# Patient Record
Sex: FEMALE | Race: BLACK OR AFRICAN AMERICAN | NOT HISPANIC OR LATINO | Employment: UNEMPLOYED | URBAN - METROPOLITAN AREA
[De-identification: names, ages, dates, MRNs, and addresses within clinical notes are randomized per-mention and may not be internally consistent; named-entity substitution may affect disease eponyms.]

---

## 2017-05-10 ENCOUNTER — HISTORICAL (OUTPATIENT)
Dept: LAB | Facility: HOSPITAL | Age: 57
End: 2017-05-10

## 2017-05-10 LAB
HAV IGM SERPL QL IA: NEGATIVE
HBV CORE IGM SERPL QL IA: NEGATIVE
HBV SURFACE AG SERPL QL IA: NEGATIVE
HCV AB SERPL QL IA: NEGATIVE
HEPATITIS PANEL INTERP: NORMAL

## 2017-05-23 ENCOUNTER — HISTORICAL (OUTPATIENT)
Dept: RADIOLOGY | Facility: HOSPITAL | Age: 57
End: 2017-05-23

## 2017-08-10 ENCOUNTER — HISTORICAL (OUTPATIENT)
Dept: RADIOLOGY | Facility: HOSPITAL | Age: 57
End: 2017-08-10

## 2017-09-14 ENCOUNTER — HISTORICAL (OUTPATIENT)
Dept: ADMINISTRATIVE | Facility: HOSPITAL | Age: 57
End: 2017-09-14

## 2017-09-14 LAB — HEMOCCULT SP1 STL QL: POSITIVE

## 2017-11-16 ENCOUNTER — HISTORICAL (OUTPATIENT)
Dept: LAB | Facility: HOSPITAL | Age: 57
End: 2017-11-16

## 2017-11-16 LAB
ABS NEUT (OLG): 5.5 X10(3)/MCL (ref 2.1–9.2)
ALBUMIN SERPL-MCNC: 3.3 GM/DL (ref 3.4–5)
ALBUMIN/GLOB SERPL: 0.8 {RATIO}
ALP SERPL-CCNC: 80 UNIT/L (ref 38–126)
ALT SERPL-CCNC: 37 UNIT/L (ref 12–78)
AST SERPL-CCNC: 24 UNIT/L (ref 15–37)
BASOPHILS # BLD AUTO: 0 X10(3)/MCL (ref 0–0.2)
BASOPHILS NFR BLD AUTO: 1 %
BILIRUB SERPL-MCNC: 0.3 MG/DL (ref 0.2–1)
BILIRUBIN DIRECT+TOT PNL SERPL-MCNC: 0.1 MG/DL (ref 0–0.2)
BILIRUBIN DIRECT+TOT PNL SERPL-MCNC: 0.2 MG/DL (ref 0–0.8)
BUN SERPL-MCNC: 14 MG/DL (ref 7–18)
CALCIUM SERPL-MCNC: 9.1 MG/DL (ref 8.5–10.1)
CHLORIDE SERPL-SCNC: 107 MMOL/L (ref 98–107)
CHOLEST SERPL-MCNC: 208 MG/DL (ref 0–200)
CHOLEST/HDLC SERPL: 3.9 {RATIO} (ref 0–4)
CO2 SERPL-SCNC: 27 MMOL/L (ref 21–32)
CREAT SERPL-MCNC: 0.88 MG/DL (ref 0.55–1.02)
DEPRECATED CALCIDIOL+CALCIFEROL SERPL-MC: 27.98 NG/ML (ref 30–80)
EOSINOPHIL # BLD AUTO: 0.4 X10(3)/MCL (ref 0–0.9)
EOSINOPHIL NFR BLD AUTO: 4 %
ERYTHROCYTE [DISTWIDTH] IN BLOOD BY AUTOMATED COUNT: 13 % (ref 11.5–17)
GLOBULIN SER-MCNC: 4 GM/DL (ref 2.4–3.5)
GLUCOSE SERPL-MCNC: 88 MG/DL (ref 74–106)
HCT VFR BLD AUTO: 40.9 % (ref 37–47)
HDLC SERPL-MCNC: 53 MG/DL (ref 35–60)
HGB BLD-MCNC: 12.7 GM/DL (ref 12–16)
LDLC SERPL CALC-MCNC: 136 MG/DL (ref 0–129)
LYMPHOCYTES # BLD AUTO: 1.8 X10(3)/MCL (ref 0.6–4.6)
LYMPHOCYTES NFR BLD AUTO: 21 %
MCH RBC QN AUTO: 29.3 PG (ref 27–31)
MCHC RBC AUTO-ENTMCNC: 31.1 GM/DL (ref 33–36)
MCV RBC AUTO: 94.5 FL (ref 80–94)
MONOCYTES # BLD AUTO: 0.7 X10(3)/MCL (ref 0.1–1.3)
MONOCYTES NFR BLD AUTO: 8 %
NEUTROPHILS # BLD AUTO: 5.5 X10(3)/MCL (ref 1.4–7.9)
NEUTROPHILS NFR BLD AUTO: 65 %
PLATELET # BLD AUTO: 283 X10(3)/MCL (ref 130–400)
PMV BLD AUTO: 10.5 FL (ref 9.4–12.4)
POTASSIUM SERPL-SCNC: 4.1 MMOL/L (ref 3.5–5.1)
PROT SERPL-MCNC: 7.3 GM/DL (ref 6.4–8.2)
RBC # BLD AUTO: 4.33 X10(6)/MCL (ref 4.2–5.4)
SODIUM SERPL-SCNC: 140 MMOL/L (ref 136–145)
T4 FREE SERPL-MCNC: 0.94 NG/DL (ref 0.76–1.46)
TRIGL SERPL-MCNC: 96 MG/DL (ref 30–150)
TSH SERPL-ACNC: 2.57 MIU/ML (ref 0.36–3.74)
VLDLC SERPL CALC-MCNC: 19 MG/DL
WBC # SPEC AUTO: 8.4 X10(3)/MCL (ref 4.5–11.5)

## 2018-11-13 ENCOUNTER — HISTORICAL (OUTPATIENT)
Dept: LAB | Facility: HOSPITAL | Age: 58
End: 2018-11-13

## 2019-02-01 LAB — CRC RECOMMENDATION EXT: NORMAL

## 2019-05-16 ENCOUNTER — HISTORICAL (OUTPATIENT)
Dept: LAB | Facility: HOSPITAL | Age: 59
End: 2019-05-16

## 2019-05-16 LAB
EST. AVERAGE GLUCOSE BLD GHB EST-MCNC: 103 MG/DL
HBA1C MFR BLD: 5.2 % (ref 4.2–6.3)

## 2019-07-29 ENCOUNTER — HISTORICAL (OUTPATIENT)
Dept: CARDIOLOGY | Facility: HOSPITAL | Age: 59
End: 2019-07-29

## 2020-09-06 ENCOUNTER — NURSE TRIAGE (OUTPATIENT)
Dept: ADMINISTRATIVE | Facility: CLINIC | Age: 60
End: 2020-09-06

## 2020-09-06 NOTE — TELEPHONE ENCOUNTER
Pt states she got a text message stating she has an appointment on Wednesday. Calling to confirm appt. No appointment visible. Advised to call MD when office is open to confirm appt.     Reason for Disposition   General information question, no triage required and triager able to answer question    Additional Information   Negative: RN needs further essential information from caller in order to complete triage   Negative: Requesting regular office appointment   Negative: [1] Caller requesting NON-URGENT health information AND [2] PCP's office is the best resource   Negative: Health Information question, no triage required and triager able to answer question    Protocols used: INFORMATION ONLY CALL - NO TRIAGE-A-

## 2020-12-14 ENCOUNTER — HISTORICAL (OUTPATIENT)
Dept: RADIOLOGY | Facility: HOSPITAL | Age: 60
End: 2020-12-14

## 2021-07-01 ENCOUNTER — HISTORICAL (OUTPATIENT)
Dept: PREADMISSION TESTING | Facility: HOSPITAL | Age: 61
End: 2021-07-01

## 2022-03-21 ENCOUNTER — HISTORICAL (OUTPATIENT)
Dept: LAB | Facility: HOSPITAL | Age: 62
End: 2022-03-21

## 2022-03-24 ENCOUNTER — HISTORICAL (OUTPATIENT)
Dept: LAB | Facility: HOSPITAL | Age: 62
End: 2022-03-24

## 2022-03-24 LAB
ABS NEUT (OLG): 4.36 (ref 2.1–9.2)
ALBUMIN SERPL-MCNC: 3.6 G/DL (ref 3.4–4.8)
ALBUMIN/GLOB SERPL: 0.9 {RATIO} (ref 1.1–2)
ALP SERPL-CCNC: 69 U/L (ref 40–150)
ALT SERPL-CCNC: 40 U/L (ref 0–55)
AST SERPL-CCNC: 31 U/L (ref 5–34)
BASOPHILS # BLD AUTO: 0.03 10*3/UL (ref 0–0.2)
BASOPHILS NFR BLD AUTO: 0.4 % (ref 0–1)
BILIRUB SERPL-MCNC: 0.6 MG/DL (ref 0.2–1.2)
BILIRUBIN DIRECT+TOT PNL SERPL-MCNC: 0.3 (ref 0–0.5)
BILIRUBIN DIRECT+TOT PNL SERPL-MCNC: 0.3 (ref 0–0.8)
BUN SERPL-MCNC: 9.5 MG/DL (ref 9.8–20.1)
CALCIUM SERPL-MCNC: 9.1 MG/DL (ref 8.4–10.2)
CHLORIDE SERPL-SCNC: 109 MMOL/L (ref 98–107)
CO2 SERPL-SCNC: 25 MMOL/L (ref 23–31)
CREAT SERPL-MCNC: 0.97 MG/DL (ref 0.57–1.11)
EOSINOPHIL # BLD AUTO: 0.27 10*3/UL (ref 0–0.9)
EOSINOPHIL NFR BLD AUTO: 4 % (ref 0–6.4)
ERYTHROCYTE [DISTWIDTH] IN BLOOD BY AUTOMATED COUNT: 13 % (ref 11.5–17)
EST. AVERAGE GLUCOSE BLD GHB EST-MCNC: 111.2 MG/DL
GLOBULIN SER-MCNC: 4.2 G/DL (ref 2.4–3.5)
GLUCOSE SERPL-MCNC: 95 MG/DL (ref 82–115)
HBA1C MFR BLD: 5.5 %
HCT VFR BLD AUTO: 46 % (ref 37–47)
HEMOLYSIS INTERF INDEX SERPL-ACNC: 15
HGB BLD-MCNC: 14.2 G/DL (ref 12–16)
ICTERIC INTERF INDEX SERPL-ACNC: 1
IMM GRANULOCYTES # BLD AUTO: 0.01 10*3/UL (ref 0–0.02)
IMM GRANULOCYTES NFR BLD AUTO: 0.1 % (ref 0–0.43)
LIPEMIC INTERF INDEX SERPL-ACNC: 3
LYMPHOCYTES # BLD AUTO: 1.53 10*3/UL (ref 0.6–4.6)
LYMPHOCYTES NFR BLD AUTO: 22.6 % (ref 16–44)
MANUAL DIFF? (OHS): NO
MCH RBC QN AUTO: 28.9 PG (ref 27–31)
MCHC RBC AUTO-ENTMCNC: 30.9 G/DL (ref 33–36)
MCV RBC AUTO: 93.7 FL (ref 80–94)
MONOCYTES # BLD AUTO: 0.56 10*3/UL (ref 0.1–1.3)
MONOCYTES NFR BLD AUTO: 8.3 % (ref 4–12.1)
NEUTROPHILS # BLD AUTO: 4.36 10*3/UL (ref 2.1–9.2)
NEUTROPHILS NFR BLD AUTO: 64.6 % (ref 43–73)
NRBC BLD AUTO-RTO: 0 % (ref 0–0.2)
PLATELET # BLD AUTO: 142 10*3/UL (ref 130–400)
PMV BLD AUTO: 11 FL (ref 7.4–10.4)
POTASSIUM SERPL-SCNC: 4 MMOL/L (ref 3.5–5.1)
PROT SERPL-MCNC: 7.8 G/DL (ref 5.8–7.6)
RBC # BLD AUTO: 4.91 10*6/UL (ref 4.2–5.4)
SODIUM SERPL-SCNC: 142 MMOL/L (ref 136–145)
T3FREE SERPL-MCNC: 2.43 PG/ML (ref 1.58–3.91)
T4 FREE SERPL-MCNC: 0.8 NG/DL (ref 0.7–1.48)
TSH SERPL-ACNC: 2.72 M[IU]/L (ref 0.35–4.94)
WBC # SPEC AUTO: 6.8 10*3/UL (ref 4.5–11.5)

## 2022-04-11 ENCOUNTER — HISTORICAL (OUTPATIENT)
Dept: ADMINISTRATIVE | Facility: HOSPITAL | Age: 62
End: 2022-04-11
Payer: MEDICARE

## 2022-04-27 VITALS
BODY MASS INDEX: 43.96 KG/M2 | WEIGHT: 273.56 LBS | SYSTOLIC BLOOD PRESSURE: 161 MMHG | HEIGHT: 66 IN | OXYGEN SATURATION: 96 % | DIASTOLIC BLOOD PRESSURE: 87 MMHG

## 2022-05-03 ENCOUNTER — TELEPHONE (OUTPATIENT)
Dept: NEUROLOGY | Facility: CLINIC | Age: 62
End: 2022-05-03
Payer: MEDICARE

## 2022-05-03 RX ORDER — LEVETIRACETAM 500 MG/1
500 TABLET ORAL 2 TIMES DAILY
Qty: 60 TABLET | Refills: 11 | Status: SHIPPED | OUTPATIENT
Start: 2022-05-03 | End: 2022-07-05

## 2022-05-03 NOTE — TELEPHONE ENCOUNTER
Patient's  (Dave) called reporting since medication change on Vimpat to 150 mg in qAm and 200 mg qPM on 04/05/22 per last office note. States patient has been nausea, has no appetite, diarrhea, and has stomach cramping. Also, states patient had a seizure this morning, reports he witness the seizure and it lasted for 15 minutes. Denies any aspirating or biting of the tongue. Denies any falls or injuries. Denies any loss of bladder or bowel movement. Denies any loss of consciousness. States patient hasn't been sleeping well and might get 4-5 hours of sleep at night. Patient does wake out of sleep throughout night. Denies any UTI symptoms. Requesting a call back to further discuss medication and what do next due to medication change is making patient sick. Please advise.

## 2022-05-03 NOTE — TELEPHONE ENCOUNTER
Pt states would be okay with adjusting medication and weaning off vimpat would like [facility] [place] [facility]

## 2022-05-04 ENCOUNTER — TELEPHONE (OUTPATIENT)
Dept: NEUROLOGY | Facility: CLINIC | Age: 62
End: 2022-05-04
Payer: MEDICARE

## 2022-05-25 RX ORDER — LACOSAMIDE 150 MG/1
150 TABLET ORAL 2 TIMES DAILY
COMMUNITY
Start: 2021-11-01 | End: 2022-05-25 | Stop reason: SDUPTHER

## 2022-05-25 RX ORDER — LACOSAMIDE 150 MG/1
150 TABLET ORAL 2 TIMES DAILY
Qty: 60 TABLET | Refills: 3 | Status: SHIPPED | OUTPATIENT
Start: 2022-05-25 | End: 2022-06-08 | Stop reason: SDUPTHER

## 2022-06-08 DIAGNOSIS — G40.209 PARTIAL SYMPTOMATIC EPILEPSY WITH COMPLEX PARTIAL SEIZURES, NOT INTRACTABLE, WITHOUT STATUS EPILEPTICUS: Primary | ICD-10-CM

## 2022-06-08 RX ORDER — CLONAZEPAM 1 MG/1
0.5 TABLET ORAL NIGHTLY
COMMUNITY
Start: 2021-08-13 | End: 2022-06-08 | Stop reason: SDUPTHER

## 2022-06-09 RX ORDER — LACOSAMIDE 150 MG/1
150 TABLET ORAL 2 TIMES DAILY
Qty: 60 TABLET | Refills: 3 | Status: SHIPPED | OUTPATIENT
Start: 2022-06-09 | End: 2022-09-22 | Stop reason: SDUPTHER

## 2022-06-09 RX ORDER — CLONAZEPAM 1 MG/1
0.5 TABLET ORAL NIGHTLY
Qty: 15 TABLET | Refills: 3 | Status: SHIPPED | OUTPATIENT
Start: 2022-06-09 | End: 2022-10-07 | Stop reason: SDUPTHER

## 2022-06-29 ENCOUNTER — TELEPHONE (OUTPATIENT)
Dept: FAMILY MEDICINE | Facility: CLINIC | Age: 62
End: 2022-06-29
Payer: MEDICARE

## 2022-06-29 DIAGNOSIS — J44.9 CHRONIC OBSTRUCTIVE PULMONARY DISEASE, UNSPECIFIED COPD TYPE: Primary | ICD-10-CM

## 2022-06-29 NOTE — TELEPHONE ENCOUNTER
----- Message from Mani Clinton sent at 6/29/2022  2:50 PM CDT -----  .Type:  Needs Medical Advice    Who Called: Won [facility]  Symptoms (please be specific):    How long has patient had these symptoms:    Pharmacy name and phone #:    Would the patient rather a call back or a response via [facility]?   Best Call Back Number:  [phone]  Additional Information: She needs a referral to a pulmonologist,  she would like to see Dr. Won Gallego, # [phone]

## 2022-06-29 NOTE — TELEPHONE ENCOUNTER
I have signed for the following orders.  Please call the patient to inform about referrals that were sent.      Orders Placed This Encounter   Procedures    Ambulatory referral/consult to Pulmonology     Standing Status:   Future     Standing Expiration Date:   7/29/2023     Referral Priority:   Routine     Referral Type:   Consultation     Referral Reason:   Specialty Services Required     Referred to Provider:   Won Gallego MD     Requested Specialty:   Pulmonary Disease     Number of Visits Requested:   1

## 2022-07-05 ENCOUNTER — OFFICE VISIT (OUTPATIENT)
Dept: FAMILY MEDICINE | Facility: CLINIC | Age: 62
End: 2022-07-05
Payer: MEDICARE

## 2022-07-05 ENCOUNTER — LAB VISIT (OUTPATIENT)
Dept: LAB | Facility: HOSPITAL | Age: 62
End: 2022-07-05
Attending: FAMILY MEDICINE
Payer: MEDICARE

## 2022-07-05 VITALS
SYSTOLIC BLOOD PRESSURE: 138 MMHG | DIASTOLIC BLOOD PRESSURE: 82 MMHG | TEMPERATURE: 98 F | RESPIRATION RATE: 18 BRPM | BODY MASS INDEX: 45.54 KG/M2 | HEART RATE: 87 BPM | OXYGEN SATURATION: 99 % | WEIGHT: 280 LBS

## 2022-07-05 DIAGNOSIS — F41.9 ANXIETY: ICD-10-CM

## 2022-07-05 DIAGNOSIS — F32.A DEPRESSIVE DISORDER: ICD-10-CM

## 2022-07-05 DIAGNOSIS — G47.33 OBSTRUCTIVE SLEEP APNEA SYNDROME: ICD-10-CM

## 2022-07-05 DIAGNOSIS — E66.01 CLASS 3 SEVERE OBESITY WITH SERIOUS COMORBIDITY AND BODY MASS INDEX (BMI) OF 45.0 TO 49.9 IN ADULT, UNSPECIFIED OBESITY TYPE: ICD-10-CM

## 2022-07-05 DIAGNOSIS — G40.909 NONINTRACTABLE EPILEPSY WITHOUT STATUS EPILEPTICUS, UNSPECIFIED EPILEPSY TYPE: Primary | ICD-10-CM

## 2022-07-05 DIAGNOSIS — G62.9 NEUROPATHY: ICD-10-CM

## 2022-07-05 DIAGNOSIS — J44.9 CHRONIC OBSTRUCTIVE PULMONARY DISEASE, UNSPECIFIED COPD TYPE: ICD-10-CM

## 2022-07-05 DIAGNOSIS — G40.909 NONINTRACTABLE EPILEPSY WITHOUT STATUS EPILEPTICUS, UNSPECIFIED EPILEPSY TYPE: ICD-10-CM

## 2022-07-05 PROBLEM — K21.9 GASTROESOPHAGEAL REFLUX DISEASE: Status: ACTIVE | Noted: 2022-07-05

## 2022-07-05 PROBLEM — J43.9 PULMONARY EMPHYSEMA: Status: ACTIVE | Noted: 2022-07-05

## 2022-07-05 PROBLEM — I10 HYPERTENSION: Status: ACTIVE | Noted: 2022-07-05

## 2022-07-05 PROBLEM — Z12.31 BREAST CANCER SCREENING BY MAMMOGRAM: Status: ACTIVE | Noted: 2022-07-05

## 2022-07-05 PROBLEM — N32.81 OVERACTIVE BLADDER: Status: ACTIVE | Noted: 2022-07-05

## 2022-07-05 PROBLEM — C18.9 COLON CANCER: Status: ACTIVE | Noted: 2018-01-18

## 2022-07-05 PROBLEM — E66.813 CLASS 3 SEVERE OBESITY WITH SERIOUS COMORBIDITY AND BODY MASS INDEX (BMI) OF 45.0 TO 49.9 IN ADULT: Status: ACTIVE | Noted: 2022-07-05

## 2022-07-05 PROBLEM — H40.9 GLAUCOMA: Status: ACTIVE | Noted: 2022-07-05

## 2022-07-05 PROBLEM — C18.5: Status: ACTIVE | Noted: 2017-12-20

## 2022-07-05 PROBLEM — Z00.00 MEDICARE ANNUAL WELLNESS VISIT, SUBSEQUENT: Status: ACTIVE | Noted: 2022-07-05

## 2022-07-05 PROBLEM — Z83.3 FAMILY HISTORY OF DIABETES MELLITUS: Status: ACTIVE | Noted: 2022-07-05

## 2022-07-05 PROBLEM — J43.9 PULMONARY EMPHYSEMA: Status: RESOLVED | Noted: 2022-07-05 | Resolved: 2022-07-05

## 2022-07-05 PROBLEM — D24.9 FIBROADENOMA OF BREAST: Status: ACTIVE | Noted: 2022-07-05

## 2022-07-05 LAB
APPEARANCE UR: ABNORMAL
BILIRUB UR QL STRIP.AUTO: NEGATIVE MG/DL
COLOR UR AUTO: YELLOW
GLUCOSE UR QL STRIP.AUTO: NEGATIVE MG/DL
KETONES UR QL STRIP.AUTO: NEGATIVE MG/DL
LEUKOCYTE ESTERASE UR QL STRIP.AUTO: NEGATIVE UNIT/L
NITRITE UR QL STRIP.AUTO: NEGATIVE
PH UR STRIP.AUTO: 7.5 [PH]
PROT UR QL STRIP.AUTO: NEGATIVE MG/DL
RBC UR QL AUTO: NEGATIVE UNIT/L
SP GR UR STRIP.AUTO: 1.02
UROBILINOGEN UR STRIP-ACNC: 1 MG/DL

## 2022-07-05 PROCEDURE — 99214 PR OFFICE/OUTPT VISIT, EST, LEVL IV, 30-39 MIN: ICD-10-PCS | Mod: ,,, | Performed by: FAMILY MEDICINE

## 2022-07-05 PROCEDURE — 99214 OFFICE O/P EST MOD 30 MIN: CPT | Mod: ,,, | Performed by: FAMILY MEDICINE

## 2022-07-05 PROCEDURE — 3008F PR BODY MASS INDEX (BMI) DOCUMENTED: ICD-10-PCS | Mod: CPTII,,, | Performed by: FAMILY MEDICINE

## 2022-07-05 PROCEDURE — 1159F PR MEDICATION LIST DOCUMENTED IN MEDICAL RECORD: ICD-10-PCS | Mod: CPTII,,, | Performed by: FAMILY MEDICINE

## 2022-07-05 PROCEDURE — 1159F MED LIST DOCD IN RCRD: CPT | Mod: CPTII,,, | Performed by: FAMILY MEDICINE

## 2022-07-05 PROCEDURE — 3079F DIAST BP 80-89 MM HG: CPT | Mod: CPTII,,, | Performed by: FAMILY MEDICINE

## 2022-07-05 PROCEDURE — 3075F SYST BP GE 130 - 139MM HG: CPT | Mod: CPTII,,, | Performed by: FAMILY MEDICINE

## 2022-07-05 PROCEDURE — 3008F BODY MASS INDEX DOCD: CPT | Mod: CPTII,,, | Performed by: FAMILY MEDICINE

## 2022-07-05 PROCEDURE — 4010F ACE/ARB THERAPY RXD/TAKEN: CPT | Mod: CPTII,,, | Performed by: FAMILY MEDICINE

## 2022-07-05 PROCEDURE — 3079F PR MOST RECENT DIASTOLIC BLOOD PRESSURE 80-89 MM HG: ICD-10-PCS | Mod: CPTII,,, | Performed by: FAMILY MEDICINE

## 2022-07-05 PROCEDURE — 3075F PR MOST RECENT SYSTOLIC BLOOD PRESS GE 130-139MM HG: ICD-10-PCS | Mod: CPTII,,, | Performed by: FAMILY MEDICINE

## 2022-07-05 PROCEDURE — 81003 URINALYSIS AUTO W/O SCOPE: CPT

## 2022-07-05 PROCEDURE — 4010F PR ACE/ARB THEARPY RXD/TAKEN: ICD-10-PCS | Mod: CPTII,,, | Performed by: FAMILY MEDICINE

## 2022-07-05 RX ORDER — GARLIC 1000 MG
CAPSULE ORAL
COMMUNITY
End: 2023-10-25

## 2022-07-05 RX ORDER — HYDROXYZINE PAMOATE 25 MG/1
CAPSULE ORAL
COMMUNITY
Start: 2021-08-24 | End: 2022-08-24

## 2022-07-05 RX ORDER — GABAPENTIN 300 MG/1
CAPSULE ORAL
COMMUNITY
Start: 2022-02-07 | End: 2022-07-05 | Stop reason: SDUPTHER

## 2022-07-05 RX ORDER — GABAPENTIN 100 MG/1
200 CAPSULE ORAL NIGHTLY
Qty: 180 CAPSULE | Refills: 3 | Status: SHIPPED | OUTPATIENT
Start: 2022-07-05 | End: 2022-07-06 | Stop reason: SDUPTHER

## 2022-07-05 RX ORDER — LISINOPRIL 40 MG/1
TABLET ORAL
COMMUNITY
Start: 2021-11-15 | End: 2023-06-05 | Stop reason: SDUPTHER

## 2022-07-05 RX ORDER — CLORAZEPATE DIPOTASSIUM 7.5 MG/1
7.5 TABLET ORAL 2 TIMES DAILY
COMMUNITY
End: 2023-06-05

## 2022-07-05 RX ORDER — LATANOPROST 50 UG/ML
1 SOLUTION/ DROPS OPHTHALMIC NIGHTLY
COMMUNITY
Start: 2022-05-05

## 2022-07-05 NOTE — PROGRESS NOTES
Subjective:        Patient ID: Christie Marcum is a 61 y.o. female.    Chief Complaint: Follow-up (Patient reports leg pain bilaterally, more so in the right leg. Patient states pain has started about a month ago, denies any kind of injury. Has taken OTC ibuprofen with no relief)      Very pleasant patient presents to clinic with her friend, Dave, with complaints. she is due for her wellness visit in September    She reports bilateral thigh pain R>L x 1.5 months.  No trauma or injury.  Reports she fell while having a seizure in 2012.  She describes the pain as dull and throbbing. Not constant. Not daily. Feels weak. Does not go into calves or feet. No swelling. No increased warmth. No color change.  Hurts sitting or standing. Has done advil and biofreeze/massage and helped some.     She has a history of epilepsy. She is followed by neurology, Dr. Worley.  on buspar 5mg bid, on vimpat, hydroxyzine, topiramate, klonopin qhs, and gabapentin 300mg qam. She reports she has had 19 seizures since 62022. states increased effexor dose has helped seizure activity. feels like loud noises set off seizures. has follow up 7/14/2022. No change in meds.  Wants to try to wean off gabapentin    She has a history of KHANH on CPAP, COPD and emphysema and is prescribed oxygen therapy 24/7. she has been off of her O2 x >9 months. Her pulmonologist is Dr. Terry. She was discharged from his office 12/2021. She brought her discharge letter to clinic today.  she does not have her O2 today because insurance/pulmonology office is giving her trouble. she is not using her cpap either due to same issues. still having issues with getting her portable O2. she is using [employer] in [place]. she is not wearing o2 today. Would like to be referred to another pulmonologist        has anxiety/ depression and is on effexor      tara khan at [facility] 7/26/2021 with dr. lorena cortes. gave norco and pantoprazole and made her seizures worse.  once stopped meds, seizures stopped. she has followed up post op with him. has appt with dr. yepez (GI) for follow up.    on gabapentin 300mg qpm for peripheral neuropathy. Would like to try decreasing dose.     She has a history of cancer of the splenic flexure and surgery and reanastomosis. She is followed by Dr. Gillespie. saw them last 4/2021. took mediport out 6/2021. Previously saw dr. doyle. he no longer takes her insurance. is now seeing dr. dheeraj yepez in [place]. only seeing prn. has appt in 2 years for colonoscopy    She has a has a history of overactive bladder and urinary incontinence. Her urologist is Dr. Flor. has seen him in 2020. not currently on meds for her bladder. not currently seeing him. The patient had hallucinations with oxybutynin. She tolerated Toviaz however she is having trouble with her insurance paying for this. They also discussed possible Botox injections.    Has a history of hypertension and is compliant with her medication. she is on lisinopril qhs. refuses asa    has glaucoma. on drops. dr. perry at [facility] office. sees them q6 months. next appt 8/2022. However she states she will now be seeing [facility].    Last pap smear 5/2019 and was normal and HPV negative. She reports a history of partial hysterectomy. She still has her ovaries. She has had abnormal mammogram on 5/7/19 with subsequent breast biopsy done at [facility] of bilateral breasts which showed fibroadenoma and stromal fibrosis. mmg 7/17/2020 at [facility] was normal. at her visit on 8/24/21 she reported felt lumps in right breast. states did mmg at [facility] 9/2021 which was normal. copy in chart. recommended annual screening.    She is ALLERGIC to Keflex . She is also ALLERGIC to Aleve, codeine, naproxen, tetracycline and Ultram . she reports a positive family history of diabetes in older sister.  got covid calin and calin 3/8/21. got booster. got 2021 flu shot.    Review of Systems    Constitutional: Negative.    HENT: Negative.    Respiratory: Positive for shortness of breath.    Cardiovascular: Negative.    Gastrointestinal: Negative.    Genitourinary: Negative.    Musculoskeletal: Positive for myalgias.   Neurological: Positive for seizures.         Review of patient's allergies indicates:   Allergen Reactions    Cephalexin      Other reaction(s): Seizure  hallucinations      Ciprofloxacin      Other reaction(s): Seiurus    Codeine      Other reaction(s): Seizure  seizure      Metronidazole      Other reaction(s): Seizure    Naproxen      Other reaction(s): Seizure  Nausea/seizure      Tetracycline      Other reaction(s): Seizure  seizure      Tramadol Other (See Comments)     Other reaction(s): Seizure  SEIZURES HALLUCINATION        Vitals:    07/05/22 1327   BP: 138/82   BP Location: Left arm   Pulse: 87   Resp: 18   Temp: 98.3 °F (36.8 °C)   SpO2: 99%   Weight: 127 kg (280 lb)      Social History     Socioeconomic History    Marital status:    Tobacco Use    Smoking status: Never Smoker    Smokeless tobacco: Never Used   Substance and Sexual Activity    Alcohol use: Not Currently      History reviewed. No pertinent family history.       Objective:     Physical Exam  Vitals and nursing note reviewed.   Constitutional:       Appearance: Normal appearance. She is obese.   HENT:      Head: Normocephalic and atraumatic.      Nose: Nose normal.      Mouth/Throat:      Mouth: Mucous membranes are moist.      Pharynx: Oropharynx is clear.   Eyes:      Extraocular Movements: Extraocular movements intact.   Cardiovascular:      Rate and Rhythm: Normal rate and regular rhythm.      Pulses: Normal pulses.      Heart sounds: Normal heart sounds.   Pulmonary:      Effort: Pulmonary effort is normal.      Breath sounds: Normal breath sounds.   Musculoskeletal:         General: Normal range of motion.      Cervical back: Normal range of motion.      Right upper leg: Normal.      Left  upper leg: Normal.      Right lower leg: Normal.      Left lower leg: Normal.      Comments: Ambulates unassisted. 5/5 strength bilateral LE.    Skin:     General: Skin is warm and dry.   Neurological:      General: No focal deficit present.      Mental Status: She is alert and oriented to person, place, and time. Mental status is at baseline.   Psychiatric:         Mood and Affect: Mood normal.       Current Outpatient Medications on File Prior to Visit   Medication Sig Dispense Refill    hydrOXYzine pamoate (VISTARIL) 25 MG Cap   See Instructions, TAKE TWO CAPSULES BY MOUTH TWICE A DAY, # 120 cap(s), 5 Refill(s), Pharmacy: [id number], 167, cm, Height/Length Dosing, 08/24/21 9:07:00 CDT, 122.6, kg, Weight Dosing, 08/24/21 9:07:00 CDT      lisinopriL (PRINIVIL,ZESTRIL) 40 MG tablet   See Instructions, TAKE ONE TABLET BY MOUTH EVERY DAY, # 90 tab(s), 3 Refill(s), Pharmacy: [id number], 167, cm, Height/Length Dosing, 03/21/22 10:39:00 CDT, 123.45, kg, Weight Dosing, 03/21/22 10:39:00 CDT      [DISCONTINUED] gabapentin (NEURONTIN) 300 MG capsule   See Instructions, TAKE 1 CAPSULE BY MOUTH TWICE A DAY MAY CAUSE DROWSINESS, # 60 cap(s), 5 Refill(s), Pharmacy: [id number], 167, cm, Height/Length Dosing, 11/22/21 13:10:00 CST, 124.1, kg, Weight Dosing, 11/22/21 13:10:00 CST      clonazePAM (KLONOPIN) 1 MG tablet Take 0.5 tablets (0.5 mg total) by mouth nightly. 15 tablet 3    clorazepate (TRANXENE) 7.5 MG Tab Take 7.5 mg by mouth.      garlic 1,000 mg Cap Take by mouth.      lacosamide (VIMPAT) 150 mg Tab tablet Take 1 tablet (150 mg total) by mouth 2 (two) times a day. 1 tab(s) (150 mg), BID 60 tablet 3    latanoprost 0.005 % ophthalmic solution Place 1 drop into both eyes nightly.      topiramate (TOPAMAX) 100 MG tablet TAKE 1 TABLET BY MOUTH TWICE A  tablet 1    venlafaxine (EFFEXOR-XR) 75 MG 24 hr capsule TAKE 1 CAPSULE BY MOUTH EVERY DAY 90 capsule 2    [DISCONTINUED] levETIRAcetam  (KEPPRA) 500 MG Tab Take 1 tablet (500 mg total) by mouth 2 (two) times daily. 60 tablet 11     No current facility-administered medications on file prior to visit.     Health Maintenance   Topic Date Due    Mammogram  09/20/2022    Lipid Panel  11/16/2022    TETANUS VACCINE  09/09/2030    Hepatitis C Screening  Completed      Results for orders placed or performed in visit on 03/24/22   CBC Auto Differential   Result Value Ref Range    WBC 6.8 4.5 - 11.5    RBC 4.91 4.20 - 5.40    Hgb 14.2 12.0 - 16.0    Hct 46.0 37.0 - 47.0    MCV 93.7 80.0 - 94.0    MCH 28.9 27.0 - 31.0    MCHC 30.9 33.0 - 36.0    RDW 13.0 11.5 - 17.0    Platelet 142 130 - 400    MPV 11.0 7.4 - 10.4    Abs Neut 4.36 2.10 - 9.20    Manual Diff? No    Differential   Result Value Ref Range    Neut % 64.6 43.0 - 73.0    Lymph % 22.6 16.0 - 44.0    Mono % 8.3 4.0 - 12.1    Eos % 4.0 0.0 - 6.4    Basophil % 0.4 0.0 - 1.0    Lymph # 1.53 0.60 - 4.60    Neut # 4.36 2.10 - 9.20    Mono # 0.56 0.10 - 1.30    Eos # 0.27 0.00 - 0.90    Baso # 0.03 0.00 - 0.20    IG# 0.0100 0.0000 - 0.0155    IG% 0.100 0.000 - 0.430    NRBC% 0.0 0.0 - 0.2   Hemoglobin A1C   Result Value Ref Range    Hemoglobin A1c 5.5 <=7.0    Estimated Average Glucose 111.2    Comprehensive Metabolic Panel   Result Value Ref Range    Sodium Level 142 136 - 145    Potassium Level 4.0 3.5 - 5.1    Chloride 109 98 - 107    Carbon Dioxide 25 23 - 31    Glucose Level 95 82 - 115    Blood Urea Nitrogen 9.5 9.8 - 20.1    Creatinine 0.97 0.57 - 1.11    Calcium Level Total 9.1 8.4 - 10.2    Albumin Level 3.6 3.4 - 4.8    Protein Total 7.8 5.8 - 7.6    Globulin 4.2 2.4 - 3.5    Albumin/Globulin Ratio 0.9 1.1 - 2.0    Alkaline Phosphatase 69 40 - 150    Bilirubin Total 0.6 0.2 - 1.2    Bilirubin Direct 0.3 0.0 - 0.5    Bilirubin Indirect 0.30 0.00 - 0.80    Aspartate Aminotransferase 31 5 - 34    Alanine Aminotransferase 40 0 - 55    Hemolysis 15     Icterus 1     Lipemia 3    GFR, Estimated    Result Value Ref Range    Estimated GFR- >60     Estimated GFR-Non African American >60    TSH   Result Value Ref Range    Thyroid Stimulating Hormone 2.7235 0.3500 - 4.9400   T3, Free   Result Value Ref Range    T3 Free 2.43 1.58 - 3.91   T4, Free   Result Value Ref Range    Thyroxine Free 0.80 0.70 - 1.48          Assessment & Plan:     Active Problem List with Overview Notes    Diagnosis Date Noted    Overactive bladder 07/05/2022    Family history of diabetes mellitus 07/05/2022    Breast cancer screening by mammogram 07/05/2022    Anxiety 07/05/2022    Medicare annual wellness visit, subsequent 07/05/2022    Depressive disorder 07/05/2022    Epilepsy 07/05/2022    Fibroadenoma of breast 07/05/2022    Gastroesophageal reflux disease 07/05/2022    Glaucoma 07/05/2022    Hypertension 07/05/2022    Obstructive sleep apnea syndrome 07/05/2022    Class 3 severe obesity with serious comorbidity and body mass index (BMI) of 45.0 to 49.9 in adult 07/05/2022    COPD (chronic obstructive pulmonary disease) 07/05/2022    Neuropathy 07/05/2022    Colon cancer 01/18/2018    Cancer of splenic flexure 12/20/2017       1. Nonintractable epilepsy without status epilepticus, unspecified epilepsy type  Assessment & Plan:  Keep appointments with neuro.     Orders:  -     CBC Auto Differential  -     Comprehensive Metabolic Panel  -     Magnesium  -     Urinalysis, Reflex to Urine Culture Urine, Clean Catch    2. Class 3 severe obesity with serious comorbidity and body mass index (BMI) of 45.0 to 49.9 in adult, unspecified obesity type  Assessment & Plan:  Encourage lifestyle change    Orders:  -     CBC Auto Differential  -     Comprehensive Metabolic Panel  -     Magnesium  -     Urinalysis, Reflex to Urine Culture Urine, Clean Catch    3. Anxiety  Assessment & Plan:  Stable on current meds    Orders:  -     CBC Auto Differential  -     Comprehensive Metabolic Panel  -     Magnesium  -      Urinalysis, Reflex to Urine Culture Urine, Clean Catch    4. Depressive disorder  Assessment & Plan:  Stable on current meds    Orders:  -     CBC Auto Differential  -     Comprehensive Metabolic Panel  -     Magnesium  -     Urinalysis, Reflex to Urine Culture Urine, Clean Catch    5. Chronic obstructive pulmonary disease, unspecified COPD type  Assessment & Plan:  Pulmonology referral placed.     Orders:  -     Ambulatory referral/consult to Pulmonology  -     CBC Auto Differential  -     Comprehensive Metabolic Panel  -     Magnesium  -     Urinalysis, Reflex to Urine Culture Urine, Clean Catch    6. Obstructive sleep apnea syndrome  Assessment & Plan:  Pulmonology referral placed.     Orders:  -     Ambulatory referral/consult to Pulmonology  -     CBC Auto Differential  -     Comprehensive Metabolic Panel  -     Magnesium  -     Urinalysis, Reflex to Urine Culture Urine, Clean Catch    7. Neuropathy  Assessment & Plan:  Currently on gabapentin 300mg daily.  Will decrease to 200mg daily.  New rx sent in.  Continue to wean off as tolerated.       Other orders  -     gabapentin (NEURONTIN) 100 MG capsule       Follow up in about 2 months (around 9/5/2022) for wellness with labs.     Will get labs for leg pain. Will call with results when available. Continue with nsaids and massage and muscle rubs. Encourage adequate hydration. Will avoid muscle relaxer due to concern for lowering seizure threshold.

## 2022-07-05 NOTE — ASSESSMENT & PLAN NOTE
Currently on gabapentin 300mg daily.  Will decrease to 200mg daily.  New rx sent in.  Continue to wean off as tolerated.

## 2022-07-06 ENCOUNTER — TELEPHONE (OUTPATIENT)
Dept: ADMINISTRATIVE | Facility: HOSPITAL | Age: 62
End: 2022-07-06
Payer: MEDICARE

## 2022-07-06 RX ORDER — GABAPENTIN 100 MG/1
200 CAPSULE ORAL 2 TIMES DAILY
Qty: 360 CAPSULE | Refills: 3 | Status: SHIPPED | OUTPATIENT
Start: 2022-07-06 | End: 2022-07-11 | Stop reason: SDUPTHER

## 2022-07-07 ENCOUNTER — TELEPHONE (OUTPATIENT)
Dept: FAMILY MEDICINE | Facility: CLINIC | Age: 62
End: 2022-07-07
Payer: MEDICARE

## 2022-07-07 NOTE — TELEPHONE ENCOUNTER
----- Message from Sunshine Lucero sent at 7/7/2022 11:39 AM CDT -----  Regarding: call back  Type:  Patient Returning Call    Who Called: pt  Who Left Message for Patient: Gaby   Does the patient know what this is regarding?: ys  Would the patient rather a call back or a response via [facility]?    Best Call Back Number: [phone]  Additional Information: requesting call back from Gaby

## 2022-07-07 NOTE — TELEPHONE ENCOUNTER
Patient notified of what Dr. Leon advised in result note. She is actually only taking gabapentin 300mg daily (in am). Please Advise. Corrina

## 2022-07-07 NOTE — TELEPHONE ENCOUNTER
Please please confirm correct dose with patient.  This is the second time we are having to adjust meds and I want to be sure before I adjust again

## 2022-07-11 ENCOUNTER — TELEPHONE (OUTPATIENT)
Dept: FAMILY MEDICINE | Facility: CLINIC | Age: 62
End: 2022-07-11
Payer: MEDICARE

## 2022-07-11 RX ORDER — GABAPENTIN 100 MG/1
200 CAPSULE ORAL DAILY
Qty: 180 CAPSULE | Refills: 3
Start: 2022-07-11 | End: 2022-11-02

## 2022-07-11 NOTE — TELEPHONE ENCOUNTER
----- Message from Shanelle Berry sent at 7/11/2022 11:50 AM CDT -----  Regarding: rx  Type:  Needs Medical Advice    Who Called: patient  Symptoms (please be specific):   How long has patient had these symptoms:   Pharmacy name and phone #:  [facility] on [address]  Would the patient rather a call back or a response via [facility]?   Best Call Back Number: [phone]  Additional Information: Gabpentin med should be 300 mg once a day

## 2022-07-11 NOTE — TELEPHONE ENCOUNTER
Noted.  Since she was taking 300mg once daily. There should be a prescription at pharmacy for 100mg.  She will take two 100mg (200mg) tabs once daily for at least 1 month.  Then she can decrease to 100mg once daily for at least 1 month before she attempts to stop. Contact clinic for concerns.   I have signed for the following orders AND/OR meds.  Please call the patient and ask the patient to schedule the testing AND/OR inform about any medications that were sent.        Medications Ordered This Encounter   Medications    gabapentin (NEURONTIN) 100 MG capsule     Sig: Take 2 capsules (200 mg total) by mouth once daily.     Dispense:  180 capsule     Refill:  3

## 2022-07-12 NOTE — TELEPHONE ENCOUNTER
Pt has been advised to take two 100mg tabs for one month and take 100mg tab for one month w/ verbalized understanding. Pt stated that facility wanted more information and was able to speak w/ someone at the office regarding this information, so she should be able to schedule soon.

## 2022-07-13 ENCOUNTER — TELEPHONE (OUTPATIENT)
Dept: FAMILY MEDICINE | Facility: CLINIC | Age: 62
End: 2022-07-13
Payer: MEDICARE

## 2022-07-14 ENCOUNTER — OFFICE VISIT (OUTPATIENT)
Dept: NEUROLOGY | Facility: CLINIC | Age: 62
End: 2022-07-14
Payer: MEDICARE

## 2022-07-14 ENCOUNTER — DOCUMENTATION ONLY (OUTPATIENT)
Dept: FAMILY MEDICINE | Facility: CLINIC | Age: 62
End: 2022-07-14
Payer: MEDICARE

## 2022-07-14 VITALS
BODY MASS INDEX: 45.06 KG/M2 | DIASTOLIC BLOOD PRESSURE: 90 MMHG | HEART RATE: 80 BPM | WEIGHT: 280.38 LBS | HEIGHT: 66 IN | SYSTOLIC BLOOD PRESSURE: 140 MMHG

## 2022-07-14 DIAGNOSIS — G40.909 NONINTRACTABLE EPILEPSY WITHOUT STATUS EPILEPTICUS, UNSPECIFIED EPILEPSY TYPE: ICD-10-CM

## 2022-07-14 DIAGNOSIS — M54.50 LOW BACK PAIN WITHOUT SCIATICA, UNSPECIFIED BACK PAIN LATERALITY, UNSPECIFIED CHRONICITY: Primary | ICD-10-CM

## 2022-07-14 PROCEDURE — 1160F PR REVIEW ALL MEDS BY PRESCRIBER/CLIN PHARMACIST DOCUMENTED: ICD-10-PCS | Mod: CPTII,S$GLB,, | Performed by: PSYCHIATRY & NEUROLOGY

## 2022-07-14 PROCEDURE — 1160F RVW MEDS BY RX/DR IN RCRD: CPT | Mod: CPTII,S$GLB,, | Performed by: PSYCHIATRY & NEUROLOGY

## 2022-07-14 PROCEDURE — 4010F PR ACE/ARB THEARPY RXD/TAKEN: ICD-10-PCS | Mod: CPTII,S$GLB,, | Performed by: PSYCHIATRY & NEUROLOGY

## 2022-07-14 PROCEDURE — 4010F ACE/ARB THERAPY RXD/TAKEN: CPT | Mod: CPTII,S$GLB,, | Performed by: PSYCHIATRY & NEUROLOGY

## 2022-07-14 PROCEDURE — 99999 PR PBB SHADOW E&M-EST. PATIENT-LVL IV: CPT | Mod: PBBFAC,,, | Performed by: PSYCHIATRY & NEUROLOGY

## 2022-07-14 PROCEDURE — 99999 PR PBB SHADOW E&M-EST. PATIENT-LVL IV: ICD-10-PCS | Mod: PBBFAC,,, | Performed by: PSYCHIATRY & NEUROLOGY

## 2022-07-14 PROCEDURE — 3077F PR MOST RECENT SYSTOLIC BLOOD PRESSURE >= 140 MM HG: ICD-10-PCS | Mod: CPTII,S$GLB,, | Performed by: PSYCHIATRY & NEUROLOGY

## 2022-07-14 PROCEDURE — 3080F PR MOST RECENT DIASTOLIC BLOOD PRESSURE >= 90 MM HG: ICD-10-PCS | Mod: CPTII,S$GLB,, | Performed by: PSYCHIATRY & NEUROLOGY

## 2022-07-14 PROCEDURE — 99213 OFFICE O/P EST LOW 20 MIN: CPT | Mod: S$GLB,,, | Performed by: PSYCHIATRY & NEUROLOGY

## 2022-07-14 PROCEDURE — 1159F PR MEDICATION LIST DOCUMENTED IN MEDICAL RECORD: ICD-10-PCS | Mod: CPTII,S$GLB,, | Performed by: PSYCHIATRY & NEUROLOGY

## 2022-07-14 PROCEDURE — 99213 PR OFFICE/OUTPT VISIT, EST, LEVL III, 20-29 MIN: ICD-10-PCS | Mod: S$GLB,,, | Performed by: PSYCHIATRY & NEUROLOGY

## 2022-07-14 PROCEDURE — 3080F DIAST BP >= 90 MM HG: CPT | Mod: CPTII,S$GLB,, | Performed by: PSYCHIATRY & NEUROLOGY

## 2022-07-14 PROCEDURE — 1159F MED LIST DOCD IN RCRD: CPT | Mod: CPTII,S$GLB,, | Performed by: PSYCHIATRY & NEUROLOGY

## 2022-07-14 PROCEDURE — 3008F BODY MASS INDEX DOCD: CPT | Mod: CPTII,S$GLB,, | Performed by: PSYCHIATRY & NEUROLOGY

## 2022-07-14 PROCEDURE — 3077F SYST BP >= 140 MM HG: CPT | Mod: CPTII,S$GLB,, | Performed by: PSYCHIATRY & NEUROLOGY

## 2022-07-14 PROCEDURE — 3008F PR BODY MASS INDEX (BMI) DOCUMENTED: ICD-10-PCS | Mod: CPTII,S$GLB,, | Performed by: PSYCHIATRY & NEUROLOGY

## 2022-07-14 RX ORDER — FAMOTIDINE 40 MG/1
40 TABLET, FILM COATED ORAL DAILY
COMMUNITY
Start: 2022-07-11 | End: 2023-10-25

## 2022-07-14 NOTE — ASSESSMENT & PLAN NOTE
Continue medications at their current doses: Vimpat 150 mg twice daily, topiramate 100 mg twice daily, clonazepam 0.5 mg at bedtime, and gabapentin 300 mg at bedtime

## 2022-07-14 NOTE — PROGRESS NOTES
Chief Complaint   Patient presents with    Seizures     Pt states having 15 seizures since May states states was informed by PCP and lab work was not drinking enough water has now increased water intake has been doing well since increase having some stomach issues has a follow up appointment with gastro in august     Anxiety     Anxiety doing well denies difficulties in this area        This is a 61 y.o. female here for follow up for Patient tells me she has had about 6 seizures since her last visit which was in April 2021. Her last seizure was on the way here to her appointment. She tells me it consisted of staring off and jerking of her right leg followed by about 30 minutes of decreased responsiveness. She did not sleep well last night so has been thinks this may have contributed to her seizure today. She tells me she thinks some of her seizures have been related to anxiety. Dr. Leon tried to increase her venlafaxine to 37.5 mg twice daily, but patient states that her insurance would not fill it. They did not try ordering a 75 mg tablet to be taken daily. Recall, her seizure frequency has fluctuated historically. She does not drive.    recall has glaucoma so cannot increase top. vision is stable    Seizure meds:  vimpat 150 BID  top 100 BID  clonazepam 0.5 q pm  gbp 200 QPM      Medication List with Changes/Refills   Current Medications    CLONAZEPAM (KLONOPIN) 1 MG TABLET    Take 0.5 tablets (0.5 mg total) by mouth nightly.    CLORAZEPATE (TRANXENE) 7.5 MG TAB    Take 7.5 mg by mouth.    FAMOTIDINE (PEPCID) 40 MG TABLET    Take 40 mg by mouth once daily.    GABAPENTIN (NEURONTIN) 100 MG CAPSULE    Take 2 capsules (200 mg total) by mouth once daily.    GARLIC 1,000 MG CAP    Take by mouth.    HYDROXYZINE PAMOATE (VISTARIL) 25 MG CAP      See Instructions, TAKE TWO CAPSULES BY MOUTH TWICE A DAY, # 120 cap(s), 5 Refill(s), Pharmacy: [facility]/pharmacy #[id number], 167, cm, Height/Length Dosing, 08/24/21 9:07:00 CDT, 122.6,  kg, Weight Dosing, 08/24/21 9:07:00 CDT    LACOSAMIDE (VIMPAT) 150 MG TAB TABLET    Take 1 tablet (150 mg total) by mouth 2 (two) times a day. 1 tab(s) (150 mg), BID    LATANOPROST 0.005 % OPHTHALMIC SOLUTION    Place 1 drop into both eyes nightly.    LISINOPRIL (PRINIVIL,ZESTRIL) 40 MG TABLET      See Instructions, TAKE ONE TABLET BY MOUTH EVERY DAY, # 90 tab(s), 3 Refill(s), Pharmacy: [facility]/pharmacy #[id number], 167, cm, Height/Length Dosing, 03/21/22 10:39:00 CDT, 123.45, kg, Weight Dosing, 03/21/22 10:39:00 CDT    TOPIRAMATE (TOPAMAX) 100 MG TABLET    TAKE 1 TABLET BY MOUTH TWICE A DAY    VENLAFAXINE (EFFEXOR-XR) 75 MG 24 HR CAPSULE    TAKE 1 CAPSULE BY MOUTH EVERY DAY        Vitals:    07/14/22 1323   BP: (!) 140/90   Pulse: 80        General: alert and oriented, no acute distress, no audible wheezes, pulse intact, no edema    Cognition and Comprehension  Speech and language intact  Follows commands  Speech fluent  Attention intact  Memory for recent events intact from history taking  Affect pleasant  Fund of knowledge adequate    Cranial nerves  II. Optic: Visual fields full to confrontation both eyes  III, IV, VI. Oculomotor: Intact, Pupils equal, round and reactive to light, no nystagmus  V. Trigeminal: sensation to light touch normal  VII. No facial asymmetry or facial weakness  VIII. Hearing intact to spoken voice  IX/X. Glossopharyngeal/Vagus: Voice normal, palate rises symmetrically  XI. Axillary: Shoulder shrug normal  XII. Hypoglossal: Intact    Muscle Strength and Tone  Normal upper extremity tone  Normal lower extremity tone  Normal upper extremity strength  Normal lower extremity strength      Coordination and Gait  Finger to nose normal  Gait normal     1. Low back pain without sciatica, unspecified back pain laterality, unspecified chronicity  -     Ambulatory referral/consult to Physical/Occupational Therapy    2. Nonintractable epilepsy without status epilepticus, unspecified epilepsy  "type  Overview:  Has had seizures since the age of 13. Initial seizure was in the setting of acute illness with hydronephrosis and continued to have seizures frequently. Used to stare and throw dolls at her sister and did not know they were seizures.  SHe used to have 30-40 seizures a month. Currently seizure frequency is about 1 every 6 months. She is on vimpat 200 BID, topamax 200 BID, gabapentin 300 BID. She also takes clorazepate, clonazepam 1 BID. She has 2 types of seizures. First type is characterized by starting with "lord sola help me" and she makes sign of cross, then stares, hits her leg, falls to ground if standing lasting for 1-2 minutes. has postictal confusion for 5 minutes to 15 minutes. Has never had a generalized tonic clonic seizure witnessed. Second type is nocturnal. She has had a seizure in her sleep and woke up with tongue bitten. Overall she is very happy with her seizure frequency currently as it is much improved. She does complain of short-term memory problems, which she attributes to the medications.      Assessment & Plan:  Continue medications at their current doses: Vimpat 150 mg twice daily, topiramate 100 mg twice daily, clonazepam 0.5 mg at bedtime, and gabapentin 300 mg at bedtime           "

## 2022-07-20 ENCOUNTER — TELEPHONE (OUTPATIENT)
Dept: FAMILY MEDICINE | Facility: CLINIC | Age: 62
End: 2022-07-20
Payer: MEDICARE

## 2022-07-20 NOTE — TELEPHONE ENCOUNTER
----- Message from Grazyna Chung sent at 7/20/2022  9:22 AM CDT -----  Regarding: Referrals  .Type:  Needs Medical Advice    Who Called: [facility]  Symptoms (please be specific):    How long has patient had these symptoms:    Pharmacy name and phone #:    Would the patient rather a call back or a response via [facility]? Call back  Best Call Back Number: [phone]  Additional Information: Needs referral sent to Physical Therapy sent on pt's behalf..

## 2022-07-29 ENCOUNTER — TELEPHONE (OUTPATIENT)
Dept: NEUROLOGY | Facility: CLINIC | Age: 62
End: 2022-07-29
Payer: MEDICARE

## 2022-07-29 NOTE — TELEPHONE ENCOUNTER
----- Message from Alta Mcdonough sent at 7/20/2022  4:42 PM CDT -----  Regarding: FW: referral  Please see below. Patient would like PT referral sent to [facility] fax # [phone]  ----- Message -----  From: Erma Ruggiero LPN  Sent: 7/18/2022   2:19 PM CDT  To: Alta Mcdonough  Subject: FW: referral                                       ----- Message -----  From: KEILA Merino  Sent: 7/18/2022   2:14 PM CDT  To: Erma Ruggiero LPN  Subject: RE: referral                                     This referral was already placed during clinic visit with Dr. Leon 7/14/2022    Please see referral and start process    Thanks      ----- Message -----  From: Erma Ruggiero LPN  Sent: 7/18/2022   2:07 PM CDT  To: Mary Leon MD  Subject: FW: referral                                     Bassam with [facility] called to request a referral for the patient to see PT. Please advise on referral   ----- Message -----  From: Eryn Reid  Sent: 7/18/2022  11:47 AM CDT  To: Carolyn Liu  Subject: referral                                         Type:  Patient Requesting Referral    Who Called: bassam @ [facility]  Does the patient already have the specialty appointment scheduled?:no  If yes, what is the date of that appointment?:  Referral to What Specialty:physical therapy  Reason for Referral: back pain  Does the patient want the referral with a specific physician?:Jacquie Blue tel # [phone]  Is the specialist an [facility]?:  Patient Requesting a Response?:c/b  Would the patient rather a call back or a response via [facility]? C/b  Best Call Back Number:[phone]  Additional Information:

## 2022-08-17 ENCOUNTER — TELEPHONE (OUTPATIENT)
Dept: FAMILY MEDICINE | Facility: CLINIC | Age: 62
End: 2022-08-17
Payer: MEDICARE

## 2022-08-17 NOTE — TELEPHONE ENCOUNTER
----- Message from Grazyna Chung sent at 8/17/2022  9:07 AM CDT -----  Regarding: Covid Positive  .Type:  Needs Medical Advice    Who Called: Pt  Symptoms (please be specific):Left side lung pain when she breaths or cough  How long has patient had these symptoms:    Pharm[facility] and phone #: [id number] - [place], [place] - [address]  Would the patient rather a call back or a response via [facility]? Call back  Best Call Back Number: [phone]  Additional Information: Pt went to [facility]  [address] [place] [address] [place] [address]   Call Back [phone]   Fax:[phone]  Pt wants Dr Leon to obtain visit notes from clinic, pt is on Benzonatate 200 mg & also Albuterol, and Tylenol OTC..  Pt is Covid Positive.

## 2022-08-18 ENCOUNTER — TELEPHONE (OUTPATIENT)
Dept: FAMILY MEDICINE | Facility: CLINIC | Age: 62
End: 2022-08-18
Payer: MEDICARE

## 2022-08-18 ENCOUNTER — PATIENT MESSAGE (OUTPATIENT)
Dept: FAMILY MEDICINE | Facility: CLINIC | Age: 62
End: 2022-08-18

## 2022-08-18 ENCOUNTER — OFFICE VISIT (OUTPATIENT)
Dept: FAMILY MEDICINE | Facility: CLINIC | Age: 62
End: 2022-08-18
Payer: MEDICARE

## 2022-08-18 VITALS
HEIGHT: 65 IN | OXYGEN SATURATION: 95 % | WEIGHT: 280 LBS | SYSTOLIC BLOOD PRESSURE: 134 MMHG | DIASTOLIC BLOOD PRESSURE: 74 MMHG | BODY MASS INDEX: 46.65 KG/M2 | HEART RATE: 89 BPM

## 2022-08-18 DIAGNOSIS — U07.1 COVID-19: Primary | ICD-10-CM

## 2022-08-18 PROCEDURE — 99214 PR OFFICE/OUTPT VISIT, EST, LEVL IV, 30-39 MIN: ICD-10-PCS | Mod: 95,,, | Performed by: NURSE PRACTITIONER

## 2022-08-18 PROCEDURE — 1159F MED LIST DOCD IN RCRD: CPT | Mod: CPTII,95,, | Performed by: NURSE PRACTITIONER

## 2022-08-18 PROCEDURE — 99214 OFFICE O/P EST MOD 30 MIN: CPT | Mod: 95,,, | Performed by: NURSE PRACTITIONER

## 2022-08-18 PROCEDURE — 4010F ACE/ARB THERAPY RXD/TAKEN: CPT | Mod: CPTII,95,, | Performed by: NURSE PRACTITIONER

## 2022-08-18 PROCEDURE — 3075F PR MOST RECENT SYSTOLIC BLOOD PRESS GE 130-139MM HG: ICD-10-PCS | Mod: CPTII,95,, | Performed by: NURSE PRACTITIONER

## 2022-08-18 PROCEDURE — 3008F BODY MASS INDEX DOCD: CPT | Mod: CPTII,95,, | Performed by: NURSE PRACTITIONER

## 2022-08-18 PROCEDURE — 3078F DIAST BP <80 MM HG: CPT | Mod: CPTII,95,, | Performed by: NURSE PRACTITIONER

## 2022-08-18 PROCEDURE — 4010F PR ACE/ARB THEARPY RXD/TAKEN: ICD-10-PCS | Mod: CPTII,95,, | Performed by: NURSE PRACTITIONER

## 2022-08-18 PROCEDURE — 3008F PR BODY MASS INDEX (BMI) DOCUMENTED: ICD-10-PCS | Mod: CPTII,95,, | Performed by: NURSE PRACTITIONER

## 2022-08-18 PROCEDURE — 3078F PR MOST RECENT DIASTOLIC BLOOD PRESSURE < 80 MM HG: ICD-10-PCS | Mod: CPTII,95,, | Performed by: NURSE PRACTITIONER

## 2022-08-18 PROCEDURE — 1159F PR MEDICATION LIST DOCUMENTED IN MEDICAL RECORD: ICD-10-PCS | Mod: CPTII,95,, | Performed by: NURSE PRACTITIONER

## 2022-08-18 PROCEDURE — 3075F SYST BP GE 130 - 139MM HG: CPT | Mod: CPTII,95,, | Performed by: NURSE PRACTITIONER

## 2022-08-18 RX ORDER — ALBUTEROL SULFATE 90 UG/1
AEROSOL, METERED RESPIRATORY (INHALATION)
COMMUNITY
Start: 2022-08-16 | End: 2023-09-20 | Stop reason: SDUPTHER

## 2022-08-18 RX ORDER — PROMETHAZINE HYDROCHLORIDE AND DEXTROMETHORPHAN HYDROBROMIDE 6.25; 15 MG/5ML; MG/5ML
5 SYRUP ORAL EVERY 4 HOURS PRN
Qty: 120 ML | Refills: 0 | Status: SHIPPED | OUTPATIENT
Start: 2022-08-18 | End: 2022-08-28

## 2022-08-18 NOTE — PROGRESS NOTES
Subjective:      Patient ID: Christie Marcum is a 61 y.o. Black or  female      Chief Complaint: Follow-up (Cough, stabbing pain in chest w/wo coughing )     This is a telemedicine note. Patient was treated using telemedicine, real-time audio and video. ELVA, distant provider, conducted the visit from location identified below. The patient participated in the visit at a non- [facility] location selected by the patient identified below. I am licensed in the state where the patient stated they are located. The patient stated that they understood and accepted the privacy and security risks to their information at their location.  Patient was located at home.  I, distant provider, was located at [facility]        Past Medical History:   Diagnosis Date    Anxiety     Arthritis     Cancer     Cancer of splenic flexure     COPD (chronic obstructive pulmonary disease)     Depression     Emphysema lung     Hip pain     Hypertension     Lower back pain     OAB (overactive bladder)     KHANH (obstructive sleep apnea)     KHANH on CPAP     Oxygen dependent     Seizures         HPI  Cough  This is a new problem. Episode onset: Monday, 8/15/2022. The problem has been unchanged. The cough is non-productive. Associated symptoms include a fever and wheezing. Pertinent negatives include no chest pain, chills, eye redness, nasal congestion, sore throat or shortness of breath. Associated symptoms comments: Chest wall pain when coughing. Treatments tried: Albuterol Inhaler and Tessalon Perles. The treatment provided no relief. Her past medical history is significant for COPD.   Evaluated per C in which EKG, CXR, Flu,, Strep all negative.  States Covid-19 testing is positive.  Given Albuterol and Tessalon Perles without any improvement of cough.    Review of Systems   Constitutional: Positive for fatigue and fever. Negative for appetite change and chills.   HENT: Negative.  Negative for sore throat.     Eyes: Negative.  Negative for discharge and redness.   Respiratory: Positive for cough and wheezing. Negative for shortness of breath.    Cardiovascular: Negative.  Negative for chest pain.        Chest wall tenderness when coughing   Gastrointestinal: Negative.    Endocrine: Negative.    Genitourinary: Negative.    Integumentary:  Negative.   Allergic/Immunologic: Negative.    Neurological: Negative.    Psychiatric/Behavioral: Negative.    All other systems reviewed and are negative.         Objective:      Vitals:    08/18/22 1458   BP: 134/74   Pulse: 89      Body mass index is 46.59 kg/m².     Physical Exam  Constitutional:       Appearance: Normal appearance.   HENT:      Head: Normocephalic.   Pulmonary:      Effort: Pulmonary effort is normal. No respiratory distress.   Neurological:      Mental Status: She is alert and oriented to person, place, and time.   Psychiatric:         Mood and Affect: Mood normal.         Behavior: Behavior normal.            Current Outpatient Medications:     albuterol (PROVENTIL/VENTOLIN HFA) 90 mcg/actuation inhaler, Inhale into the lungs., Disp: , Rfl:     clonazePAM (KLONOPIN) 1 MG tablet, Take 0.5 tablets (0.5 mg total) by mouth nightly., Disp: 15 tablet, Rfl: 3    clorazepate (TRANXENE) 7.5 MG Tab, Take 7.5 mg by mouth., Disp: , Rfl:     famotidine (PEPCID) 40 MG tablet, Take 40 mg by mouth once daily., Disp: , Rfl:     gabapentin (NEURONTIN) 100 MG capsule, Take 2 capsules (200 mg total) by mouth once daily., Disp: 180 capsule, Rfl: 3    garlic 1,000 mg Cap, Take by mouth., Disp: , Rfl:     hydrOXYzine pamoate (VISTARIL) 25 MG Cap,  See Instructions, TAKE TWO CAPSULES BY MOUTH TWICE A DAY, # 120 cap(s), 5 Refill(s), Pharmacy: [facility]/pharmacy #[id number], 167, cm, Height/Length Dosing, 08/24/21 9:07:00 CDT, 122.6, kg, Weight Dosing, 08/24/21 9:07:00 CDT, Disp: , Rfl:     lacosamide (VIMPAT) 150 mg Tab tablet, Take 1 tablet (150 mg total) by mouth 2 (two) times a day. 1  tab(s) (150 mg), BID, Disp: 60 tablet, Rfl: 3    latanoprost 0.005 % ophthalmic solution, Place 1 drop into both eyes nightly., Disp: , Rfl:     lisinopriL (PRINIVIL,ZESTRIL) 40 MG tablet,  See Instructions, TAKE ONE TABLET BY MOUTH EVERY DAY, # 90 tab(s), 3 Refill(s), Pharmacy: [facility]/pharmacy #[id number], 167, cm, Height/Length Dosing, 03/21/22 10:39:00 CDT, 123.45, kg, Weight Dosing, 03/21/22 10:39:00 CDT, Disp: , Rfl:     topiramate (TOPAMAX) 100 MG tablet, TAKE 1 TABLET BY MOUTH TWICE A DAY, Disp: 180 tablet, Rfl: 1    venlafaxine (EFFEXOR-XR) 75 MG 24 hr capsule, TAKE 1 CAPSULE BY MOUTH EVERY DAY, Disp: 90 capsule, Rfl: 2    nirmatrelvir-ritonavir 300 mg (150 mg x 2)-100 mg copackaged tablets (EUA), Take 2 tablets by mouth 2 (two) times daily for 5 days. Each dose contains 1 nirmatrelvir (pink tablet) and 1 ritonavir (white tablet). Take both tablets together, Disp: 20 tablet, Rfl: 0    promethazine-dextromethorphan (PROMETHAZINE-DM) 6.25-15 mg/5 mL Syrp, Take 5 mLs by mouth every 4 (four) hours as needed (cough)., Disp: 120 mL, Rfl: 0    Assessment & Plan:     Problem List Items Addressed This Visit        ID    COVID-19 - Primary     Rx Paxlovid (decreased renal dose)  Promethazine DM prn cough  Educated on quarantine measures  Instructed to continue to monitor symptoms and to report to ED for any SOB, CP, and/or any worsening symptoms  Patient is agreeable to plan and verbalizes understanding           Relevant Medications    promethazine-dextromethorphan (PROMETHAZINE-DM) 6.25-15 mg/5 mL Syrp    nirmatrelvir-ritonavir 300 mg (150 mg x 2)-100 mg copackaged tablets (EUA)

## 2022-08-18 NOTE — TELEPHONE ENCOUNTER
----- Message from Grazyna Chung sent at 8/18/2022  9:04 AM CDT -----  Regarding: Med Advice  .Type:  Needs Medical Advice    Who Called: Pt  Symptoms (please be specific): Coughing   How long has patient had these symptoms:  1 day  [id number] - [place], [place] - [address]    Would the patient rather a call back or a response via [facility]?   Best Call Back Number: [phone]  Additional Information: pt states that the urgent care prescribed that meds for cough and it's not giving her relief.. would like for nurse to f/u

## 2022-08-18 NOTE — ASSESSMENT & PLAN NOTE
Rx Paxlovid (decreased renal dose)  Promethazine DM prn cough  Educated on quarantine measures  Instructed to continue to monitor symptoms and to report to ED for any SOB, CP, and/or any worsening symptoms  Patient is agreeable to plan and verbalizes understanding

## 2022-09-13 ENCOUNTER — LAB VISIT (OUTPATIENT)
Dept: LAB | Facility: HOSPITAL | Age: 62
End: 2022-09-13
Attending: FAMILY MEDICINE
Payer: MEDICARE

## 2022-09-13 DIAGNOSIS — R63.5 WEIGHT GAIN: ICD-10-CM

## 2022-09-13 DIAGNOSIS — F41.9 ANXIETY: ICD-10-CM

## 2022-09-13 DIAGNOSIS — H40.9 GLAUCOMA, UNSPECIFIED GLAUCOMA TYPE, UNSPECIFIED LATERALITY: ICD-10-CM

## 2022-09-13 DIAGNOSIS — G47.33 OBSTRUCTIVE SLEEP APNEA ON CPAP: ICD-10-CM

## 2022-09-13 DIAGNOSIS — Z00.00 WELLNESS EXAMINATION: Primary | ICD-10-CM

## 2022-09-13 DIAGNOSIS — I10 ESSENTIAL HYPERTENSION, BENIGN: ICD-10-CM

## 2022-09-13 DIAGNOSIS — E65 OBESE ABDOMEN: ICD-10-CM

## 2022-09-13 DIAGNOSIS — G40.909 EPILEPSY: ICD-10-CM

## 2022-09-13 DIAGNOSIS — C18.5 CANCER OF SPLENIC FLEXURE: ICD-10-CM

## 2022-09-13 DIAGNOSIS — L65.9 HAIR LOSS: ICD-10-CM

## 2022-09-13 DIAGNOSIS — J43.9 EMPHYSEMA/COPD: ICD-10-CM

## 2022-09-13 DIAGNOSIS — I10 HYPERTENSION, UNSPECIFIED TYPE: ICD-10-CM

## 2022-09-13 DIAGNOSIS — R73.09 OTHER ABNORMAL GLUCOSE: ICD-10-CM

## 2022-09-13 DIAGNOSIS — F32.A DEPRESSION, UNSPECIFIED DEPRESSION TYPE: ICD-10-CM

## 2022-09-13 LAB
ALBUMIN SERPL-MCNC: 3.8 GM/DL (ref 3.4–4.8)
ALBUMIN/GLOB SERPL: 0.9 RATIO (ref 1.1–2)
ALP SERPL-CCNC: 67 UNIT/L (ref 40–150)
ALT SERPL-CCNC: 27 UNIT/L (ref 0–55)
APPEARANCE UR: ABNORMAL
AST SERPL-CCNC: 28 UNIT/L (ref 5–34)
BASOPHILS # BLD AUTO: 0.04 X10(3)/MCL (ref 0–0.2)
BASOPHILS NFR BLD AUTO: 0.5 %
BILIRUB UR QL STRIP.AUTO: NEGATIVE MG/DL
BILIRUBIN DIRECT+TOT PNL SERPL-MCNC: 0.5 MG/DL
BUN SERPL-MCNC: 11.1 MG/DL (ref 9.8–20.1)
CALCIUM SERPL-MCNC: 9.4 MG/DL (ref 8.4–10.2)
CHLORIDE SERPL-SCNC: 111 MMOL/L (ref 98–107)
CHOLEST SERPL-MCNC: 196 MG/DL
CHOLEST/HDLC SERPL: 4 {RATIO} (ref 0–5)
CO2 SERPL-SCNC: 21 MMOL/L (ref 23–31)
COLOR UR AUTO: YELLOW
CREAT SERPL-MCNC: 1.11 MG/DL (ref 0.55–1.02)
EOSINOPHIL # BLD AUTO: 0.5 X10(3)/MCL (ref 0–0.9)
EOSINOPHIL NFR BLD AUTO: 6 %
ERYTHROCYTE [DISTWIDTH] IN BLOOD BY AUTOMATED COUNT: 13.2 % (ref 11.5–17)
EST. AVERAGE GLUCOSE BLD GHB EST-MCNC: 111.2 MG/DL
GFR SERPLBLD CREATININE-BSD FMLA CKD-EPI: 56 MLS/MIN/1.73/M2
GLOBULIN SER-MCNC: 4.2 GM/DL (ref 2.4–3.5)
GLUCOSE SERPL-MCNC: 116 MG/DL (ref 82–115)
GLUCOSE UR QL STRIP.AUTO: NEGATIVE MG/DL
HBA1C MFR BLD: 5.5 %
HCT VFR BLD AUTO: 46 % (ref 37–47)
HDLC SERPL-MCNC: 44 MG/DL (ref 35–60)
HGB BLD-MCNC: 14.6 GM/DL (ref 12–16)
IMM GRANULOCYTES # BLD AUTO: 0.09 X10(3)/MCL (ref 0–0.04)
IMM GRANULOCYTES NFR BLD AUTO: 1.1 %
KETONES UR QL STRIP.AUTO: NEGATIVE MG/DL
LDLC SERPL CALC-MCNC: 123 MG/DL (ref 50–140)
LEUKOCYTE ESTERASE UR QL STRIP.AUTO: NEGATIVE UNIT/L
LYMPHOCYTES # BLD AUTO: 1.77 X10(3)/MCL (ref 0.6–4.6)
LYMPHOCYTES NFR BLD AUTO: 21.3 %
MCH RBC QN AUTO: 28.8 PG (ref 27–31)
MCHC RBC AUTO-ENTMCNC: 31.7 MG/DL (ref 33–36)
MCV RBC AUTO: 90.7 FL (ref 80–94)
MONOCYTES # BLD AUTO: 0.79 X10(3)/MCL (ref 0.1–1.3)
MONOCYTES NFR BLD AUTO: 9.5 %
NEUTROPHILS # BLD AUTO: 5.1 X10(3)/MCL (ref 2.1–9.2)
NEUTROPHILS NFR BLD AUTO: 61.6 %
NITRITE UR QL STRIP.AUTO: NEGATIVE
NRBC BLD AUTO-RTO: 0 %
PH UR STRIP.AUTO: 6 [PH]
PLATELET # BLD AUTO: 232 X10(3)/MCL (ref 130–400)
PMV BLD AUTO: 9.9 FL (ref 7.4–10.4)
POTASSIUM SERPL-SCNC: 4.2 MMOL/L (ref 3.5–5.1)
PROT SERPL-MCNC: 8 GM/DL (ref 5.8–7.6)
PROT UR QL STRIP.AUTO: NEGATIVE MG/DL
RBC # BLD AUTO: 5.07 X10(6)/MCL (ref 4.2–5.4)
RBC UR QL AUTO: NEGATIVE UNIT/L
SODIUM SERPL-SCNC: 143 MMOL/L (ref 136–145)
SP GR UR STRIP.AUTO: 1.02
TRIGL SERPL-MCNC: 144 MG/DL (ref 37–140)
TSH SERPL-ACNC: 2.16 UIU/ML (ref 0.35–4.94)
UROBILINOGEN UR STRIP-ACNC: 0.2 MG/DL
VLDLC SERPL CALC-MCNC: 29 MG/DL
WBC # SPEC AUTO: 8.3 X10(3)/MCL (ref 4.5–11.5)

## 2022-09-13 PROCEDURE — 85025 COMPLETE CBC W/AUTO DIFF WBC: CPT

## 2022-09-13 PROCEDURE — 80053 COMPREHEN METABOLIC PANEL: CPT

## 2022-09-13 PROCEDURE — 80061 LIPID PANEL: CPT

## 2022-09-13 PROCEDURE — 36415 COLL VENOUS BLD VENIPUNCTURE: CPT

## 2022-09-13 PROCEDURE — 83036 HEMOGLOBIN GLYCOSYLATED A1C: CPT

## 2022-09-13 PROCEDURE — 84443 ASSAY THYROID STIM HORMONE: CPT

## 2022-09-13 PROCEDURE — 81003 URINALYSIS AUTO W/O SCOPE: CPT

## 2022-09-13 NOTE — PROGRESS NOTES
"Labs are wnl except  >chloride is elevated. Has she been eating more salt? Avoid. Encourage fluids  >creatinine is elevated but improved from previous. Encourage fluids.     Has she been able to establish with new lung doctor?    These results have been reviewed by your healthcare team.  You are advised to continue your current medications.    Please keep in mind that results may often be outside of the "normal" range while still being acceptable for your diagnosis and your plan of care.  You will be contacted if your results require a change in your medical treatment. If you wish to discuss your results, you will be required to schedule an appointment.   "

## 2022-09-14 ENCOUNTER — TELEPHONE (OUTPATIENT)
Dept: FAMILY MEDICINE | Facility: CLINIC | Age: 62
End: 2022-09-14
Payer: MEDICARE

## 2022-09-14 DIAGNOSIS — Z12.31 BREAST CANCER SCREENING BY MAMMOGRAM: Primary | ICD-10-CM

## 2022-09-14 NOTE — TELEPHONE ENCOUNTER
----- Message from Shanelle Berry sent at 9/14/2022  2:07 PM CDT -----  Regarding: mammo request  Type:  Mammogram    Caller is requesting to schedule their annual mammogram appointment.  Order is not listed in EPIC.  Please enter order and contact patient to schedule.  Name of Caller:Yuliya  Where would they like the mammogram performed?[facility] [address] [facility]  Would the patient rather a call back or a response via [facility]?   Best Call Back Number:[phone]  Additional Information: Patient is asking for mammo orders. Please call patient back.

## 2022-09-14 NOTE — TELEPHONE ENCOUNTER
I have signed for the following orders.  Please call the patient and ask the patient to schedule the testing     Orders Placed This Encounter   Procedures    Mammo Digital Screening Bilat w/ Tyler     Standing Status:   Future     Standing Expiration Date:   9/14/2024     Scheduling Instructions:      AVE     Order Specific Question:   May the Radiologist modify the order per protocol to meet the clinical needs of the patient?     Answer:   Yes     Order Specific Question:   Release to patient     Answer:   Immediate

## 2022-09-21 PROBLEM — M54.50 LOW BACK PAIN: Status: ACTIVE | Noted: 2022-09-21

## 2022-09-21 PROBLEM — Z99.81 ON HOME OXYGEN THERAPY: Status: ACTIVE | Noted: 2022-09-21

## 2022-09-21 PROBLEM — J45.40 MODERATE PERSISTENT ASTHMA WITHOUT COMPLICATION: Status: ACTIVE | Noted: 2022-08-25

## 2022-09-21 PROBLEM — M19.90 ARTHRITIS: Status: ACTIVE | Noted: 2022-09-21

## 2022-09-21 PROBLEM — M25.559 ARTHRALGIA OF HIP: Status: ACTIVE | Noted: 2022-09-21

## 2022-09-22 DIAGNOSIS — G40.209 PARTIAL SYMPTOMATIC EPILEPSY WITH COMPLEX PARTIAL SEIZURES, NOT INTRACTABLE, WITHOUT STATUS EPILEPTICUS: ICD-10-CM

## 2022-09-22 RX ORDER — LACOSAMIDE 150 MG/1
150 TABLET ORAL 2 TIMES DAILY
Qty: 60 TABLET | Refills: 3 | Status: SHIPPED | OUTPATIENT
Start: 2022-09-22 | End: 2023-01-18

## 2022-09-22 NOTE — TELEPHONE ENCOUNTER
Medication: Vimpat 150 mg    [facility]    Last Appointment: 07/14/2022    Next Appointment: 01/17/2023

## 2022-10-07 DIAGNOSIS — G40.209 PARTIAL SYMPTOMATIC EPILEPSY WITH COMPLEX PARTIAL SEIZURES, NOT INTRACTABLE, WITHOUT STATUS EPILEPTICUS: ICD-10-CM

## 2022-10-07 RX ORDER — CLONAZEPAM 1 MG/1
0.5 TABLET ORAL NIGHTLY
Qty: 15 TABLET | Refills: 3 | Status: SHIPPED | OUTPATIENT
Start: 2022-10-07 | End: 2023-02-06

## 2022-10-07 NOTE — TELEPHONE ENCOUNTER
Medication: Clonazepam 1 mg    Pharmacy: [facility]    Last Appointment: 07/14/2022    Next Appointment: 01/17/2023

## 2022-10-10 PROBLEM — Z00.00 MEDICARE ANNUAL WELLNESS VISIT, SUBSEQUENT: Status: RESOLVED | Noted: 2022-07-05 | Resolved: 2022-10-10

## 2022-10-17 ENCOUNTER — TELEPHONE (OUTPATIENT)
Dept: FAMILY MEDICINE | Facility: CLINIC | Age: 62
End: 2022-10-17
Payer: MEDICARE

## 2022-10-17 NOTE — TELEPHONE ENCOUNTER
----- Message from Erma Ruggiero LPN sent at 10/17/2022 11:08 AM CDT -----  Lvm for return call for more information   ----- Message -----  From: Chance Crane  Sent: 10/17/2022   9:27 AM CDT  To: Carolyn MCDONALD Staff    .Type:  Needs Medical Advice    Who Called: pt  Symptoms (please be specific):  body shakes, and nervousness   How long has patient had these symptoms:  since she was taken off gabapentin   Pharmacy name and phone #:    Would the patient rather a call back or a response via [facility]? Call back  Best Call Back Number: [phone]  Additional Information:

## 2022-10-17 NOTE — TELEPHONE ENCOUNTER
Per my last note, she was on gabapentin 300mg and we decreased to 200mg.  Was she having these symptoms at 300mg?  She can go back up to 300mg and monitor symptoms. Does she need a new prescription sent to pharmacy on file?

## 2022-10-17 NOTE — TELEPHONE ENCOUNTER
"Patient returned my call see previous message. Patient states since getting off of gabapentin she has been having anxiety attacks regularly and feelings of nervousness. She states she :cant keep still" and shakes a lot. She would like to know what to do moving forward.   "

## 2022-10-18 ENCOUNTER — TELEPHONE (OUTPATIENT)
Dept: FAMILY MEDICINE | Facility: CLINIC | Age: 62
End: 2022-10-18
Payer: MEDICARE

## 2022-10-18 RX ORDER — VENLAFAXINE HYDROCHLORIDE 37.5 MG/1
37.5 CAPSULE, EXTENDED RELEASE ORAL DAILY
Qty: 30 CAPSULE | Refills: 11 | Status: SHIPPED | OUTPATIENT
Start: 2022-10-18 | End: 2023-07-17

## 2022-10-18 NOTE — TELEPHONE ENCOUNTER
I have signed for the following orders AND/OR meds.  Please call the patient and ask the patient to schedule the testing AND/OR inform about any medications that were sent.    Medications Ordered This Encounter   Medications    venlafaxine (EFFEXOR-XR) 37.5 MG 24 hr capsule     Sig: Take 1 capsule (37.5 mg total) by mouth once daily. To take with 75mg once daily     Dispense:  30 capsule     Refill:  11

## 2022-10-18 NOTE — TELEPHONE ENCOUNTER
Was she having side effects from the gabapentin?    Next smallest increase dose for effexor would be to add 37.5mg cap to take with her 75mg cap for a total of 112.5mg daily.  Would she like to try that? If yes, confirm pharmacy and I will send in

## 2022-10-18 NOTE — TELEPHONE ENCOUNTER
I spoke with the patient. She stated she was not having her symptoms while on the gabapentin, but she does not want to get back on it at this moment.she said she would like to try increasing her venlafaxine if she can. She is currently on 75 mg once daily. She said if you do not want to increase the venlafaxine she may be willing to try taking a low dose of gabapentin, but really prefers not to if she can avoid it. Please advise.

## 2022-11-02 ENCOUNTER — OFFICE VISIT (OUTPATIENT)
Dept: FAMILY MEDICINE | Facility: CLINIC | Age: 62
End: 2022-11-02
Payer: MEDICARE

## 2022-11-02 VITALS
TEMPERATURE: 98 F | WEIGHT: 267.5 LBS | HEART RATE: 83 BPM | BODY MASS INDEX: 44.57 KG/M2 | SYSTOLIC BLOOD PRESSURE: 134 MMHG | DIASTOLIC BLOOD PRESSURE: 82 MMHG | OXYGEN SATURATION: 98 % | HEIGHT: 65 IN | RESPIRATION RATE: 16 BRPM

## 2022-11-02 DIAGNOSIS — G47.33 OSA ON CPAP: ICD-10-CM

## 2022-11-02 DIAGNOSIS — Z83.3 FAMILY HISTORY OF DIABETES MELLITUS: ICD-10-CM

## 2022-11-02 DIAGNOSIS — F41.9 ANXIETY: ICD-10-CM

## 2022-11-02 DIAGNOSIS — C18.5 CANCER OF SPLENIC FLEXURE: ICD-10-CM

## 2022-11-02 DIAGNOSIS — Z71.89 ADVANCED CARE PLANNING/COUNSELING DISCUSSION: ICD-10-CM

## 2022-11-02 DIAGNOSIS — Z00.00 MEDICARE ANNUAL WELLNESS VISIT, SUBSEQUENT: Primary | ICD-10-CM

## 2022-11-02 DIAGNOSIS — I10 HYPERTENSION, UNSPECIFIED TYPE: ICD-10-CM

## 2022-11-02 DIAGNOSIS — Z99.81 ON HOME OXYGEN THERAPY: ICD-10-CM

## 2022-11-02 DIAGNOSIS — F32.A DEPRESSIVE DISORDER: ICD-10-CM

## 2022-11-02 DIAGNOSIS — K21.9 GASTROESOPHAGEAL REFLUX DISEASE, UNSPECIFIED WHETHER ESOPHAGITIS PRESENT: ICD-10-CM

## 2022-11-02 DIAGNOSIS — J44.9 CHRONIC OBSTRUCTIVE PULMONARY DISEASE, UNSPECIFIED COPD TYPE: ICD-10-CM

## 2022-11-02 DIAGNOSIS — H40.9 GLAUCOMA, UNSPECIFIED GLAUCOMA TYPE, UNSPECIFIED LATERALITY: ICD-10-CM

## 2022-11-02 DIAGNOSIS — E66.01 CLASS 3 SEVERE OBESITY WITH SERIOUS COMORBIDITY AND BODY MASS INDEX (BMI) OF 45.0 TO 49.9 IN ADULT, UNSPECIFIED OBESITY TYPE: ICD-10-CM

## 2022-11-02 DIAGNOSIS — J30.2 SEASONAL ALLERGIES: ICD-10-CM

## 2022-11-02 DIAGNOSIS — G40.909 NONINTRACTABLE EPILEPSY WITHOUT STATUS EPILEPTICUS, UNSPECIFIED EPILEPSY TYPE: ICD-10-CM

## 2022-11-02 DIAGNOSIS — N32.81 OVERACTIVE BLADDER: ICD-10-CM

## 2022-11-02 DIAGNOSIS — Z12.31 BREAST CANCER SCREENING BY MAMMOGRAM: ICD-10-CM

## 2022-11-02 DIAGNOSIS — Z78.0 POSTMENOPAUSAL: ICD-10-CM

## 2022-11-02 PROCEDURE — G0439 PR MEDICARE ANNUAL WELLNESS SUBSEQUENT VISIT: ICD-10-PCS | Mod: ,,, | Performed by: FAMILY MEDICINE

## 2022-11-02 PROCEDURE — G0439 PPPS, SUBSEQ VISIT: HCPCS | Mod: ,,, | Performed by: FAMILY MEDICINE

## 2022-11-02 PROCEDURE — 3008F BODY MASS INDEX DOCD: CPT | Mod: CPTII,,, | Performed by: FAMILY MEDICINE

## 2022-11-02 PROCEDURE — 4010F ACE/ARB THERAPY RXD/TAKEN: CPT | Mod: CPTII,,, | Performed by: FAMILY MEDICINE

## 2022-11-02 PROCEDURE — 1160F RVW MEDS BY RX/DR IN RCRD: CPT | Mod: CPTII,,, | Performed by: FAMILY MEDICINE

## 2022-11-02 PROCEDURE — 1160F PR REVIEW ALL MEDS BY PRESCRIBER/CLIN PHARMACIST DOCUMENTED: ICD-10-PCS | Mod: CPTII,,, | Performed by: FAMILY MEDICINE

## 2022-11-02 PROCEDURE — 3008F PR BODY MASS INDEX (BMI) DOCUMENTED: ICD-10-PCS | Mod: CPTII,,, | Performed by: FAMILY MEDICINE

## 2022-11-02 PROCEDURE — 4010F PR ACE/ARB THEARPY RXD/TAKEN: ICD-10-PCS | Mod: CPTII,,, | Performed by: FAMILY MEDICINE

## 2022-11-02 PROCEDURE — 3075F SYST BP GE 130 - 139MM HG: CPT | Mod: CPTII,,, | Performed by: FAMILY MEDICINE

## 2022-11-02 PROCEDURE — 3079F DIAST BP 80-89 MM HG: CPT | Mod: CPTII,,, | Performed by: FAMILY MEDICINE

## 2022-11-02 PROCEDURE — 3079F PR MOST RECENT DIASTOLIC BLOOD PRESSURE 80-89 MM HG: ICD-10-PCS | Mod: CPTII,,, | Performed by: FAMILY MEDICINE

## 2022-11-02 PROCEDURE — 3075F PR MOST RECENT SYSTOLIC BLOOD PRESS GE 130-139MM HG: ICD-10-PCS | Mod: CPTII,,, | Performed by: FAMILY MEDICINE

## 2022-11-02 PROCEDURE — 1159F PR MEDICATION LIST DOCUMENTED IN MEDICAL RECORD: ICD-10-PCS | Mod: CPTII,,, | Performed by: FAMILY MEDICINE

## 2022-11-02 PROCEDURE — 1159F MED LIST DOCD IN RCRD: CPT | Mod: CPTII,,, | Performed by: FAMILY MEDICINE

## 2022-11-02 RX ORDER — CHOLESTYRAMINE 4 G/9G
POWDER, FOR SUSPENSION ORAL
COMMUNITY
Start: 2022-10-26

## 2022-11-02 RX ORDER — HYDROXYZINE PAMOATE 25 MG/1
CAPSULE ORAL
COMMUNITY
Start: 2022-04-20 | End: 2023-01-25 | Stop reason: SDUPTHER

## 2022-11-02 RX ORDER — ONDANSETRON 4 MG/1
4 TABLET, ORALLY DISINTEGRATING ORAL EVERY 8 HOURS PRN
Qty: 15 TABLET | Refills: 0 | Status: SHIPPED | OUTPATIENT
Start: 2022-11-02 | End: 2023-06-05

## 2022-11-02 RX ORDER — INHALER,ASSIST DEVICE,ACCESORY
EACH MISCELLANEOUS
COMMUNITY
Start: 2022-09-20

## 2022-11-02 NOTE — PROGRESS NOTES
Patient ID: [id number]     Chief Complaint: Medicare AWV (Wellness/Patient reports recent memory issue that had not occurred before. She forgot her sister in laws name. /Calling [facility] for mammogram results)      HPI:     Christie Marcum is a 62 y.o. female here today for a Medicare Wellness.     Very pleasant patient presents to clinic with her friend, Dave, for her wellness visit.   She did lab work prior to her appt and it was reviewed with patient at time of appt today. Declines immunizations      Still seeing dr. Waggoner for her GI issues.    Did colonoscopy and egd at [facility]. We will request results.  Having weight loss and diarrhea.  Started on clenpiq.  Helped with diarrhea a lot-- last week had normal BMs.  This am had diarrhea.  Will call his office for follow up    She has a history of epilepsy. She is followed by neurology, Dr. Worley.  on vimpat, hydroxyzine, and klonopin qhs.  She weaned herself off gabapentin since 10/15/22.   states increased effexor dose has helped seizure activity-   we added effexor 37.5 to 75mg.   Seizures levelled off.   Has appt with neuro 1/2023.  feels like loud noises set off seizures.  Not sure if memory concern is related to seizures    Has seasonal allergies and is on allegra.     She has a history of KHANH on CPAP, COPD and emphysema and is prescribed oxygen therapy 24/7. Her pulmonologist was Dr. Terry. She was discharged from his office 12/2021.  she does not have her O2 today because insurance/pulmonology office is giving her trouble. she is not using her cpap either due to same issues.  she is using [employer] in [place]. she is not wearing o2 today. She established with dr. Mortensen.  She thinks patient may have asthma and not copd.  Doing more testing with her. Reports compliance with her inhaler.     has anxiety/ depression and is on effexor       had macho khan at [facility] 7/26/2021 with dr. lorena cortes. gave norco and pantoprazole and made her  seizures worse. once stopped meds, seizures stopped. she has followed up post op with him.      She has a history of cancer of the splenic flexure and surgery and reanastomosis. She is followed by Dr. Gillespie. saw them last 4/2021. took mediport out 6/2021. Previously saw dr. doyle. he no longer takes her insurance. is now seeing drEstrellita yepez in [place]. only seeing prn. has appt in 2 years for colonoscopy     She has a has a history of overactive bladder and urinary incontinence. Her urologist is Dr. Flor. has seen him in 2020. not currently on meds for her bladder. not currently seeing him. The patient had hallucinations with oxybutynin. She tolerated Toviaz however she is having trouble with her insurance paying for this. They also discussed possible Botox injections.     Has a history of hypertension and is compliant with her medication. she is on lisinopril qhs. refuses asa     has glaucoma. [facility] However she states she will now be seeing [facility] opht[facility].    She is obese     Last pap smear 5/2019 and was normal and HPV negative. She reports a history of partial hysterectomy at age 29. She still has her ovaries. She has had abnormal mammogram on 5/7/19 with subsequent breast biopsy done at [facility] of bilateral breasts which showed fibroadenoma and stromal fibrosis. mmg 7/17/2020 at [facility] was normal. at her visit on 8/24/21 she reported felt lumps in right breast. states did mmg at [facility] 9/2021 which was normal. copy in chart. recommended annual screening.     She is ALLERGIC to Keflex . She is also ALLERGIC to Aleve, codeine, naproxen, tetracycline and Ultram . she reports a positive family history of diabetes in older sister.  got 2022 flu shot.      ----------------------------  Anxiety  Arthritis  Cancer of splenic flexure  COPD (chronic obstructive pulmonary disease)  Hypertension  KHANH on CPAP  Oxygen dependent     Past Surgical History:   Procedure  Laterality Date    COLON SURGERY      COLONOSCOPY      COMPLEX CYSTOMETROGRAM      EMG      HYSTERECTOMY      Left breas biopsy      Right breast biopsy         Review of patient's allergies indicates:   Allergen Reactions    Cephalexin      Other reaction(s): Seizure  hallucinations      Ciprofloxacin      Other reaction(s): Seiurus    Codeine      Other reaction(s): Seizure  seizure      Metronidazole      Other reaction(s): Seizure    Naproxen      Other reaction(s): Seizure  Nausea/seizure      Tetracycline      Other reaction(s): Seizure  seizure      Tramadol Other (See Comments)     Other reaction(s): Seizure  SEIZURES HALLUCINATION         Outpatient Medications Marked as Taking for the 11/2/22 encounter (Office Visit) with Mary Leon MD   Medication Sig Dispense Refill    albuterol (PROVENTIL/VENTOLIN HFA) 90 mcg/actuation inhaler Inhale into the lungs.      cholestyramine (QUESTRAN) 4 gram packet Take by mouth.      clonazePAM (KLONOPIN) 1 MG tablet Take 0.5 tablets (0.5 mg total) by mouth nightly. 15 tablet 3    fexofenadine (ALLEGRA) 60 MG tablet Take 60 mg by mouth.      fluticasone propionate (FLONASE) 50 mcg/actuation nasal spray 1 spray by Nasal route.      hydrOXYzine pamoate (VISTARIL) 25 MG Cap   See Instructions, TAKE 2 CAPSULES BY MOUTH TWICE A DAY, # 360 cap(s), 1 Refill(s), Pharmacy: [facility] STORE [id number], 167, cm, Height/Length Dosing, 03/21/22 10:39:00 CDT, 123.45, kg, Weight Dosing, 03/21/22 10:39:00 CDT      lacosamide (VIMPAT) 150 mg Tab tablet Take 1 tablet (150 mg total) by mouth 2 (two) times a day. 1 tab(s) (150 mg), BID 60 tablet 3    latanoprost 0.005 % ophthalmic solution Place 1 drop into both eyes nightly.      lisinopriL (PRINIVIL,ZESTRIL) 40 MG tablet   See Instructions, TAKE ONE TABLET BY MOUTH EVERY DAY, # 90 tab(s), 3 Refill(s), Pharmacy: [facility]/pharmacy #[id number], 167, cm, Height/Length Dosing, 03/21/22 10:39:00 CDT, 123.45, kg, Weight Dosing, 03/21/22 10:39:00 CDT       OPTICHAMBER ADULT MASK-LARGE Eli USE AS DIRECTED WITH SYMBICORT      SYMBICORT 160-4.5 mcg/actuation HFAA Inhale into the lungs.      topiramate (TOPAMAX) 100 MG tablet TAKE 1 TABLET BY MOUTH TWICE A  tablet 1    venlafaxine (EFFEXOR-XR) 37.5 MG 24 hr capsule Take 1 capsule (37.5 mg total) by mouth once daily. To take with 75mg once daily 30 capsule 11    venlafaxine (EFFEXOR-XR) 75 MG 24 hr capsule TAKE 1 CAPSULE BY MOUTH EVERY DAY 90 capsule 2    [DISCONTINUED] ondansetron (ZOFRAN-ODT) 4 MG TbDL Take 4 mg by mouth every 8 (eight) hours as needed.         Social History     Socioeconomic History    Marital status:    Occupational History    Occupation: unemployed   Tobacco Use    Smoking status: Never    Smokeless tobacco: Never   Substance and Sexual Activity    Alcohol use: Not Currently    Drug use: Never        Family History   Problem Relation Age of Onset    Hypertension Mother     Stroke Mother     Hypertension Father     Stroke Father     Hyperlipidemia Sister     Kidney disease Sister         Patient Care Team:  Mary Leon MD as PCP - General (Family Medicine)  Yamile Mortensen MD (Pulmonary Disease)  Cornelio GILL MD Rosaline as Consulting Physician (Gastroenterology)  Indira Worley MD as Consulting Physician (Neurology)  Tahir Ahmadi MD as Consulting Physician (Oncology)  Mitch Flor MD as Consulting Physician (Urology)       Subjective:     Review of Systems   Constitutional: Negative.    HENT: Negative.     Eyes: Negative.    Respiratory:  Positive for shortness of breath.    Cardiovascular: Negative.    Gastrointestinal: Negative.    Genitourinary: Negative.    Musculoskeletal: Negative.    Skin: Negative.    Neurological:  Positive for seizures.   Endo/Heme/Allergies: Negative.    Psychiatric/Behavioral: Negative.         Patient Reported Health Risk Assessment  What is your age?:  (62)  Are you male or female?: Female  During the past four weeks, how much have you  been bothered by emotional problems such as feeling anxious, depressed, irritable, sad, or downhearted and blue?: Not at all  During the past five weeks, has your physical and/or emotional health limited your social activities with family, friends, neighbors, or groups?: Not at all  During the past four weeks, how much bodily pain have you generally had?: Severe pain  During the past four weeks, was someone available to help if you needed and wanted help?: Yes, as much as I wanted  During the past four weeks, what was the hardest physical activity you could do for at least two minutes?: Light  Can you get to places out of walking distance without help?  (For example, can you travel alone on buses or taxis, or drive your own car?): Yes  Can you go shopping for groceries or clothes without someone's help?: No  Can you prepare your own meals?: No  Can you do your own housework without help?: No  Because of any health problems, do you need the help of another person with your personal care needs such as eating, bathing, dressing, or getting around the house?: No  Can you handle your own money without help?: No  During the past four weeks, how would you rate your health in general?: Fair  How have things been going for you during the past four weeks?: Good and bad parts about equal  Are you having difficulties driving your car?: No  Do you always fasten your seat belt when you are in a car?: Yes, usually  How often in the past four weeks have you been bothered by falling or dizzy when standing up?: Never  How often in the past four weeks have you been bothered by sexual problems?: Never  How often in the past four weeks have you been bothered by trouble eating well?: Never  How often in the past four weeks have you been bothered by teeth or denture problems?: Never  How often in the past four weeks have you been bothered with problems using the telephone?: Never  How often in the past four weeks have you been bothered by  "tiredness or fatigue?: Never  Have you fallen two or more times in the past year?: No  Are you afraid of falling?: No  Are you a smoker?: No  During the past four weeks, how many drinks of wine, beer, or other alcoholic beverages did you have?: No alcohol at all  Do you exercise for about 20 minutes three or more days a week?: No, I usually do not exercise this much  Have you been given any information to help you with hazards in your house that might hurt you?: No  Have you been given any information to help you with keeping track of your medications?: No  How often do you have trouble taking medicines the way you've been told to take them?: I always take them as prescribed  How confident are you that you can control and manage most of your health problems?: Somewhat confident  What is your race? (Check all that apply.):     Objective:     /82 (BP Location: Left arm)   Pulse 83   Temp 98.3 °F (36.8 °C)   Resp 16   Ht 5' 5" (1.651 m)   Wt 121.3 kg (267 lb 8 oz)   SpO2 98%   BMI 44.51 kg/m²     Physical Exam  Vitals and nursing note reviewed.   Constitutional:       Appearance: Normal appearance. She is obese.   HENT:      Head: Normocephalic and atraumatic.      Nose: Nose normal.      Mouth/Throat:      Mouth: Mucous membranes are moist.      Pharynx: Oropharynx is clear.   Eyes:      Extraocular Movements: Extraocular movements intact.   Cardiovascular:      Rate and Rhythm: Normal rate and regular rhythm.      Pulses: Normal pulses.      Heart sounds: Normal heart sounds.   Pulmonary:      Effort: Pulmonary effort is normal.      Breath sounds: Normal breath sounds.   Musculoskeletal:         General: Normal range of motion.      Cervical back: Normal range of motion.   Skin:     General: Skin is warm and dry.   Neurological:      General: No focal deficit present.      Mental Status: She is alert and oriented to person, place, and time. Mental status is at baseline.   Psychiatric:    "      Mood and Affect: Mood normal.         No flowsheet data found.  Fall Risk Assessment - Outpatient 11/2/2022 8/18/2022 7/14/2022 7/5/2022   Mobility Status Ambulatory Ambulatory Ambulatory Ambulatory   Number of falls 0 0 0 0   Identified as fall risk 0 0 0 0           Depression Screening  Over the past two weeks, has the patient felt down, depressed, or hopeless?: No  Over the past two weeks, has the patient felt little interest or pleasure in doing things?: No  Functional Ability/Safety Screening  Was the patient's timed Up & Go test unsteady or longer than 30 seconds?: No  Does the patient need help with phone, transportation, shopping, preparing meals, housework, laundry, meds, or managing money?: No  Does the patient's home have rugs in the hallway, lack grab bars in the bathroom, lack handrails on the stairs or have poor lighting?: No  Have you noticed any hearing difficulties?: No  Cognitive Function (Assessed through direct observation with due consideration of information obtained by way of patient reports and/or concerns raised by family, friends, caretakers, or others)    Does the patient repeat questions/statements in the same day?: No  Does the patient have trouble remembering the date, year, and time?: No  Does the patient have difficulty managing finances?: No  Does the patient have a decreased sense of direction?: No  Assessment/Plan:       Medicare Annual Wellness and Personalized Prevention Plan:   Fall Risk + Home Safety + Hearing Impairment + Depression Screen + Cognitive Impairment Screen + Health Risk Assessment all reviewed.     Opioid Screening: Patient medication list reviewed, patient is not taking prescription opioids. Patient is not using additional opioids than prescribed. Patient is at low risk of substance abuse based on this opioid use history.         Health Maintenance Topics with due status: Not Due       Topic Last Completion Date    Colorectal Cancer Screening 02/01/2019     "TETANUS VACCINE 09/09/2020    Lipid Panel 09/13/2022    Mammogram 10/21/2022      The patient's Health Maintenance was reviewed and the following appears to be due at this time:   Health Maintenance Due   Topic Date Due    Pneumococcal Vaccines (Age 0-64) (1 - PCV) Never done    HIV Screening  Never done    COVID-19 Vaccine (4 - Booster for Kristi series) 10/01/2022         1. Medicare annual wellness visit, subsequent  Assessment & Plan:  Previously done labs reviewed with patient at time of appointment today  Mammogram at [facility] 10/2022. Need to request record  DEXA  Or[facility]  [facility]  Pap smear  5/2019  Colonoscopy with Dr. Cornelio yepez. Need to request  Declines immunization today    Advanced care planning discussed and paperwork given        2. Advanced care planning/counseling discussion  Assessment & Plan:  Advanced care planning discussed and paperwork given.    I attest that I have had a face to face discussion with patient and or surrogate decision maker.   Included surrogate decision maker: NO  Advanced directive in chart: NO  LAPOST: NO    Total time spent: 16 minutes        3. Nonintractable epilepsy without status epilepticus, unspecified epilepsy type  Overview:  Has had seizures since the age of 13. Initial seizure was in the setting of acute illness with hydronephrosis and continued to have seizures frequently. Used to stare and throw dolls at her sister and did not know they were seizures.  SHe used to have 30-40 seizures a month. Currently seizure frequency is about 1 every 6 months. She is on vimpat 200 BID, topamax 200 BID, gabapentin 300 BID. She also takes clorazepate, clonazepam 1 BID. She has 2 types of seizures. First type is characterized by starting with "lord sola help me" and she makes sign of cross, then stares, hits her leg, falls to ground if standing lasting for 1-2 minutes. has postictal confusion for 5 minutes to 15 minutes. Has never had a generalized tonic clonic " seizure witnessed. Second type is nocturnal. She has had a seizure in her sleep and woke up with tongue bitten. Overall she is very happy with her seizure frequency currently as it is much improved. She does complain of short-term memory problems, which she attributes to the medications.      Assessment & Plan:  Stable on current meds. Seizure activity improved once anxiety symptoms improved per patient. Weaned herself off gabapentin since 10/15/22.  Keep scheduled appts with neuro      4. Anxiety  Assessment & Plan:  Stable on effexor      5. Depressive disorder  Assessment & Plan:  Stable on effexor      6. Chronic obstructive pulmonary disease, unspecified COPD type  Assessment & Plan:  Keep appts with pulm.  Reports compliance with her inhalers      7. On home oxygen therapy  Assessment & Plan:  Does not have on today. Keep appts with pulm      8. Hypertension, unspecified type  Assessment & Plan:  Well controlled on current prescription meds.       9. Overactive bladder  Assessment & Plan:  On  Myrbetriq. Keep appts with urology      10. Breast cancer screening by mammogram  Assessment & Plan:  Done at [facility] 10/2022. Do not have results. Need to request      11. Cancer of splenic flexure  Assessment & Plan:  Keep appts with GI and oncology      12. Family history of diabetes mellitus  Assessment & Plan:  Lab Results   Component Value Date    HGBA1C 5.5 09/13/2022     Encourage diet/lifestyle change      13. Class 3 severe obesity with serious comorbidity and body mass index (BMI) of 45.0 to 49.9 in adult, unspecified obesity type  Assessment & Plan:  Encourage lifestyle change      14. Gastroesophageal reflux disease, unspecified whether esophagitis present  Assessment & Plan:  Stable on ppi and pepcid prescriptions. Keep appts with GI      15. KHANH on CPAP  Assessment & Plan:  Reports compliance and benefits from continued use          16. Seasonal allergies  Assessment & Plan:  On allegra        17.  Postmenopausal  Assessment & Plan:  dexa ordered    Orders:  -     DXA Bone Density Spine And Hip; Future; Expected date: 11/02/2022    18. Glaucoma, unspecified glaucoma type, unspecified laterality  Assessment & Plan:  On drops. Keep appts with ophtho      Other orders  -     ondansetron (ZOFRAN-ODT) 4 MG TbDL; Take 1 tablet (4 mg total) by mouth every 8 (eight) hours as needed (nausea).  Dispense: 15 tablet; Refill: 0       Advance Care Planning   I attest to discussing Advance Care Planning with patient and/or family member.  Education was provided including the importance of the Health Care Power of , Advance Directives, and/or [place] documentation.  The patient expressed understanding to the importance of this information and discussion.  Length of ACP conversation in minutes: 16         Medication List with Changes/Refills   Current Medications    ALBUTEROL (PROVENTIL/VENTOLIN HFA) 90 MCG/ACTUATION INHALER    Inhale into the lungs.       Start Date: 8/16/2022 End Date: --    BENZONATATE (TESSALON) 200 MG CAPSULE    Take 200 mg by mouth 3 (three) times daily as needed.       Start Date: 8/16/2022 End Date: --    CHOLESTYRAMINE (QUESTRAN) 4 GRAM PACKET    Take by mouth.       Start Date: 10/26/2022End Date: --    CLENPIQ 10 MG-3.5 GRAM -12 GRAM/160 ML SOLN    Take by mouth.       Start Date: 9/12/2022 End Date: --    CLONAZEPAM (KLONOPIN) 1 MG TABLET    Take 0.5 tablets (0.5 mg total) by mouth nightly.       Start Date: 10/7/2022 End Date: --    CLORAZEPATE (TRANXENE) 7.5 MG TAB    Take 7.5 mg by mouth.       Start Date: --        End Date: --    ESOMEPRAZOLE (NEXIUM) 40 MG CAPSULE    Take 40 mg by mouth once daily.       Start Date: 9/1/2022  End Date: --    FAMOTIDINE (PEPCID) 40 MG TABLET    Take 40 mg by mouth once daily.       Start Date: 7/11/2022 End Date: --    FEXOFENADINE (ALLEGRA) 60 MG TABLET    Take 60 mg by mouth.       Start Date: 8/25/2022 End Date: 2/21/2023    FLUTICASONE PROPIONATE  (FLONASE) 50 MCG/ACTUATION NASAL SPRAY    1 spray by Nasal route.       Start Date: 8/25/2022 End Date: 8/25/2023    GARLIC 1,000 MG CAP    Take by mouth.       Start Date: --        End Date: --    HYDROXYZINE PAMOATE (VISTARIL) 25 MG CAP      See Instructions, TAKE 2 CAPSULES BY MOUTH TWICE A DAY, # 360 cap(s), 1 Refill(s), Pharmacy: [facility] STORE [id number], 167, cm, Height/Length Dosing, 03/21/22 10:39:00 CDT, 123.45, kg, Weight Dosing, 03/21/22 10:39:00 CDT       Start Date: 4/20/2022 End Date: --    LACOSAMIDE (VIMPAT) 150 MG TAB TABLET    Take 1 tablet (150 mg total) by mouth 2 (two) times a day. 1 tab(s) (150 mg), BID       Start Date: 9/22/2022 End Date: --    LATANOPROST 0.005 % OPHTHALMIC SOLUTION    Place 1 drop into both eyes nightly.       Start Date: 5/5/2022  End Date: --    LISINOPRIL (PRINIVIL,ZESTRIL) 40 MG TABLET      See Instructions, TAKE ONE TABLET BY MOUTH EVERY DAY, # 90 tab(s), 3 Refill(s), Pharmacy: [facility]/pharmacy #[id number], 167, cm, Height/Length Dosing, 03/21/22 10:39:00 CDT, 123.45, kg, Weight Dosing, 03/21/22 10:39:00 CDT       Start Date: 11/15/2021End Date: --    MIRABEGRON (MYRBETRIQ) 50 MG TB24    Take 50 mg by mouth.       Start Date: --        End Date: --    OPTICHAMBER ADULT MASK-LARGE TEETEE    USE AS DIRECTED WITH SYMBICORT       Start Date: 9/20/2022 End Date: --    PANTOPRAZOLE (PROTONIX) 40 MG TABLET    Take 40 mg by mouth once daily.       Start Date: 7/18/2022 End Date: --    SYMBICORT 160-4.5 MCG/ACTUATION HFAA    Inhale into the lungs.       Start Date: 9/21/2022 End Date: --    TOPIRAMATE (TOPAMAX) 100 MG TABLET    TAKE 1 TABLET BY MOUTH TWICE A DAY       Start Date: 6/6/2022  End Date: --    VENLAFAXINE (EFFEXOR-XR) 37.5 MG 24 HR CAPSULE    Take 1 capsule (37.5 mg total) by mouth once daily. To take with 75mg once daily       Start Date: 10/18/2022End Date: 10/18/2023    VENLAFAXINE (EFFEXOR-XR) 75 MG 24 HR CAPSULE    TAKE 1 CAPSULE BY MOUTH EVERY DAY       Start Date: 5/19/2022 End  Date: --   Changed and/or Refilled Medications    Modified Medication Previous Medication    ONDANSETRON (ZOFRAN-ODT) 4 MG TBDL ondansetron (ZOFRAN-ODT) 4 MG TbDL       Take 1 tablet (4 mg total) by mouth every 8 (eight) hours as needed (nausea).    Take 4 mg by mouth every 8 (eight) hours as needed.       Start Date: 11/2/2022 End Date: --    Start Date: --        End Date: 11/2/2022   Discontinued Medications    GABAPENTIN (NEURONTIN) 100 MG CAPSULE    Take 2 capsules (200 mg total) by mouth once daily.       Start Date: 7/11/2022 End Date: 11/2/2022    PROCHLORPERAZINE (COMPAZINE) 5 MG TABLET    Take 10 mg by mouth every 8 (eight) hours as needed.       Start Date: 7/11/2022 End Date: 11/2/2022        No follow-ups on file. In addition to their scheduled follow up, the patient has also been instructed to follow up on as needed basis.

## 2022-11-02 NOTE — ASSESSMENT & PLAN NOTE
The patient was evaluated last week. He was informed to go to ER for IV antibiotics. He went to the ER but they did not infuse IV antibiotics. They changed his oral antibiotic. Please advise regarding an appt or further recommendations?   dexa ordered

## 2022-11-03 NOTE — ASSESSMENT & PLAN NOTE
Previously done labs reviewed with patient at time of appointment today  Mammogram at [facility] breast [place] 10/2022. Need to request record  DEXA  Ordered for  [facility]  Pap smear  5/2019  Colonoscopy with Dr. Cornelio yepez. Need to request  Declines immunization today    Advanced care planning discussed and paperwork given

## 2022-11-03 NOTE — ASSESSMENT & PLAN NOTE
Stable on current meds. Seizure activity improved once anxiety symptoms improved per patient. Weaned herself off gabapentin since 10/15/22.  Keep scheduled appts with neuro

## 2022-12-06 ENCOUNTER — HOSPITAL ENCOUNTER (OUTPATIENT)
Dept: RADIOLOGY | Facility: HOSPITAL | Age: 62
Discharge: HOME | End: 2022-12-06
Attending: FAMILY MEDICINE
Payer: MEDICARE

## 2022-12-06 DIAGNOSIS — Z78.0 POSTMENOPAUSAL: ICD-10-CM

## 2022-12-06 PROCEDURE — 77080 DXA BONE DENSITY AXIAL: CPT | Mod: 26,,, | Performed by: RADIOLOGY

## 2022-12-06 PROCEDURE — 77080 DEXA BONE DENSITY SPINE HIP: ICD-10-PCS | Mod: 26,,, | Performed by: RADIOLOGY

## 2022-12-06 PROCEDURE — 77080 DXA BONE DENSITY AXIAL: CPT | Mod: TC

## 2022-12-07 ENCOUNTER — OFFICE VISIT (OUTPATIENT)
Dept: FAMILY MEDICINE | Facility: CLINIC | Age: 62
End: 2022-12-07
Payer: MEDICARE

## 2022-12-07 VITALS
DIASTOLIC BLOOD PRESSURE: 84 MMHG | WEIGHT: 264 LBS | OXYGEN SATURATION: 97 % | HEART RATE: 77 BPM | RESPIRATION RATE: 18 BRPM | HEIGHT: 65 IN | TEMPERATURE: 99 F | BODY MASS INDEX: 43.99 KG/M2 | SYSTOLIC BLOOD PRESSURE: 138 MMHG

## 2022-12-07 DIAGNOSIS — H91.92 DECREASED HEARING OF LEFT EAR: Primary | ICD-10-CM

## 2022-12-07 DIAGNOSIS — M25.561 ACUTE PAIN OF BOTH KNEES: ICD-10-CM

## 2022-12-07 DIAGNOSIS — M25.562 ACUTE PAIN OF BOTH KNEES: ICD-10-CM

## 2022-12-07 PROCEDURE — 3008F BODY MASS INDEX DOCD: CPT | Mod: CPTII,,, | Performed by: NURSE PRACTITIONER

## 2022-12-07 PROCEDURE — 3008F PR BODY MASS INDEX (BMI) DOCUMENTED: ICD-10-PCS | Mod: CPTII,,, | Performed by: NURSE PRACTITIONER

## 2022-12-07 PROCEDURE — 99213 PR OFFICE/OUTPT VISIT, EST, LEVL III, 20-29 MIN: ICD-10-PCS | Mod: ,,, | Performed by: NURSE PRACTITIONER

## 2022-12-07 PROCEDURE — 4010F PR ACE/ARB THEARPY RXD/TAKEN: ICD-10-PCS | Mod: CPTII,,, | Performed by: NURSE PRACTITIONER

## 2022-12-07 PROCEDURE — 3075F PR MOST RECENT SYSTOLIC BLOOD PRESS GE 130-139MM HG: ICD-10-PCS | Mod: CPTII,,, | Performed by: NURSE PRACTITIONER

## 2022-12-07 PROCEDURE — 3079F PR MOST RECENT DIASTOLIC BLOOD PRESSURE 80-89 MM HG: ICD-10-PCS | Mod: CPTII,,, | Performed by: NURSE PRACTITIONER

## 2022-12-07 PROCEDURE — 3079F DIAST BP 80-89 MM HG: CPT | Mod: CPTII,,, | Performed by: NURSE PRACTITIONER

## 2022-12-07 PROCEDURE — 4010F ACE/ARB THERAPY RXD/TAKEN: CPT | Mod: CPTII,,, | Performed by: NURSE PRACTITIONER

## 2022-12-07 PROCEDURE — 99213 OFFICE O/P EST LOW 20 MIN: CPT | Mod: ,,, | Performed by: NURSE PRACTITIONER

## 2022-12-07 PROCEDURE — 1159F PR MEDICATION LIST DOCUMENTED IN MEDICAL RECORD: ICD-10-PCS | Mod: CPTII,,, | Performed by: NURSE PRACTITIONER

## 2022-12-07 PROCEDURE — 1159F MED LIST DOCD IN RCRD: CPT | Mod: CPTII,,, | Performed by: NURSE PRACTITIONER

## 2022-12-07 PROCEDURE — 3075F SYST BP GE 130 - 139MM HG: CPT | Mod: CPTII,,, | Performed by: NURSE PRACTITIONER

## 2022-12-07 NOTE — PROGRESS NOTES
DEXA Results: Please inform patient of normal DEXA, indicating normal bone density (strong bones). Repeat in 3 years. To keep bones strong, continue Calcium 600mg + Vitamin D 800units BID plus weight bearing exercise at least 3 days per week.

## 2022-12-07 NOTE — PROGRESS NOTES
Subjective:      Patient ID: Christie Marcum is a 62 y.o. Black or  female      Chief Complaint: Otitis Media       Past Medical History:   Diagnosis Date    Anxiety     Arthritis     Cancer of splenic flexure     COPD (chronic obstructive pulmonary disease)     Hypertension     KHANH on CPAP     Oxygen dependent         HPI  C/O of decrease hearing of left ear   Treated for Otitis Media per [facility] with Z-jonathan,   Denies any ear pain, no tinnitus or drainage    C/O of bilateral knee pain (R >L)   Unable to sleep due to pain  Ibuprofen with some relief  Denies XR     Otitis Media: No dizziness, no vertigo, no chills, no ear pain, no fever and no shortness of breath.    Review of Systems   Constitutional: Negative.  Negative for appetite change, chills and fever.   HENT:  Positive for hearing loss. Negative for ear discharge and ear pain.    Eyes: Negative.  Negative for discharge and redness.   Respiratory: Negative.  Negative for shortness of breath.    Cardiovascular: Negative.  Negative for chest pain.   Gastrointestinal: Negative.    Endocrine: Negative.    Genitourinary: Negative.    Musculoskeletal:         Bilateral knee pain   Integumentary:  Negative.   Allergic/Immunologic: Negative.    Neurological: Negative.  Negative for dizziness and vertigo.   Psychiatric/Behavioral: Negative.     All other systems reviewed and are negative.       Objective:      Vitals:    12/07/22 1429   BP: 138/84   Pulse: 77   Resp: 18   Temp: 98.6 °F (37 °C)      Body mass index is 43.93 kg/m².     Physical Exam  Vitals reviewed.   Constitutional:       Appearance: Normal appearance.   HENT:      Head: Normocephalic.      Right Ear: Tympanic membrane and ear canal normal. No drainage. Tympanic membrane is not perforated, erythematous or bulging.      Left Ear: Tympanic membrane and ear canal normal. No drainage. Tympanic membrane is not perforated, erythematous or bulging.      Mouth/Throat:      Mouth: Mucous membranes  are moist.   Eyes:      Conjunctiva/sclera: Conjunctivae normal.      Pupils: Pupils are equal, round, and reactive to light.   Cardiovascular:      Rate and Rhythm: Normal rate and regular rhythm.   Pulmonary:      Effort: Pulmonary effort is normal. No respiratory distress.      Breath sounds: Normal breath sounds.   Musculoskeletal:         General: Normal range of motion.      Cervical back: Normal range of motion and neck supple.      Right knee: Crepitus present. Normal range of motion.      Left knee: Crepitus present. Normal range of motion.   Skin:     General: Skin is warm and dry.   Neurological:      Mental Status: She is alert and oriented to person, place, and time.   Psychiatric:         Mood and Affect: Mood normal.          Current Outpatient Medications:     albuterol (PROVENTIL/VENTOLIN HFA) 90 mcg/actuation inhaler, Inhale into the lungs., Disp: , Rfl:     benzonatate (TESSALON) 200 MG capsule, Take 200 mg by mouth 3 (three) times daily as needed., Disp: , Rfl:     cholestyramine (QUESTRAN) 4 gram packet, Take by mouth., Disp: , Rfl:     CLENPIQ 10 mg-3.5 gram -12 gram/160 mL Soln, Take by mouth., Disp: , Rfl:     clonazePAM (KLONOPIN) 1 MG tablet, Take 0.5 tablets (0.5 mg total) by mouth nightly., Disp: 15 tablet, Rfl: 3    clorazepate (TRANXENE) 7.5 MG Tab, Take 7.5 mg by mouth., Disp: , Rfl:     esomeprazole (NEXIUM) 40 MG capsule, Take 40 mg by mouth once daily., Disp: , Rfl:     famotidine (PEPCID) 40 MG tablet, Take 40 mg by mouth once daily., Disp: , Rfl:     fexofenadine (ALLEGRA) 60 MG tablet, Take 60 mg by mouth., Disp: , Rfl:     fluticasone propionate (FLONASE) 50 mcg/actuation nasal spray, 1 spray by Nasal route., Disp: , Rfl:     garlic 1,000 mg Cap, Take by mouth., Disp: , Rfl:     hydrOXYzine pamoate (VISTARIL) 25 MG Cap,  See Instructions, TAKE 2 CAPSULES BY MOUTH TWICE A DAY, # 360 cap(s), 1 Refill(s), Pharmacy: [facility] STORE [id number], 167, cm, Height/Length Dosing, 03/21/22 10:39:00  CDT, 123.45, kg, Weight Dosing, 03/21/22 10:39:00 CDT, Disp: , Rfl:     lacosamide (VIMPAT) 150 mg Tab tablet, Take 1 tablet (150 mg total) by mouth 2 (two) times a day. 1 tab(s) (150 mg), BID, Disp: 60 tablet, Rfl: 3    latanoprost 0.005 % ophthalmic solution, Place 1 drop into both eyes nightly., Disp: , Rfl:     lisinopriL (PRINIVIL,ZESTRIL) 40 MG tablet,  See Instructions, TAKE ONE TABLET BY MOUTH EVERY DAY, # 90 tab(s), 3 Refill(s), Pharmacy: [facility]/pharmacy #[id number], 167, cm, Height/Length Dosing, 03/21/22 10:39:00 CDT, 123.45, kg, Weight Dosing, 03/21/22 10:39:00 CDT, Disp: , Rfl:     mirabegron (MYRBETRIQ) 50 mg Tb24, Take 50 mg by mouth., Disp: , Rfl:     ondansetron (ZOFRAN-ODT) 4 MG TbDL, Take 1 tablet (4 mg total) by mouth every 8 (eight) hours as needed (nausea)., Disp: 15 tablet, Rfl: 0    OPTICHAMBER ADULT MASK-LARGE Eli, USE AS DIRECTED WITH SYMBICORT, Disp: , Rfl:     pantoprazole (PROTONIX) 40 MG tablet, Take 40 mg by mouth once daily., Disp: , Rfl:     SYMBICORT 160-4.5 mcg/actuation HFAA, Inhale into the lungs., Disp: , Rfl:     topiramate (TOPAMAX) 100 MG tablet, TAKE 1 TABLET BY MOUTH TWICE A DAY, Disp: 180 tablet, Rfl: 1    venlafaxine (EFFEXOR-XR) 75 MG 24 hr capsule, TAKE 1 CAPSULE BY MOUTH EVERY DAY, Disp: 90 capsule, Rfl: 2    venlafaxine (EFFEXOR-XR) 37.5 MG 24 hr capsule, Take 1 capsule (37.5 mg total) by mouth once daily. To take with 75mg once daily, Disp: 30 capsule, Rfl: 11    Assessment & Plan:     Problem List Items Addressed This Visit          ENT    Decreased hearing of left ear - Primary    Relevant Orders    Ambulatory referral/consult to ENT    Ambulatory referral/consult to Audiology       Orthopedic    Acute pain of both knees    Relevant Orders    Ambulatory referral/consult to Orthopedics          Prior to the patient's arrival on the same day, I spent (10) minutes reviewing chart. Once in the exam room with the patient, I spent (6) minutes in the room with the member  performing a history and exam as well as reviewing the test results and recommendations with the patient. After leaving the exam room, I spent an additional (4) minutes completing the electronic health record. The total time spent that day caring for the member is (20) minutes, and this time - including the breakdown - is documented in the medical record.

## 2022-12-15 ENCOUNTER — DOCUMENTATION ONLY (OUTPATIENT)
Dept: ADMINISTRATIVE | Facility: HOSPITAL | Age: 62
End: 2022-12-15
Payer: MEDICARE

## 2022-12-20 DIAGNOSIS — F41.9 ANXIETY: Primary | ICD-10-CM

## 2022-12-20 RX ORDER — VENLAFAXINE HYDROCHLORIDE 75 MG/1
75 CAPSULE, EXTENDED RELEASE ORAL DAILY
Qty: 90 CAPSULE | Refills: 2 | Status: SHIPPED | OUTPATIENT
Start: 2022-12-20 | End: 2023-03-20

## 2022-12-20 RX ORDER — VENLAFAXINE HYDROCHLORIDE 75 MG/1
CAPSULE, EXTENDED RELEASE ORAL
Qty: 90 CAPSULE | Refills: 2 | OUTPATIENT
Start: 2022-12-20

## 2022-12-21 ENCOUNTER — HOSPITAL ENCOUNTER (OUTPATIENT)
Dept: RADIOLOGY | Facility: CLINIC | Age: 62
Discharge: HOME | End: 2022-12-21
Attending: ORTHOPAEDIC SURGERY
Payer: MEDICARE

## 2022-12-21 ENCOUNTER — OFFICE VISIT (OUTPATIENT)
Dept: ORTHOPEDICS | Facility: CLINIC | Age: 62
End: 2022-12-21
Payer: MEDICARE

## 2022-12-21 VITALS — WEIGHT: 264 LBS | BODY MASS INDEX: 43.99 KG/M2 | HEIGHT: 65 IN

## 2022-12-21 DIAGNOSIS — M25.561 ACUTE PAIN OF BOTH KNEES: ICD-10-CM

## 2022-12-21 DIAGNOSIS — M23.42 LOOSE BODY IN KNEE, LEFT KNEE: ICD-10-CM

## 2022-12-21 DIAGNOSIS — M25.562 ACUTE PAIN OF BOTH KNEES: ICD-10-CM

## 2022-12-21 DIAGNOSIS — M17.11 PRIMARY OSTEOARTHRITIS OF RIGHT KNEE: Primary | ICD-10-CM

## 2022-12-21 PROCEDURE — 4010F PR ACE/ARB THEARPY RXD/TAKEN: ICD-10-PCS | Mod: CPTII,,, | Performed by: ORTHOPAEDIC SURGERY

## 2022-12-21 PROCEDURE — 3008F PR BODY MASS INDEX (BMI) DOCUMENTED: ICD-10-PCS | Mod: CPTII,,, | Performed by: ORTHOPAEDIC SURGERY

## 2022-12-21 PROCEDURE — 4010F ACE/ARB THERAPY RXD/TAKEN: CPT | Mod: CPTII,,, | Performed by: ORTHOPAEDIC SURGERY

## 2022-12-21 PROCEDURE — 73564 X-RAY EXAM KNEE 4 OR MORE: CPT | Mod: 50,,, | Performed by: ORTHOPAEDIC SURGERY

## 2022-12-21 PROCEDURE — 99203 OFFICE O/P NEW LOW 30 MIN: CPT | Mod: ,,, | Performed by: ORTHOPAEDIC SURGERY

## 2022-12-21 PROCEDURE — 1159F MED LIST DOCD IN RCRD: CPT | Mod: CPTII,,, | Performed by: ORTHOPAEDIC SURGERY

## 2022-12-21 PROCEDURE — 3008F BODY MASS INDEX DOCD: CPT | Mod: CPTII,,, | Performed by: ORTHOPAEDIC SURGERY

## 2022-12-21 PROCEDURE — 1159F PR MEDICATION LIST DOCUMENTED IN MEDICAL RECORD: ICD-10-PCS | Mod: CPTII,,, | Performed by: ORTHOPAEDIC SURGERY

## 2022-12-21 PROCEDURE — 99203 PR OFFICE/OUTPT VISIT, NEW, LEVL III, 30-44 MIN: ICD-10-PCS | Mod: ,,, | Performed by: ORTHOPAEDIC SURGERY

## 2022-12-21 PROCEDURE — 73564 XR KNEE COMP 4 OR MORE VIEWS BILAT: ICD-10-PCS | Mod: 50,,, | Performed by: ORTHOPAEDIC SURGERY

## 2022-12-21 RX ORDER — MELOXICAM 15 MG/1
15 TABLET ORAL DAILY
Qty: 14 TABLET | Refills: 0 | Status: SHIPPED | OUTPATIENT
Start: 2022-12-21 | End: 2023-09-20 | Stop reason: ALTCHOICE

## 2022-12-21 NOTE — PROGRESS NOTES
Chief Complaint:   Chief Complaint   Patient presents with    Right Knee - Pain    Left Knee - Pain    Knee Pain     TRUDY knee pain, has persisted for 2mo, stabbing/throbbing/pain, R knee is worse, using voltaren gel, previous injury in 2010, had seizure and fell       Consulting Physician: Cecily Humphrey FNP    History of present illness:    she  is a pleasant 62 y.o. year old female with bilateral knee pain, right worse than left. She reports a remote history of falling during a seizure and has had knee and back pain since. Her pain is global in both of her knees. It is worse with prolonged walking. It is better with rest. She also reports continued back pain due and was seeing Dr. Kate for this. She is not interested in steroid injections.     Past Medical History:   Diagnosis Date    Anxiety     Arthritis     Cancer of splenic flexure     COPD (chronic obstructive pulmonary disease)     Hypertension     KHANH on CPAP     Oxygen dependent        Past Surgical History:   Procedure Laterality Date    COLON SURGERY      COLONOSCOPY      COMPLEX CYSTOMETROGRAM      EMG      HYSTERECTOMY      Left breas biopsy      Right breast biopsy         Current Outpatient Medications   Medication Sig    albuterol (PROVENTIL/VENTOLIN HFA) 90 mcg/actuation inhaler Inhale into the lungs.    benzonatate (TESSALON) 200 MG capsule Take 200 mg by mouth 3 (three) times daily as needed.    cholestyramine (QUESTRAN) 4 gram packet Take by mouth.    CLENPIQ 10 mg-3.5 gram -12 gram/160 mL Soln Take by mouth.    clonazePAM (KLONOPIN) 1 MG tablet Take 0.5 tablets (0.5 mg total) by mouth nightly.    clorazepate (TRANXENE) 7.5 MG Tab Take 7.5 mg by mouth.    fexofenadine (ALLEGRA) 60 MG tablet Take 60 mg by mouth.    fluticasone propionate (FLONASE) 50 mcg/actuation nasal spray 1 spray by Nasal route.    hydrOXYzine pamoate (VISTARIL) 25 MG Cap   See Instructions, TAKE 2 CAPSULES BY MOUTH TWICE A DAY, # 360 cap(s), 1 Refill(s), Pharmacy:  [facility] STORE [id number], 167, cm, Height/Length Dosing, 03/21/22 10:39:00 CDT, 123.45, kg, Weight Dosing, 03/21/22 10:39:00 CDT    lacosamide (VIMPAT) 150 mg Tab tablet Take 1 tablet (150 mg total) by mouth 2 (two) times a day. 1 tab(s) (150 mg), BID    latanoprost 0.005 % ophthalmic solution Place 1 drop into both eyes nightly.    lisinopriL (PRINIVIL,ZESTRIL) 40 MG tablet   See Instructions, TAKE ONE TABLET BY MOUTH EVERY DAY, # 90 tab(s), 3 Refill(s), Pharmacy: [facility]/pharmacy #[id number], 167, cm, Height/Length Dosing, 03/21/22 10:39:00 CDT, 123.45, kg, Weight Dosing, 03/21/22 10:39:00 CDT    ondansetron (ZOFRAN-ODT) 4 MG TbDL Take 1 tablet (4 mg total) by mouth every 8 (eight) hours as needed (nausea).    OPTICHAMBER ADULT MASK-LARGE Eli USE AS DIRECTED WITH SYMBICORT    SYMBICORT 160-4.5 mcg/actuation HFAA Inhale into the lungs.    topiramate (TOPAMAX) 100 MG tablet TAKE 1 TABLET BY MOUTH TWICE A DAY    venlafaxine (EFFEXOR-XR) 37.5 MG 24 hr capsule Take 1 capsule (37.5 mg total) by mouth once daily. To take with 75mg once daily    venlafaxine (EFFEXOR-XR) 75 MG 24 hr capsule Take 1 capsule (75 mg total) by mouth once daily.    esomeprazole (NEXIUM) 40 MG capsule Take 40 mg by mouth once daily.    famotidine (PEPCID) 40 MG tablet Take 40 mg by mouth once daily.    garlic 1,000 mg Cap Take by mouth.    meloxicam (MOBIC) 15 MG tablet Take 1 tablet (15 mg total) by mouth once daily.    mirabegron (MYRBETRIQ) 50 mg Tb24 Take 50 mg by mouth.    pantoprazole (PROTONIX) 40 MG tablet Take 40 mg by mouth once daily.     No current facility-administered medications for this visit.       Review of patient's allergies indicates:   Allergen Reactions    Cephalexin      Other reaction(s): Seizure  hallucinations      Ciprofloxacin      Other reaction(s): Seiurus    Codeine      Other reaction(s): Seizure  seizure      Metronidazole      Other reaction(s): Seizure    Naproxen      Other reaction(s): Seizure  Nausea/seizure      Tetracycline  "     Other reaction(s): Seizure  seizure      Tramadol Other (See Comments)     Other reaction(s): Seizure  SEIZURES HALLUCINATION         Family History   Problem Relation Age of Onset    Hypertension Mother     Stroke Mother     Hypertension Father     Stroke Father     Hyperlipidemia Sister     Kidney disease Sister        Social History     Socioeconomic History    Marital status:    Occupational History    Occupation: unemployed   Tobacco Use    Smoking status: Never    Smokeless tobacco: Never   Substance and Sexual Activity    Alcohol use: Not Currently    Drug use: Never       Review of Systems:    Constitution:   Denies chills, fever, and sweats.  HENT:   Denies headaches or blurry vision.  Cardiovascular:  Denies chest pain or irregular heart beat.  Respiratory:   Denies cough or shortness of breath.  Gastrointestinal:  Denies abdominal pain, nausea, or vomiting.  Musculoskeletal:   Denies muscle cramps.  Neurological:   Denies dizziness or focal weakness.  Psychiatric/Behavior: Normal mental status.  Hematology/Lymph:  Denies bleeding problem or easy bruising/bleeding.  Skin:    Denies rash or suspicious lesions.    Examination:    Vital Signs:    Vitals:    12/21/22 1420   Weight: 119.7 kg (264 lb)   Height: 5' 5" (1.651 m)   PainSc:   8       Body mass index is 43.93 kg/m².    Constitution:   Well-developed, well nourished patient in no acute distress.  Neurological:   Alert and oriented x 3 and cooperative to examination.     Psychiatric/Behavior: Normal mental status.  Respiratory:   No shortness of breath.  Eyes:    Extraoccular muscles intact  Skin:    No scars, rash or suspicious lesions.    MSK:   Standing exam  stance: normal alignment, no significant leg-length discrepancy  gait: antalgic limp    Knee examination  - General comments: unremarkable appearance    - Tenderness: global in bilateral knees    Knee                  RIGHT    LEFT  Skin:                  Intact      Intact  ROM:    "              0-130      0-130  Effusion:             Neg        Neg  MJL TTP:           Neg         Neg  LJL TTP:            Neg         Neg  [place]:           +              +  Pat crep:             Neg        Neg  Patella TTPs:     Neg         Neg  Patella grind:      Neg        Neg  Lachman:           Neg        Neg  Pivot shift:          Neg        Neg  Valgus stress:    Neg        Neg  Varus stress:      Neg        Neg  Posterior drawer: Neg       Neg    N-V intact intact  Hip: nml nml    Lower extremity edema:Negative       Imaging: X-rays ordered and images interpreted today personally by me of 4 views of her right knee show osteoarthritis. 3 views of her left knee show osteoarthritis and a loose body       Assessment: Primary osteoarthritis of right knee  -     X-Ray Knee Complete 4 Or More Views Bilat; Future; Expected date: 12/21/2022    Loose body in knee, left knee    Other orders  -     meloxicam (MOBIC) 15 MG tablet; Take 1 tablet (15 mg total) by mouth once daily.  Dispense: 14 tablet; Refill: 0        Plan:  We discussed treatment options. Patient would like to try antiinflammatories and formal therapy. She can follow up if she has any issues.     I saw and evaluated the patient with formulation of the assessment and plan.  Ms. Cordoba assisted in scribing and documentation.

## 2023-01-25 RX ORDER — HYDROXYZINE PAMOATE 25 MG/1
50 CAPSULE ORAL 2 TIMES DAILY
Qty: 360 CAPSULE | Refills: 3 | Status: SHIPPED | OUTPATIENT
Start: 2023-01-25 | End: 2023-06-05

## 2023-01-25 NOTE — TELEPHONE ENCOUNTER
----- Message from Grazyna Chung sent at 1/25/2023 10:27 AM CST -----  Regarding: med refill  .Type:  RX Refill Request    Who Called: Pt  Refill or New Rx:Refill  RX Name and Strength:hydrOXYzine pamoate (VISTARIL) 25 MG Cap  How is the patient currently taking it? (ex. 1XDay):QID  Is this a 30 day or 90 day RX:  Preferred Pharmacy with phone number:[facility]/pharmacy #[id number] - [place], [place] - [address]  Local or Mail Order:Local  Ordering Provider:Carolyn  Would the patient rather a call back or a response via [facility]? Call back  Best Call Back Number:[phone]  Additional Information: [facility] sent over request on yesterday.

## 2023-02-06 PROBLEM — Z00.00 MEDICARE ANNUAL WELLNESS VISIT, SUBSEQUENT: Status: RESOLVED | Noted: 2022-07-05 | Resolved: 2023-02-06

## 2023-02-07 ENCOUNTER — OFFICE VISIT (OUTPATIENT)
Dept: NEUROLOGY | Facility: CLINIC | Age: 63
End: 2023-02-07
Payer: MEDICARE

## 2023-02-07 VITALS
HEART RATE: 90 BPM | WEIGHT: 265 LBS | HEIGHT: 65 IN | BODY MASS INDEX: 44.15 KG/M2 | DIASTOLIC BLOOD PRESSURE: 90 MMHG | SYSTOLIC BLOOD PRESSURE: 142 MMHG

## 2023-02-07 DIAGNOSIS — R41.0 EPISODIC CONFUSION: Primary | ICD-10-CM

## 2023-02-07 DIAGNOSIS — G40.909 NONINTRACTABLE EPILEPSY WITHOUT STATUS EPILEPTICUS, UNSPECIFIED EPILEPSY TYPE: ICD-10-CM

## 2023-02-07 DIAGNOSIS — G40.209 PARTIAL SYMPTOMATIC EPILEPSY WITH COMPLEX PARTIAL SEIZURES, NOT INTRACTABLE, WITHOUT STATUS EPILEPTICUS: ICD-10-CM

## 2023-02-07 PROCEDURE — 1159F MED LIST DOCD IN RCRD: CPT | Mod: CPTII,S$GLB,, | Performed by: NURSE PRACTITIONER

## 2023-02-07 PROCEDURE — 99999 PR PBB SHADOW E&M-EST. PATIENT-LVL IV: ICD-10-PCS | Mod: PBBFAC,,, | Performed by: NURSE PRACTITIONER

## 2023-02-07 PROCEDURE — 1159F PR MEDICATION LIST DOCUMENTED IN MEDICAL RECORD: ICD-10-PCS | Mod: CPTII,S$GLB,, | Performed by: NURSE PRACTITIONER

## 2023-02-07 PROCEDURE — 3077F SYST BP >= 140 MM HG: CPT | Mod: CPTII,S$GLB,, | Performed by: NURSE PRACTITIONER

## 2023-02-07 PROCEDURE — 3080F DIAST BP >= 90 MM HG: CPT | Mod: CPTII,S$GLB,, | Performed by: NURSE PRACTITIONER

## 2023-02-07 PROCEDURE — 3080F PR MOST RECENT DIASTOLIC BLOOD PRESSURE >= 90 MM HG: ICD-10-PCS | Mod: CPTII,S$GLB,, | Performed by: NURSE PRACTITIONER

## 2023-02-07 PROCEDURE — 1160F PR REVIEW ALL MEDS BY PRESCRIBER/CLIN PHARMACIST DOCUMENTED: ICD-10-PCS | Mod: CPTII,S$GLB,, | Performed by: NURSE PRACTITIONER

## 2023-02-07 PROCEDURE — 3077F PR MOST RECENT SYSTOLIC BLOOD PRESSURE >= 140 MM HG: ICD-10-PCS | Mod: CPTII,S$GLB,, | Performed by: NURSE PRACTITIONER

## 2023-02-07 PROCEDURE — 3008F BODY MASS INDEX DOCD: CPT | Mod: CPTII,S$GLB,, | Performed by: NURSE PRACTITIONER

## 2023-02-07 PROCEDURE — 99214 OFFICE O/P EST MOD 30 MIN: CPT | Mod: S$GLB,,, | Performed by: NURSE PRACTITIONER

## 2023-02-07 PROCEDURE — 99999 PR PBB SHADOW E&M-EST. PATIENT-LVL IV: CPT | Mod: PBBFAC,,, | Performed by: NURSE PRACTITIONER

## 2023-02-07 PROCEDURE — 99214 PR OFFICE/OUTPT VISIT, EST, LEVL IV, 30-39 MIN: ICD-10-PCS | Mod: S$GLB,,, | Performed by: NURSE PRACTITIONER

## 2023-02-07 PROCEDURE — 3008F PR BODY MASS INDEX (BMI) DOCUMENTED: ICD-10-PCS | Mod: CPTII,S$GLB,, | Performed by: NURSE PRACTITIONER

## 2023-02-07 PROCEDURE — 1160F RVW MEDS BY RX/DR IN RCRD: CPT | Mod: CPTII,S$GLB,, | Performed by: NURSE PRACTITIONER

## 2023-02-07 RX ORDER — LACOSAMIDE 200 MG/1
200 TABLET ORAL NIGHTLY
Qty: 30 TABLET | Refills: 5 | Status: SHIPPED | OUTPATIENT
Start: 2023-02-07 | End: 2023-06-05

## 2023-02-07 RX ORDER — HYDROXYZINE HYDROCHLORIDE 25 MG/1
2 TABLET, FILM COATED ORAL 2 TIMES DAILY
COMMUNITY
End: 2023-06-05 | Stop reason: SDUPTHER

## 2023-02-07 RX ORDER — LACOSAMIDE 150 MG/1
150 TABLET ORAL EVERY MORNING
Qty: 30 TABLET | Refills: 5 | Status: SHIPPED | OUTPATIENT
Start: 2023-02-07 | End: 2023-06-05

## 2023-02-07 NOTE — ASSESSMENT & PLAN NOTE
-Increase vimpat to 150 mg in AM and 200 mg in PM given increase in her traditional seizure activity

## 2023-02-07 NOTE — PROGRESS NOTES
"Subjective:       Patient ID: Christie Marcum is a 62 y.o. female.    Chief Complaint: Seizures (Pt states having moments of difficulty with comprehension has had two incidents last episode was a week ago denies any symptoms of seizure like activity during events; Pt's  states she has had five seizures in 01/2023 last seizure 2/4/2023 lasting half hour  was present loss of consciousness; currently vimpat 150 BID, top 100 BID, clonazepam 0.5 qPM)      History of Present Illness:  Follow-up visit for seizures.  She called for a sooner than regularly scheduled appointment due to episodes of confusion.  She is here with her .  Recall, patient has typical seizure activity is about 1 per month.  In January and February, patient has had 1 episode each month where she has difficulty comprehending and conversation.  With both episodes, she was at home with her .  They were having normal conversation.  Mid conversation, she could not comprehend anything he was saying.  She kept asking "what are you saying?" and " I do not understand." This lasted for a couple of hours both times.  The patient remembers both events.  Both patient and deny any associated historical seizure-like activity.  They do note a slight increase in her typical seizure frequency.  She is getting about 2 seizures a month now consisting of right leg jerking, staring off, and clearing her throat or jerking followed by about 30-60 minutes of profound sleepiness.  She denies any changes in her medications.  She says that there were no stressful events that would have provoked the episodes of comprehension difficulty.  She denies any illnesses around those times.  She is on venlafaxine which is prescribed by her PCP.  She denies any recent lab work.      Review of Systems  I have reviewed a 12 point review of systems which is negative unless otherwise stated in HPI        Past Medical History:   Diagnosis Date    Anxiety     " Arthritis     Cancer of splenic flexure     COPD (chronic obstructive pulmonary disease)     Hypertension     KHANH on CPAP     Oxygen dependent        Past Surgical History:   Procedure Laterality Date    COLON SURGERY      COLONOSCOPY      COMPLEX CYSTOMETROGRAM      EMG      HYSTERECTOMY      Left breas biopsy      Right breast biopsy          Family History   Problem Relation Age of Onset    Hypertension Mother     Stroke Mother     Hypertension Father     Stroke Father     Hyperlipidemia Sister     Kidney disease Sister         Social History     Socioeconomic History    Marital status:    Occupational History    Occupation: unemployed   Tobacco Use    Smoking status: Never    Smokeless tobacco: Never   Substance and Sexual Activity    Alcohol use: Not Currently    Drug use: Never        Outpatient Encounter Medications as of 2/7/2023   Medication Sig Dispense Refill    albuterol (PROVENTIL/VENTOLIN HFA) 90 mcg/actuation inhaler Inhale into the lungs.      benzonatate (TESSALON) 200 MG capsule Take 200 mg by mouth 3 (three) times daily as needed.      cholestyramine (QUESTRAN) 4 gram packet Take by mouth.      clonazePAM (KLONOPIN) 1 MG tablet TAKE 0.5 TABLETS BY MOUTH NIGHTLY. 15 tablet 3    clorazepate (TRANXENE) 7.5 MG Tab Take 7.5 mg by mouth 2 (two) times daily.      fexofenadine (ALLEGRA) 60 MG tablet Take 60 mg by mouth.      fluticasone propionate (FLONASE) 50 mcg/actuation nasal spray 1 spray by Nasal route.      hydrOXYzine pamoate (VISTARIL) 25 MG Cap Take 2 capsules (50 mg total) by mouth 2 (two) times a day. 360 capsule 3    latanoprost 0.005 % ophthalmic solution Place 1 drop into both eyes nightly.      lisinopriL (PRINIVIL,ZESTRIL) 40 MG tablet   See Instructions, TAKE ONE TABLET BY MOUTH EVERY DAY, # 90 tab(s), 3 Refill(s), Pharmacy: [facility]/pharmacy #[id number], 167, cm, Height/Length Dosing, 03/21/22 10:39:00 CDT, 123.45, kg, Weight Dosing, 03/21/22 10:39:00 CDT      meloxicam (MOBIC) 15 MG tablet  "Take 1 tablet (15 mg total) by mouth once daily. 14 tablet 0    OPTICHAMBER ADULT MASK-LARGE Eli USE AS DIRECTED WITH SYMBICORT      SYMBICORT 160-4.5 mcg/actuation HFAA Inhale into the lungs.      topiramate (TOPAMAX) 100 MG tablet TAKE 1 TABLET BY MOUTH TWICE A  tablet 1    venlafaxine (EFFEXOR-XR) 37.5 MG 24 hr capsule Take 1 capsule (37.5 mg total) by mouth once daily. To take with 75mg once daily 30 capsule 11    venlafaxine (EFFEXOR-XR) 75 MG 24 hr capsule Take 1 capsule (75 mg total) by mouth once daily. 90 capsule 2    [DISCONTINUED] lacosamide (VIMPAT) 150 mg Tab tablet TAKE 1 TABLET BY MOUTH TWICE A DAY 60 tablet 3    CLENPIQ 10 mg-3.5 gram -12 gram/160 mL Soln Take by mouth.      esomeprazole (NEXIUM) 40 MG capsule Take 40 mg by mouth once daily.      famotidine (PEPCID) 40 MG tablet Take 40 mg by mouth once daily.      garlic 1,000 mg Cap Take by mouth.      hydrOXYzine HCL (ATARAX) 25 MG tablet Take 2 tablets by mouth 2 (two) times a day.      lacosamide (VIMPAT) 150 mg Tab tablet Take 1 tablet (150 mg total) by mouth every morning. 30 tablet 5    lacosamide (VIMPAT) 200 mg Tab tablet Take 1 tablet (200 mg total) by mouth every evening. 30 tablet 5    mirabegron (MYRBETRIQ) 50 mg Tb24 Take 50 mg by mouth.      ondansetron (ZOFRAN-ODT) 4 MG TbDL Take 1 tablet (4 mg total) by mouth every 8 (eight) hours as needed (nausea). (Patient not taking: Reported on 2/7/2023) 15 tablet 0    pantoprazole (PROTONIX) 40 MG tablet Take 40 mg by mouth once daily.      [DISCONTINUED] clonazePAM (KLONOPIN) 1 MG tablet Take 0.5 tablets (0.5 mg total) by mouth nightly. 15 tablet 3     No facility-administered encounter medications on file as of 2/7/2023.      Objective:   BP (!) 142/90   Pulse 90   Ht 5' 5" (1.651 m)   Wt 120.2 kg (265 lb)   LMP  (LMP Unknown)   BMI 44.10 kg/m²        Physical Exam  General:  Alert and oriented  NAD  No overt edema    Cognition and Comprehension:  Speech and language " "intact  Follows commands    Cranial nerves:   CN 2_ Visual fields (full to confrontation both eyes)  CN 3, 4, 6_ Intact, ISSAC, no nystagmus  CN 5_facial tactile sensation intact  CN 7_no face asymmetry; normal eye closure and smile  CN 8_hearing intact to spoken voice  CN 9, 10, 11_voice normal, shoulder shrug ok; deltoids not fatigable   CN 12 tongue_protrudes mid line    Muscle Strength and Tone:  Normal upper extremity tone  Normal lower extremity tone  Normal upper extremity strength  Normal lower extremity strength    Reflexes:  Normal and symmetric    Coordination and Gait:  Coordination and gait are normal   No ataxia with FTN      Assessment & Plan:      1. Episodic confusion  Assessment & Plan:  -High suspicion for nonepileptic events.  Order for labs to r/o metabolic cause.  Discussed counseling for coping strategies as anxiety and stress have historically provoked some of her seizure activity    Orders:  -     TSH; Future; Expected date: 02/07/2023  -     Vitamin B12; Future; Expected date: 02/07/2023  -     Comprehensive metabolic panel; Future; Expected date: 02/07/2023  -     Ammonia; Future; Expected date: 02/07/2023    2. Nonintractable epilepsy without status epilepticus, unspecified epilepsy type  Overview:  Has had seizures since the age of 13. Initial seizure was in the setting of acute illness with hydronephrosis and continued to have seizures frequently. Used to stare and throw dolls at her sister and did not know they were seizures.  SHe used to have 30-40 seizures a month. Currently seizure frequency is about 1 every 6 months. She is on vimpat 200 BID, topamax 200 BID. She also takes clorazepate, clonazepam 1 BID. She has 2 types of seizures. First type is characterized by starting with "lord sola help me" and she makes sign of cross, then stares, hits her leg, falls to ground if standing lasting for 1-2 minutes. has postictal confusion for 5 minutes to 15 minutes. Has never had a " "generalized tonic clonic seizure witnessed. Second type is nocturnal. She has had a seizure in her sleep and woke up with tongue bitten. Overall she is very happy with her seizure frequency currently as it is much improved. She does complain of short-term memory problems, which she attributes to the medications.      Assessment & Plan:  -Increase vimpat to 150 mg in AM and 200 mg in PM given increase in her traditional seizure activity      3. Partial symptomatic epilepsy with complex partial seizures, not intractable, without status epilepticus  Overview:  Has had seizures since the age of 13. Initial seizure was in the setting of acute illness with hydronephrosis and continued to have seizures frequently. Used to stare and throw dolls at her sister and did not know they were seizures.  SHe used to have 30-40 seizures a month. Currently seizure frequency is about 1 every 6 months. She is on vimpat 200 BID, topamax 200 BID. She also takes clorazepate, clonazepam 1 BID. She has 2 types of seizures. First type is characterized by starting with "lord sola help me" and she makes sign of cross, then stares, hits her leg, falls to ground if standing lasting for 1-2 minutes. has postictal confusion for 5 minutes to 15 minutes. Has never had a generalized tonic clonic seizure witnessed. Second type is nocturnal. She has had a seizure in her sleep and woke up with tongue bitten. Overall she is very happy with her seizure frequency currently as it is much improved. She does complain of short-term memory problems, which she attributes to the medications.      Assessment & Plan:  -Increase vimpat to 150 mg in AM and 200 mg in PM given increase in her traditional seizure activity    Orders:  -     lacosamide (VIMPAT) 150 mg Tab tablet; Take 1 tablet (150 mg total) by mouth every morning.  Dispense: 30 tablet; Refill: 5  -     lacosamide (VIMPAT) 200 mg Tab tablet; Take 1 tablet (200 mg total) by mouth every evening.  Dispense: " 30 tablet; Refill: 5          This note was created with the assistance of voice recognition software. There may be transcription errors as a result of using this technology however minimal. Effort has been made to assure accuracy of transcription but any obvious errors or omissions should be clarified with the author of the document.

## 2023-02-07 NOTE — ASSESSMENT & PLAN NOTE
-High suspicion for nonepileptic events.  Order for labs to r/o metabolic cause.  Discussed counseling for coping strategies as anxiety and stress have historically provoked some of her seizure activity

## 2023-02-09 DIAGNOSIS — G40.209 PARTIAL SYMPTOMATIC EPILEPSY WITH COMPLEX PARTIAL SEIZURES, NOT INTRACTABLE, WITHOUT STATUS EPILEPTICUS: ICD-10-CM

## 2023-02-09 RX ORDER — CLONAZEPAM 1 MG/1
TABLET ORAL
Qty: 15 TABLET | Refills: 3 | Status: SHIPPED | OUTPATIENT
Start: 2023-02-09 | End: 2023-06-05

## 2023-02-24 ENCOUNTER — LAB VISIT (OUTPATIENT)
Dept: LAB | Facility: HOSPITAL | Age: 63
End: 2023-02-24
Attending: NURSE PRACTITIONER
Payer: MEDICARE

## 2023-02-24 DIAGNOSIS — R41.0 EPISODIC CONFUSION: ICD-10-CM

## 2023-02-24 LAB
ALBUMIN SERPL-MCNC: 3.7 G/DL (ref 3.4–4.8)
ALBUMIN/GLOB SERPL: 1 RATIO (ref 1.1–2)
ALP SERPL-CCNC: 73 UNIT/L (ref 40–150)
ALT SERPL-CCNC: 34 UNIT/L (ref 0–55)
AMMONIA PLAS-MSCNC: 49.6 UMOL/L (ref 18–72)
AST SERPL-CCNC: 24 UNIT/L (ref 5–34)
BILIRUBIN DIRECT+TOT PNL SERPL-MCNC: 0.5 MG/DL
BUN SERPL-MCNC: 11.9 MG/DL (ref 9.8–20.1)
CALCIUM SERPL-MCNC: 9.2 MG/DL (ref 8.4–10.2)
CHLORIDE SERPL-SCNC: 108 MMOL/L (ref 98–107)
CO2 SERPL-SCNC: 25 MMOL/L (ref 23–31)
CREAT SERPL-MCNC: 1.18 MG/DL (ref 0.55–1.02)
GFR SERPLBLD CREATININE-BSD FMLA CKD-EPI: 52 MLS/MIN/1.73/M2
GLOBULIN SER-MCNC: 3.6 GM/DL (ref 2.4–3.5)
GLUCOSE SERPL-MCNC: 93 MG/DL (ref 82–115)
POTASSIUM SERPL-SCNC: 3.5 MMOL/L (ref 3.5–5.1)
PROT SERPL-MCNC: 7.3 GM/DL (ref 5.8–7.6)
SODIUM SERPL-SCNC: 139 MMOL/L (ref 136–145)
TSH SERPL-ACNC: 3.35 UIU/ML (ref 0.35–4.94)
VIT B12 SERPL-MCNC: 526 PG/ML (ref 213–816)

## 2023-02-24 PROCEDURE — 84443 ASSAY THYROID STIM HORMONE: CPT

## 2023-02-24 PROCEDURE — 82140 ASSAY OF AMMONIA: CPT

## 2023-02-24 PROCEDURE — 80053 COMPREHEN METABOLIC PANEL: CPT

## 2023-02-24 PROCEDURE — 82607 VITAMIN B-12: CPT

## 2023-02-24 PROCEDURE — 36415 COLL VENOUS BLD VENIPUNCTURE: CPT

## 2023-02-27 ENCOUNTER — TELEPHONE (OUTPATIENT)
Dept: NEUROLOGY | Facility: CLINIC | Age: 63
End: 2023-02-27

## 2023-02-27 NOTE — TELEPHONE ENCOUNTER
----- Message from KEILA Lobato sent at 2/27/2023  8:00 AM CST -----  Let her know her labs are overall normal.  Nothing to explain periods of confusion.  Kidney function very slightly elevated, but looks like this is about her baseline

## 2023-02-27 NOTE — TELEPHONE ENCOUNTER
Pt informed labs are overall normal nothing to explain periods of confusion. Kidney function slightly elevated but looks like this is her baseline.

## 2023-03-06 ENCOUNTER — TELEPHONE (OUTPATIENT)
Dept: NEUROLOGY | Facility: CLINIC | Age: 63
End: 2023-03-06
Payer: MEDICARE

## 2023-03-06 NOTE — TELEPHONE ENCOUNTER
Patient's fiance called reporting the patient has been having a lot more seizure activities since her medication increase with Vimpat. Patient was currently taking 150 mg qAm and 150 mg qPM. Patient is now taking 150 mg qAM and 200 mg qPM, but having more seizures. Patient's fiance states she has 1-2 a day and having some anxiety,too. Requesting a call back to further discuss if Dr. Worley or NATASHA Fraga can let her know if she can go back to 150 mg qPM. If so, she needs a refill sent to the pharmacy. Please advise.

## 2023-03-07 NOTE — TELEPHONE ENCOUNTER
Pt informed to decrease Vimpat to 150 mg Pt requesting what can she take to help with itching informed benadryl is usually helpful but will ask provider and will update

## 2023-03-07 NOTE — TELEPHONE ENCOUNTER
Sorry for the delay. Go back down to 150 mg. The increase in Vimpat shouldnt increase seizure activity since the medication helps to stop seizures but it could cause itching so I am okay with her going back down

## 2023-03-07 NOTE — TELEPHONE ENCOUNTER
Patient's fiance (Dave) called requesting a call back to further discuss how the patient seizure activities have increase and the patient is complaining of itching from the increase dosage. Patient was taking 150 mg of the Vimpat qPM and now is on 200 mg qPM. Patient wants to decrease the medication back to 150 mg. Please advise.

## 2023-03-08 NOTE — TELEPHONE ENCOUNTER
Pt informed will start benadryl to follow directions listed on the bottle/packaging for adult dosing

## 2023-03-13 ENCOUNTER — TELEPHONE (OUTPATIENT)
Dept: NEUROLOGY | Facility: CLINIC | Age: 63
End: 2023-03-13
Payer: MEDICARE

## 2023-03-13 NOTE — TELEPHONE ENCOUNTER
Patient's spouse (Dave) called reporting that the patient can't get there Klonopin 1 mg medication due to the insurance doesn't cover it anymore. Dave states the patient is still having seizures and nervousness. Requesting a call back to further discuss if the patient can have another medication. Please advise.

## 2023-03-14 NOTE — TELEPHONE ENCOUNTER
Spoke with pharmacy states was able to run through insurance and approved will fill medication    Left VM for Pt informed of medication fill

## 2023-06-05 ENCOUNTER — OFFICE VISIT (OUTPATIENT)
Dept: FAMILY MEDICINE | Facility: CLINIC | Age: 63
End: 2023-06-05
Payer: MEDICARE

## 2023-06-05 ENCOUNTER — TELEPHONE (OUTPATIENT)
Dept: NEUROLOGY | Facility: CLINIC | Age: 63
End: 2023-06-05
Payer: MEDICARE

## 2023-06-05 VITALS
SYSTOLIC BLOOD PRESSURE: 130 MMHG | BODY MASS INDEX: 41.88 KG/M2 | DIASTOLIC BLOOD PRESSURE: 88 MMHG | HEART RATE: 94 BPM | OXYGEN SATURATION: 98 % | HEIGHT: 65 IN | RESPIRATION RATE: 16 BRPM | WEIGHT: 251.38 LBS | TEMPERATURE: 98 F

## 2023-06-05 DIAGNOSIS — G47.33 OSA ON CPAP: ICD-10-CM

## 2023-06-05 DIAGNOSIS — G40.909 NONINTRACTABLE EPILEPSY WITHOUT STATUS EPILEPTICUS, UNSPECIFIED EPILEPSY TYPE: Primary | ICD-10-CM

## 2023-06-05 DIAGNOSIS — N32.81 OVERACTIVE BLADDER: ICD-10-CM

## 2023-06-05 DIAGNOSIS — J45.40 MODERATE PERSISTENT ASTHMA WITHOUT COMPLICATION: ICD-10-CM

## 2023-06-05 DIAGNOSIS — I10 PRIMARY HYPERTENSION: ICD-10-CM

## 2023-06-05 DIAGNOSIS — G40.209 PARTIAL SYMPTOMATIC EPILEPSY WITH COMPLEX PARTIAL SEIZURES, NOT INTRACTABLE, WITHOUT STATUS EPILEPTICUS: ICD-10-CM

## 2023-06-05 DIAGNOSIS — F41.9 ANXIETY: ICD-10-CM

## 2023-06-05 DIAGNOSIS — J44.9 CHRONIC OBSTRUCTIVE PULMONARY DISEASE, UNSPECIFIED COPD TYPE: ICD-10-CM

## 2023-06-05 DIAGNOSIS — K21.9 GASTROESOPHAGEAL REFLUX DISEASE, UNSPECIFIED WHETHER ESOPHAGITIS PRESENT: ICD-10-CM

## 2023-06-05 PROBLEM — U07.1 COVID-19: Status: RESOLVED | Noted: 2022-08-18 | Resolved: 2023-06-05

## 2023-06-05 PROCEDURE — 3075F PR MOST RECENT SYSTOLIC BLOOD PRESS GE 130-139MM HG: ICD-10-PCS | Mod: CPTII,,, | Performed by: FAMILY MEDICINE

## 2023-06-05 PROCEDURE — 3079F PR MOST RECENT DIASTOLIC BLOOD PRESSURE 80-89 MM HG: ICD-10-PCS | Mod: CPTII,,, | Performed by: FAMILY MEDICINE

## 2023-06-05 PROCEDURE — 99214 PR OFFICE/OUTPT VISIT, EST, LEVL IV, 30-39 MIN: ICD-10-PCS | Mod: ,,, | Performed by: FAMILY MEDICINE

## 2023-06-05 PROCEDURE — 1160F PR REVIEW ALL MEDS BY PRESCRIBER/CLIN PHARMACIST DOCUMENTED: ICD-10-PCS | Mod: CPTII,,, | Performed by: FAMILY MEDICINE

## 2023-06-05 PROCEDURE — 4010F PR ACE/ARB THEARPY RXD/TAKEN: ICD-10-PCS | Mod: CPTII,,, | Performed by: FAMILY MEDICINE

## 2023-06-05 PROCEDURE — 3075F SYST BP GE 130 - 139MM HG: CPT | Mod: CPTII,,, | Performed by: FAMILY MEDICINE

## 2023-06-05 PROCEDURE — 4010F ACE/ARB THERAPY RXD/TAKEN: CPT | Mod: CPTII,,, | Performed by: FAMILY MEDICINE

## 2023-06-05 PROCEDURE — 1159F PR MEDICATION LIST DOCUMENTED IN MEDICAL RECORD: ICD-10-PCS | Mod: CPTII,,, | Performed by: FAMILY MEDICINE

## 2023-06-05 PROCEDURE — 99214 OFFICE O/P EST MOD 30 MIN: CPT | Mod: ,,, | Performed by: FAMILY MEDICINE

## 2023-06-05 PROCEDURE — 3008F PR BODY MASS INDEX (BMI) DOCUMENTED: ICD-10-PCS | Mod: CPTII,,, | Performed by: FAMILY MEDICINE

## 2023-06-05 PROCEDURE — 3008F BODY MASS INDEX DOCD: CPT | Mod: CPTII,,, | Performed by: FAMILY MEDICINE

## 2023-06-05 PROCEDURE — 1160F RVW MEDS BY RX/DR IN RCRD: CPT | Mod: CPTII,,, | Performed by: FAMILY MEDICINE

## 2023-06-05 PROCEDURE — 3079F DIAST BP 80-89 MM HG: CPT | Mod: CPTII,,, | Performed by: FAMILY MEDICINE

## 2023-06-05 PROCEDURE — 1159F MED LIST DOCD IN RCRD: CPT | Mod: CPTII,,, | Performed by: FAMILY MEDICINE

## 2023-06-05 RX ORDER — VENLAFAXINE HYDROCHLORIDE 75 MG/1
CAPSULE, EXTENDED RELEASE ORAL
Qty: 90 CAPSULE | Refills: 3 | Status: SHIPPED | OUTPATIENT
Start: 2023-06-05

## 2023-06-05 RX ORDER — LEVOCETIRIZINE DIHYDROCHLORIDE 5 MG/1
1 TABLET, FILM COATED ORAL NIGHTLY
COMMUNITY

## 2023-06-05 RX ORDER — LISINOPRIL 40 MG/1
40 TABLET ORAL DAILY
Qty: 90 TABLET | Refills: 3 | Status: SHIPPED | OUTPATIENT
Start: 2023-06-05 | End: 2023-12-27 | Stop reason: SDUPTHER

## 2023-06-05 RX ORDER — ONDANSETRON 8 MG/1
8 TABLET, ORALLY DISINTEGRATING ORAL EVERY 8 HOURS PRN
COMMUNITY
Start: 2023-05-17

## 2023-06-05 RX ORDER — CLONAZEPAM 1 MG/1
0.5 TABLET ORAL NIGHTLY PRN
Qty: 15 TABLET | Refills: 0 | Status: SHIPPED | OUTPATIENT
Start: 2023-06-05 | End: 2023-06-29 | Stop reason: SDUPTHER

## 2023-06-05 RX ORDER — HYDROXYZINE HYDROCHLORIDE 25 MG/1
50 TABLET, FILM COATED ORAL 4 TIMES DAILY PRN
Qty: 90 TABLET | Refills: 11 | Status: SHIPPED | OUTPATIENT
Start: 2023-06-05 | End: 2023-08-09

## 2023-06-05 RX ORDER — LACOSAMIDE 150 MG/1
150 TABLET ORAL EVERY MORNING
Qty: 60 TABLET | Refills: 5 | Status: SHIPPED | OUTPATIENT
Start: 2023-06-05 | End: 2023-07-12 | Stop reason: SDUPTHER

## 2023-06-05 NOTE — ASSESSMENT & PLAN NOTE
Follows with neuro.  Has been off klonopin x>2 months due to problem with insurance/pharmacy filling meds. Reports compliance with other seizure meds. Reports increase in seizure activity since being off this med and after shingrix #1 in 4/2023. Has not contacted neurology.  Advised patient to contact their office asap to adjust meds, talk with insurance or see her sooner than her scheduled appt in 8/2023 for evaluation due to change in her symptoms. Patient stated she would call today

## 2023-06-05 NOTE — PROGRESS NOTES
"Subjective:        Patient ID: Christie Marcum is a 62 y.o. female.    Chief Complaint: Follow-up (6 month follow up- epilepsy, COPD, anxiety/depression/Patient has c/o "nervousness" and frequent seizures over the past week. 2 weeks ago she was dx with [place] [facility]  Also stopped taking clonazepam 2 months ago due to insurance no longer paying. Requesting refill of lisinopril )      Very pleasant patient presents to clinic with her spouse, constantine, for follow up.  She is due for her wellness visit in November    States got shingrix #1 4/20/2023 and started having increase in seizures after shot.  Does not plan on getting shingrix #2.  Did not let neuro know. States still having seizures.  C/o nervousness. Not eating. Has lost weight since lov.  She has epilepsy. She is followed by neurology, Dr. Worley.  on vimpat, topamax and effexor.  Was on klonopin qhs but states stopped klonopin x >2 months ago since insurance/pharmacy is not paying for it.  Has been feeling more nervous and more seizures since stopping. Did not let neuro know.  Saw her last 2/2023. Has appt 8/2023.  feels like loud noises set off seizures.  Not sure if memory concern is related to seizures              Still seeing dr. Waggoner for her GI issues.    Did colonoscopy and egd at [facility]. Started on clenpiq.  Helped with diarrhea a lot. was Dx with viral gastritis at [facility].  Resolved on its own.       Has seasonal allergies and is on allegra.     She has KHANH on CPAP, COPD and emphysema and is prescribed oxygen therapy 24/7. Her pulmonologist was Dr. Terry. She was discharged from his office 12/2021.  she does not have her O2 today because insurance/pulmonology office is giving her trouble. she is not using her cpap either due to same issues.  she is using [employer] in [place]. she is not wearing o2 today. She established with dr. Mortensen.  She thinks patient may have asthma and not copd.  Doing more testing with her. Reports " compliance with her inhaler. Saw her last in 3/2023. Has follow up 9/2023     has anxiety/ depression and is on effexor and hydroxyzine.  Taking 50mg bid. Still feeling anxious.      had lap erin at [facility] 7/26/2021 with dr. lorena cortes. gave norco and pantoprazole and made her seizures worse. once stopped meds, seizures stopped. she has followed up post op with him.      She has a history of cancer of the splenic flexure and surgery and reanastomosis. She is followed by Dr. Gillespie. saw them last 4/2021. took mediport out 6/2021. Previously saw dr. doyle. he no longer takes her insurance. is now seeing dr. dheeraj yepez in [place]. only seeing prn. has appt in 2 years for colonoscopy     She has overactive bladder and urinary incontinence. Her urologist is Dr. Flor. has seen him in 2020. not currently on meds for her bladder. not currently seeing him. The patient had hallucinations with oxybutynin. She tolerated Toviaz however she is having trouble with her insurance paying for this. They also discussed possible Botox injections.     Has hypertension and is compliant with her medication. she is on lisinopril qhs. Needs refill. refuses asa     has glaucoma. on drops. dr. perry at [facility] office. However she states she will now be seeing [facility].     She is obese. Has lost weight since lov.     Last pap smear 5/2019 and was normal and HPV negative. She reports a history of partial hysterectomy at age 29. She still has her ovaries. She has had abnormal mammogram on 5/7/19 with subsequent breast biopsy done at [facility] of bilateral breasts which showed fibroadenoma and stromal fibrosis. mmg 7/17/2020 at [facility] was normal. at her visit on 8/24/21 she reported felt lumps in right breast. states did mmg at [facility] 9/2021 which was normal. copy in chart. recommended annual screening.     She is ALLERGIC to Keflex . She is also ALLERGIC to Aleve, codeine, naproxen, tetracycline and Ultram  ". she reports a positive family history of diabetes in older sister.    got 2022 flu shot.    Review of Systems   Constitutional: Negative.    HENT: Negative.     Respiratory: Negative.     Cardiovascular: Negative.    Gastrointestinal: Negative.    Genitourinary: Negative.    Neurological:  Positive for seizures.   Psychiatric/Behavioral:  The patient is nervous/anxious.        Review of patient's allergies indicates:   Allergen Reactions    Cephalexin      Other reaction(s): Seizure  hallucinations    Other reaction(s): Unknown    Ciprofloxacin      Other reaction(s): Seiurus  Other reaction(s): Unknown    Codeine      Other reaction(s): Seizure  seizure      Metronidazole      Other reaction(s): Seizure    Metronidazole hcl      Other reaction(s): Unknown    Naproxen      Other reaction(s): Seizure  Nausea/seizure      Pantoprazole sodium      Other reaction(s): Unknown    Tetracycline      Other reaction(s): Seizure  seizure      Tramadol Other (See Comments)     Other reaction(s): Seizure  SEIZURES HALLUCINATION        Vitals:    06/05/23 1024   BP: 130/88   BP Location: Left arm   Pulse: 94   Resp: 16   Temp: 97.7 °F (36.5 °C)   TempSrc: Temporal   SpO2: 98%   Weight: 114 kg (251 lb 6.4 oz)   Height: 5' 5" (1.651 m)      Social History     Socioeconomic History    Marital status:    Occupational History    Occupation: unemployed   Tobacco Use    Smoking status: Never    Smokeless tobacco: Never   Substance and Sexual Activity    Alcohol use: Not Currently    Drug use: Never      Family History   Problem Relation Age of Onset    Hypertension Mother     Stroke Mother     Hypertension Father     Stroke Father     Hyperlipidemia Sister     Kidney disease Sister           Objective:     Physical Exam  Vitals and nursing note reviewed.   Constitutional:       Appearance: Normal appearance. She is obese.   HENT:      Head: Normocephalic and atraumatic.      Nose: Nose normal.      Mouth/Throat:      Mouth: " Mucous membranes are moist.      Pharynx: Oropharynx is clear.   Eyes:      Extraocular Movements: Extraocular movements intact.   Cardiovascular:      Rate and Rhythm: Normal rate and regular rhythm.      Pulses: Normal pulses.      Heart sounds: Normal heart sounds.   Pulmonary:      Effort: Pulmonary effort is normal.      Breath sounds: Normal breath sounds.   Musculoskeletal:         General: Normal range of motion.      Cervical back: Normal range of motion.   Skin:     General: Skin is warm and dry.   Neurological:      General: No focal deficit present.      Mental Status: She is alert and oriented to person, place, and time. Mental status is at baseline.   Psychiatric:         Mood and Affect: Mood normal.     Current Outpatient Medications on File Prior to Visit   Medication Sig Dispense Refill    albuterol (PROVENTIL/VENTOLIN HFA) 90 mcg/actuation inhaler Inhale into the lungs.      benzonatate (TESSALON) 200 MG capsule Take 200 mg by mouth 3 (three) times daily as needed.      cholestyramine (QUESTRAN) 4 gram packet Take by mouth.      CLENPIQ 10 mg-3.5 gram -12 gram/160 mL Soln Take by mouth.      esomeprazole (NEXIUM) 40 MG capsule Take 40 mg by mouth once daily.      famotidine (PEPCID) 40 MG tablet Take 40 mg by mouth once daily.      fexofenadine (ALLEGRA) 60 MG tablet Take 60 mg by mouth.      fluticasone propionate (FLONASE) 50 mcg/actuation nasal spray 1 spray by Nasal route.      garlic 1,000 mg Cap Take by mouth.      lacosamide (VIMPAT) 150 mg Tab tablet Take 1 tablet (150 mg total) by mouth every morning. 30 tablet 5    lacosamide (VIMPAT) 200 mg Tab tablet Take 1 tablet (200 mg total) by mouth every evening. 30 tablet 5    latanoprost 0.005 % ophthalmic solution Place 1 drop into both eyes nightly.      levocetirizine (XYZAL) 5 MG tablet Take 1 tablet by mouth every evening.      meloxicam (MOBIC) 15 MG tablet Take 1 tablet (15 mg total) by mouth once daily. 14 tablet 0    mirabegron  (MYRBETRIQ) 50 mg Tb24 Take 50 mg by mouth.      ondansetron (ZOFRAN-ODT) 8 MG TbDL Take 8 mg by mouth every 8 (eight) hours as needed.      OPTICHAMBER ADULT MASK-LARGE Eli USE AS DIRECTED WITH SYMBICORT      pantoprazole (PROTONIX) 40 MG tablet Take 40 mg by mouth once daily.      SYMBICORT 160-4.5 mcg/actuation HFAA Inhale into the lungs.      topiramate (TOPAMAX) 100 MG tablet TAKE 1 TABLET BY MOUTH TWICE A  tablet 1    venlafaxine (EFFEXOR-XR) 37.5 MG 24 hr capsule Take 1 capsule (37.5 mg total) by mouth once daily. To take with 75mg once daily 30 capsule 11    [DISCONTINUED] clonazePAM (KLONOPIN) 1 MG tablet TAKE 0.5 TABLETS BY MOUTH NIGHTLY. (Patient not taking: Reported on 6/5/2023) 15 tablet 3    [DISCONTINUED] clorazepate (TRANXENE) 7.5 MG Tab Take 7.5 mg by mouth 2 (two) times daily.      [DISCONTINUED] hydrOXYzine HCL (ATARAX) 25 MG tablet Take 2 tablets by mouth 2 (two) times a day.      [DISCONTINUED] hydrOXYzine pamoate (VISTARIL) 25 MG Cap Take 2 capsules (50 mg total) by mouth 2 (two) times a day. 360 capsule 3    [DISCONTINUED] lisinopriL (PRINIVIL,ZESTRIL) 40 MG tablet   See Instructions, TAKE ONE TABLET BY MOUTH EVERY DAY, # 90 tab(s), 3 Refill(s), Pharmacy: [facility]/pharmacy #[id number], 167, cm, Height/Length Dosing, 03/21/22 10:39:00 CDT, 123.45, kg, Weight Dosing, 03/21/22 10:39:00 CDT      [DISCONTINUED] ondansetron (ZOFRAN-ODT) 4 MG TbDL Take 1 tablet (4 mg total) by mouth every 8 (eight) hours as needed (nausea). (Patient not taking: Reported on 2/7/2023) 15 tablet 0     No current facility-administered medications on file prior to visit.     Health Maintenance   Topic Date Due    Mammogram  10/21/2023    Lipid Panel  09/13/2027    TETANUS VACCINE  09/09/2030    Hepatitis C Screening  Completed      Results for orders placed or performed in visit on 02/24/23   TSH   Result Value Ref Range    Thyroid Stimulating Hormone 3.351 0.350 - 4.940 uIU/mL   Vitamin B12   Result Value Ref Range     Vitamin B12 Level 526 213 - 816 pg/mL   Comprehensive metabolic panel   Result Value Ref Range    Sodium Level 139 136 - 145 mmol/L    Potassium Level 3.5 3.5 - 5.1 mmol/L    Chloride 108 (H) 98 - 107 mmol/L    Carbon Dioxide 25 23 - 31 mmol/L    Glucose Level 93 82 - 115 mg/dL    Blood Urea Nitrogen 11.9 9.8 - 20.1 mg/dL    Creatinine 1.18 (H) 0.55 - 1.02 mg/dL    Calcium Level Total 9.2 8.4 - 10.2 mg/dL    Protein Total 7.3 5.8 - 7.6 gm/dL    Albumin Level 3.7 3.4 - 4.8 g/dL    Globulin 3.6 (H) 2.4 - 3.5 gm/dL    Albumin/Globulin Ratio 1.0 (L) 1.1 - 2.0 ratio    Bilirubin Total 0.5 <=1.5 mg/dL    Alkaline Phosphatase 73 40 - 150 unit/L    Alanine Aminotransferase 34 0 - 55 unit/L    Aspartate Aminotransferase 24 5 - 34 unit/L    eGFR 52 mls/min/1.73/m2   Ammonia   Result Value Ref Range    Ammonia Level 49.6 18.0 - 72.0 umol/L          Assessment & Plan:     Active Problem List with Overview Notes    Diagnosis Date Noted    Episodic confusion 02/07/2023    Decreased hearing of left ear 12/07/2022    Acute pain of both knees 12/07/2022    Advanced care planning/counseling discussion 11/02/2022    Seasonal allergies 11/02/2022    Postmenopausal 11/02/2022    Low back pain 09/21/2022    Arthritis 09/21/2022    On home oxygen therapy 09/21/2022    Arthralgia of hip 09/21/2022    Moderate persistent asthma without complication 08/25/2022    Overactive bladder 07/05/2022    Family history of diabetes mellitus 07/05/2022    Breast cancer screening by mammogram 07/05/2022    Anxiety 07/05/2022    Depressive disorder 07/05/2022    Epilepsy 07/05/2022     Has had seizures since the age of 13. Initial seizure was in the setting of acute illness with hydronephrosis and continued to have seizures frequently. Used to stare and throw dolls at her sister and did not know they were seizures.  SHe used to have 30-40 seizures a month. Currently seizure frequency is about 1 every 6 months. She is on vimpat 200 BID, topamax 200 BID.  "She also takes clorazepate, clonazepam 1 BID. She has 2 types of seizures. First type is characterized by starting with "lord sola help me" and she makes sign of cross, then stares, hits her leg, falls to ground if standing lasting for 1-2 minutes. has postictal confusion for 5 minutes to 15 minutes. Has never had a generalized tonic clonic seizure witnessed. Second type is nocturnal. She has had a seizure in her sleep and woke up with tongue bitten. Overall she is very happy with her seizure frequency currently as it is much improved. She does complain of short-term memory problems, which she attributes to the medications.        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 Nonintractable epilepsy without status epilepticus, unspecified epilepsy type  Overview:  Has had seizures since the age of 13. Initial seizure was in the setting of acute illness with hydronephrosis and continued to have seizures frequently. Used to stare and throw dolls at her sister and did not know they were seizures.  SHe used to have 30-40 seizures a month. Currently seizure frequency is about 1 every 6 months. She is on vimpat 200 BID, topamax 200 BID. She also takes clorazepate, clonazepam 1 BID. She has 2 types of seizures. First type is characterized by starting with "lord sola help me" and she makes sign of cross, then stares, hits her leg, falls to ground if standing lasting for 1-2 minutes. has postictal confusion for 5 minutes to 15 minutes. Has never had a generalized tonic clonic seizure witnessed. Second type is nocturnal. She has had a seizure in " her sleep and woke up with tongue bitten. Overall she is very happy with her seizure frequency currently as it is much improved. She does complain of short-term memory problems, which she attributes to the medications.      Assessment & Plan:  Follows with neuro.  Has been off klonopin x>2 months due to problem with insurance/pharmacy filling meds. Reports compliance with other seizure meds. Reports increase in seizure activity since being off this med and after shingrix #1 in 4/2023. Has not contacted neurology.  Advised patient to contact their office asap to adjust meds, talk with insurance or see her sooner than her scheduled appt in 8/2023 for evaluation due to change in her symptoms. Patient stated she would call today      2. Anxiety  Assessment & Plan:  Stable on effexor. Was on klonopin in past. Off x>2 months.  Will increase hydroxyzine until she can follow up with neuro.new rx sent in. Take prn as directed. Contact clinic for concerns.  If she is able to get back on klonopin, she may be able to decrease hydroxyzine dose    Orders:  -     hydrOXYzine HCL (ATARAX) 25 MG tablet; Take 2 tablets (50 mg total) by mouth 4 (four) times daily as needed for Anxiety. May cause drowsiness.  Dispense: 90 tablet; Refill: 11    3. Primary hypertension  Assessment & Plan:  Well controlled on current prescription meds. Refill sent in as requested    Orders:  -     lisinopriL (PRINIVIL,ZESTRIL) 40 MG tablet; Take 1 tablet (40 mg total) by mouth once daily.  Dispense: 90 tablet; Refill: 3    4. Chronic obstructive pulmonary disease, unspecified COPD type  Assessment & Plan:  Keep appts with pulm.  Reports compliance with her inhalers. Not wearing O2 today in clinic      5. Moderate persistent asthma without complication  Assessment & Plan:  See copd A&P      6. KHANH on CPAP  Assessment & Plan:  Reports compliance and benefits from continued use          7. Overactive bladder  Assessment & Plan:  Stable On  Myrbetriq. Keep  appts with urology      8. Gastroesophageal reflux disease, unspecified whether esophagitis present  Assessment & Plan:  Stable on ppi and pepcid prescriptions. Keep appts with GI           Follow up in about 6 months (around 12/5/2023) for Medicare Wellness with labs.

## 2023-06-05 NOTE — ASSESSMENT & PLAN NOTE
Stable on effexor. Was on klonopin in past. Off x>2 months.  Will increase hydroxyzine until she can follow up with neuro.new rx sent in. Take prn as directed. Contact clinic for concerns.  If she is able to get back on klonopin, she may be able to decrease hydroxyzine dose

## 2023-06-05 NOTE — TELEPHONE ENCOUNTER
Patient is requesting a refill for Klonopin 1 mg  .5 tab as need for anxiety. States has been off of Klonopin for about 2 months. Her insurance refused to pay for it. She is having a lot of anxiety that can trigger her seizure Please advise.

## 2023-06-05 NOTE — TELEPHONE ENCOUNTER
Medication: Vimpat 150 mg and Clonazepam 1 mg    Pharmacy: [facility] in [place] on [facility]     Last Appointment: 2/7/2023     Next Appointment: 8/29/2023     Call back number: [phone]

## 2023-06-29 DIAGNOSIS — G40.209 PARTIAL SYMPTOMATIC EPILEPSY WITH COMPLEX PARTIAL SEIZURES, NOT INTRACTABLE, WITHOUT STATUS EPILEPTICUS: ICD-10-CM

## 2023-06-29 RX ORDER — CLONAZEPAM 1 MG/1
0.5 TABLET ORAL NIGHTLY PRN
Qty: 15 TABLET | Refills: 0 | Status: SHIPPED | OUTPATIENT
Start: 2023-06-29 | End: 2023-09-01

## 2023-06-29 NOTE — TELEPHONE ENCOUNTER
----- Message from Dinorah Hodge sent at 2023  8:02 AM CDT -----  Regarding: med refill  23 04:18p TAKEN  Rx Calls  To:          Ofc in AM  From:        Christie Marcum  Phone:       [phone]  Patient:     Same  :         1960   RegDr:      Dr Worley  Ref:         medication refills     ClrID:    [id number]

## 2023-07-03 ENCOUNTER — TELEPHONE (OUTPATIENT)
Dept: NEUROLOGY | Facility: CLINIC | Age: 63
End: 2023-07-03
Payer: MEDICARE

## 2023-07-03 NOTE — TELEPHONE ENCOUNTER
States following up on call with nurse Joy about this medication.   States she was advised by he pharmacy this prescription would be filled, but when she went to pharmacy yesterday 7/2/23 she was told this prescription could not be filled until 7/15/23 and advised she would have to get this clinic to sent a prescription to pharmacy in [place], [place].     States [facility] [place] [place] is  Phone: [phone].    Medication: Clonazepam 1 mg    Pharmacy:[facility]       Last Appointment: 2/7/23    Next Appointment: 8/29/23

## 2023-07-12 DIAGNOSIS — G40.209 PARTIAL SYMPTOMATIC EPILEPSY WITH COMPLEX PARTIAL SEIZURES, NOT INTRACTABLE, WITHOUT STATUS EPILEPTICUS: ICD-10-CM

## 2023-07-12 RX ORDER — LACOSAMIDE 150 MG/1
150 TABLET ORAL EVERY 12 HOURS
Qty: 60 TABLET | Refills: 5 | Status: SHIPPED | OUTPATIENT
Start: 2023-07-12 | End: 2023-10-03

## 2023-07-12 NOTE — TELEPHONE ENCOUNTER
Pt called reporting that she is completely out of Vimpat, pt took last dose this morning.  She reports [facility] in [place] will not refill her prescription d/t the RX reading Vimpat 150 mg 1 tab daily. Pt reports she is normally prescribed Vimpat 150 mg 1 tab BID.  Pt requesting for updated prescription to be sent to [facility].    # [phone]

## 2023-07-15 DIAGNOSIS — R56.9 SEIZURE: ICD-10-CM

## 2023-07-17 RX ORDER — VENLAFAXINE HYDROCHLORIDE 37.5 MG/1
37.5 CAPSULE, EXTENDED RELEASE ORAL DAILY
Qty: 90 CAPSULE | Refills: 3 | Status: SHIPPED | OUTPATIENT
Start: 2023-07-17 | End: 2024-07-16

## 2023-07-17 RX ORDER — TOPIRAMATE 100 MG/1
TABLET, FILM COATED ORAL
Qty: 180 TABLET | Refills: 1 | Status: SHIPPED | OUTPATIENT
Start: 2023-07-17 | End: 2023-12-01

## 2023-07-25 ENCOUNTER — TELEPHONE (OUTPATIENT)
Dept: FAMILY MEDICINE | Facility: CLINIC | Age: 63
End: 2023-07-25
Payer: MEDICARE

## 2023-07-25 DIAGNOSIS — I83.90 VARICOSE VEINS OF LOWER EXTREMITY, UNSPECIFIED LATERALITY, UNSPECIFIED WHETHER COMPLICATED: Primary | ICD-10-CM

## 2023-07-25 NOTE — TELEPHONE ENCOUNTER
I called the patient and she said that a doctor on TV told her that people need to have a cleansing of their vascular system. She said this is to help with her back pain, foot pain, and slight SOB when she exerts herself were some of her examples of her symptoms. She is asking for recommendations of supplements to help cleanse her vascular system, and also recommendations of herbal drinks to also help cleanse her vascular system.

## 2023-07-25 NOTE — TELEPHONE ENCOUNTER
----- Message from Grazyna Chung sent at 7/25/2023  2:53 PM CDT -----  Regarding: med advice  .Type:  Needs Medical Advice    Who Called: Pt  Symptoms (please be specific):    How long has patient had these symptoms:    Pharmacy name and phone #:    Would the patient rather a call back or a response via [facility]? Call back  Best Call Back Number: [phone]  Additional Information: Needs to speak with nurse about vascular concerns she has

## 2023-07-26 NOTE — TELEPHONE ENCOUNTER
I have signed for the following orders AND/OR meds.  Please call the patient and ask the patient to schedule the testing AND/OR inform about any medications that were sent.      Orders Placed This Encounter   Procedures    Ambulatory referral/consult to Vascular Surgery     Standing Status:   Future     Standing Expiration Date:   8/26/2024     Referral Priority:   Routine     Referral Type:   Consultation     Referral Reason:   Specialty Services Required     Requested Specialty:   Vascular Surgery     Number of Visits Requested:   1

## 2023-07-26 NOTE — TELEPHONE ENCOUNTER
I am not sure what to recommend.  If she is concerned she may have blockage, we can have her see vascular surgeon.

## 2023-08-09 RX ORDER — HYDROXYZINE PAMOATE 25 MG/1
CAPSULE ORAL
Qty: 360 CAPSULE | Refills: 3 | Status: SHIPPED | OUTPATIENT
Start: 2023-08-09

## 2023-08-31 DIAGNOSIS — G40.209 PARTIAL SYMPTOMATIC EPILEPSY WITH COMPLEX PARTIAL SEIZURES, NOT INTRACTABLE, WITHOUT STATUS EPILEPTICUS: ICD-10-CM

## 2023-09-01 RX ORDER — CLONAZEPAM 1 MG/1
0.5 TABLET ORAL NIGHTLY
Qty: 15 TABLET | Refills: 0 | Status: SHIPPED | OUTPATIENT
Start: 2023-09-01 | End: 2023-10-02 | Stop reason: SDUPTHER

## 2023-09-20 ENCOUNTER — OFFICE VISIT (OUTPATIENT)
Dept: FAMILY MEDICINE | Facility: CLINIC | Age: 63
End: 2023-09-20
Payer: MEDICARE

## 2023-09-20 VITALS
HEART RATE: 86 BPM | DIASTOLIC BLOOD PRESSURE: 80 MMHG | SYSTOLIC BLOOD PRESSURE: 136 MMHG | OXYGEN SATURATION: 97 % | WEIGHT: 267.81 LBS | RESPIRATION RATE: 18 BRPM | BODY MASS INDEX: 44.62 KG/M2 | HEIGHT: 65 IN | TEMPERATURE: 98 F

## 2023-09-20 DIAGNOSIS — J45.40 MODERATE PERSISTENT ASTHMA WITHOUT COMPLICATION: Primary | ICD-10-CM

## 2023-09-20 DIAGNOSIS — G62.9 PERIPHERAL POLYNEUROPATHY: ICD-10-CM

## 2023-09-20 PROCEDURE — 4010F PR ACE/ARB THEARPY RXD/TAKEN: ICD-10-PCS | Mod: CPTII,,, | Performed by: NURSE PRACTITIONER

## 2023-09-20 PROCEDURE — 3079F DIAST BP 80-89 MM HG: CPT | Mod: CPTII,,, | Performed by: NURSE PRACTITIONER

## 2023-09-20 PROCEDURE — 3075F SYST BP GE 130 - 139MM HG: CPT | Mod: CPTII,,, | Performed by: NURSE PRACTITIONER

## 2023-09-20 PROCEDURE — 4010F ACE/ARB THERAPY RXD/TAKEN: CPT | Mod: CPTII,,, | Performed by: NURSE PRACTITIONER

## 2023-09-20 PROCEDURE — 1159F MED LIST DOCD IN RCRD: CPT | Mod: CPTII,,, | Performed by: NURSE PRACTITIONER

## 2023-09-20 PROCEDURE — 3008F BODY MASS INDEX DOCD: CPT | Mod: CPTII,,, | Performed by: NURSE PRACTITIONER

## 2023-09-20 PROCEDURE — 3008F PR BODY MASS INDEX (BMI) DOCUMENTED: ICD-10-PCS | Mod: CPTII,,, | Performed by: NURSE PRACTITIONER

## 2023-09-20 PROCEDURE — 3079F PR MOST RECENT DIASTOLIC BLOOD PRESSURE 80-89 MM HG: ICD-10-PCS | Mod: CPTII,,, | Performed by: NURSE PRACTITIONER

## 2023-09-20 PROCEDURE — 1159F PR MEDICATION LIST DOCUMENTED IN MEDICAL RECORD: ICD-10-PCS | Mod: CPTII,,, | Performed by: NURSE PRACTITIONER

## 2023-09-20 PROCEDURE — 99214 OFFICE O/P EST MOD 30 MIN: CPT | Mod: ,,, | Performed by: NURSE PRACTITIONER

## 2023-09-20 PROCEDURE — 3075F PR MOST RECENT SYSTOLIC BLOOD PRESS GE 130-139MM HG: ICD-10-PCS | Mod: CPTII,,, | Performed by: NURSE PRACTITIONER

## 2023-09-20 PROCEDURE — 99214 PR OFFICE/OUTPT VISIT, EST, LEVL IV, 30-39 MIN: ICD-10-PCS | Mod: ,,, | Performed by: NURSE PRACTITIONER

## 2023-09-20 RX ORDER — ALBUTEROL SULFATE 90 UG/1
2 AEROSOL, METERED RESPIRATORY (INHALATION) EVERY 6 HOURS PRN
Qty: 18 G | Refills: 1 | Status: SHIPPED | OUTPATIENT
Start: 2023-09-20 | End: 2023-11-22

## 2023-09-20 RX ORDER — BUDESONIDE AND FORMOTEROL FUMARATE DIHYDRATE 160; 4.5 UG/1; UG/1
2 AEROSOL RESPIRATORY (INHALATION) EVERY 12 HOURS
Qty: 6 G | Refills: 3 | Status: SHIPPED | OUTPATIENT
Start: 2023-09-20 | End: 2023-12-22

## 2023-09-20 NOTE — ASSESSMENT & PLAN NOTE
Declines Gabapentin at this time  States she will proceed with spinal injection  F/U if no improvement  Keep appt with PCP for follow up

## 2023-09-20 NOTE — ASSESSMENT & PLAN NOTE
Worsening symptoms  Out of inhalers x  1 month  Resume inhaler; Rx sent Symbicort and Albuterol  Keep appt with Pulmonologist for follow up  Instructed to continue to monitor symptoms  Report to ED for any CP, SOB, and/or worsening symptoms  Pt is agreeable to plan and verbalizes understanding

## 2023-09-20 NOTE — PROGRESS NOTES
"Subjective:      Patient ID: Christie Marcum is a 63 y.o. Black or  female      Chief Complaint: Bilateral tingling under feet and Shortness of Breath       Past Medical History:   Diagnosis Date    Anxiety     Arthritis     Cancer of splenic flexure     COPD (chronic obstructive pulmonary disease)     Decreased hearing of left ear 12/7/2022    Depressive disorder 7/5/2022    Epilepsy 7/5/2022    Has had seizures since the age of 13. Initial seizure was in the setting of acute illness with hydronephrosis and continued to have seizures frequently. Used to stare and throw dolls at her sister and did not know they were seizures.  SHe used to have 30-40 seizures a month. Currently seizure frequency is about 1 every 6 months. She is on vimpat 200 BID, topamax 200 BID. She also takes clorazepate    Gastroesophageal reflux disease 7/5/2022    Hypertension     Low back pain 9/21/2022    Moderate persistent asthma without complication 8/25/2022    Neuropathy 7/5/2022    On home oxygen therapy 9/21/2022    KHANH on CPAP     Overactive bladder 7/5/2022        HPI  Presents to clinic for evaluation.      C/O of tingling bilateral feet x 1 week.  Denies any numbness.  States history of disc problems of lumbar spine.  She was offered epidural injection but declined.  Previously taking Gabapentin, but states she was taking for "seizure" disorder.  Denies any one-sided weakness, motor weakness, problems with speech/coordinations, syncope, or dizziness.        C/O of SOB and wheezing x 1 month.  Her Pulmonologist is Dr. Yamile Mortensen.  States she was previously thought to have COPD, but was later diagnosed with Asthma per Pulmonologist and was told she does not have COPD.  She has been out of inhalers (Symbicort and Albuterol) x 1 month due to issues with pharmacy and is requesting refills.  Denies any other problems.              Patient Care Team:  Mary Leon MD as PCP - General (Family Medicine)  Yamile Mortensen MD " (Pulmonary Disease)  Cornelio Waggoner MD as Consulting Physician (Gastroenterology)  Indira Worley MD as Consulting Physician (Neurology)  Tahir Ahmadi MD as Consulting Physician (Oncology)  Mitch Flor MD as Consulting Physician (Urology)      Review of Systems   Constitutional: Negative.  Negative for appetite change, chills and fever.   HENT: Negative.     Eyes: Negative.  Negative for discharge and redness.   Respiratory:  Positive for shortness of breath and wheezing. Negative for cough and chest tightness.    Cardiovascular: Negative.  Negative for chest pain.   Gastrointestinal: Negative.    Endocrine: Negative.    Genitourinary: Negative.    Integumentary:  Negative.   Allergic/Immunologic: Negative.    Neurological: Negative.    Psychiatric/Behavioral: Negative.     All other systems reviewed and are negative.          Objective:      Vitals:    09/20/23 0822   BP: 136/80   Pulse:    Resp:    Temp:       Body mass index is 44.56 kg/m².     Physical Exam  Vitals reviewed.   Constitutional:       Appearance: Normal appearance.   HENT:      Head: Normocephalic.      Mouth/Throat:      Mouth: Mucous membranes are moist.   Eyes:      Conjunctiva/sclera: Conjunctivae normal.      Pupils: Pupils are equal, round, and reactive to light.   Cardiovascular:      Rate and Rhythm: Normal rate and regular rhythm.   Pulmonary:      Effort: Pulmonary effort is normal. No respiratory distress.      Breath sounds: Normal breath sounds.   Musculoskeletal:         General: Normal range of motion.      Cervical back: Normal range of motion and neck supple.   Skin:     General: Skin is warm and dry.   Neurological:      Mental Status: She is alert and oriented to person, place, and time.   Psychiatric:         Mood and Affect: Mood normal.            Current Outpatient Medications:     lacosamide (VIMPAT) 150 mg Tab tablet, Take 1 tablet (150 mg total) by mouth every 12 (twelve) hours., Disp: 60  tablet, Rfl: 5    latanoprost 0.005 % ophthalmic solution, Place 1 drop into both eyes nightly., Disp: , Rfl:     lisinopriL (PRINIVIL,ZESTRIL) 40 MG tablet, Take 1 tablet (40 mg total) by mouth once daily., Disp: 90 tablet, Rfl: 3    topiramate (TOPAMAX) 100 MG tablet, TAKE 1 TABLET BY MOUTH TWICE A DAY, Disp: 180 tablet, Rfl: 1    venlafaxine (EFFEXOR-XR) 37.5 MG 24 hr capsule, TAKE 1 CAPSULE (37.5 MG TOTAL) BY MOUTH ONCE DAILY. TO TAKE WITH 75MG ONCE DAILY, Disp: 90 capsule, Rfl: 3    venlafaxine (EFFEXOR-XR) 75 MG 24 hr capsule, TAKE 1 CAPSULE BY MOUTH EVERY DAY, Disp: 90 capsule, Rfl: 3    albuterol (PROVENTIL/VENTOLIN HFA) 90 mcg/actuation inhaler, Inhale 2 puffs into the lungs every 6 (six) hours as needed for Wheezing., Disp: 18 g, Rfl: 1    benzonatate (TESSALON) 200 MG capsule, Take 200 mg by mouth 3 (three) times daily as needed., Disp: , Rfl:     cholestyramine (QUESTRAN) 4 gram packet, Take by mouth., Disp: , Rfl:     CLENPIQ 10 mg-3.5 gram -12 gram/160 mL Soln, Take by mouth., Disp: , Rfl:     clonazePAM (KLONOPIN) 1 MG tablet, TAKE 1/2 TABLET BY MOUTH EVERY NIGHT AT BEDTIME (Patient not taking: Reported on 9/20/2023), Disp: 15 tablet, Rfl: 0    esomeprazole (NEXIUM) 40 MG capsule, Take 40 mg by mouth once daily., Disp: , Rfl:     famotidine (PEPCID) 40 MG tablet, Take 40 mg by mouth once daily., Disp: , Rfl:     fexofenadine (ALLEGRA) 60 MG tablet, Take 60 mg by mouth., Disp: , Rfl:     garlic 1,000 mg Cap, Take by mouth., Disp: , Rfl:     hydrOXYzine pamoate (VISTARIL) 25 MG Cap, TAKE 2 CAPSULES BY MOUTH 2 TIMES A DAY. (Patient not taking: Reported on 9/20/2023), Disp: 360 capsule, Rfl: 3    levocetirizine (XYZAL) 5 MG tablet, Take 1 tablet by mouth every evening., Disp: , Rfl:     mirabegron (MYRBETRIQ) 50 mg Tb24, Take 50 mg by mouth., Disp: , Rfl:     ondansetron (ZOFRAN-ODT) 8 MG TbDL, Take 8 mg by mouth every 8 (eight) hours as needed., Disp: , Rfl:     OPTICHAMBER ADULT MASK-LARGE Eli, USE AS  DIRECTED WITH SYMBICORT, Disp: , Rfl:     pantoprazole (PROTONIX) 40 MG tablet, Take 40 mg by mouth once daily., Disp: , Rfl:     SYMBICORT 160-4.5 mcg/actuation HFAA, Inhale 2 puffs into the lungs every 12 (twelve) hours., Disp: 6 g, Rfl: 3    Assessment & Plan:     Problem List Items Addressed This Visit          Neuro    Peripheral polyneuropathy     Declines Gabapentin at this time  States she will proceed with spinal injection  F/U if no improvement  Keep appt with PCP for follow up            Pulmonary    Moderate persistent asthma without complication - Primary     Worsening symptoms  Out of inhalers x  1 month  Resume inhaler; Rx sent Symbicort and Albuterol  Keep appt with Pulmonologist for follow up  Instructed to continue to monitor symptoms  Report to ED for any CP, SOB, and/or worsening symptoms  Pt is agreeable to plan and verbalizes understanding             Relevant Medications    SYMBICORT 160-4.5 mcg/actuation HFAA    albuterol (PROVENTIL/VENTOLIN HFA) 90 mcg/actuation inhaler

## 2023-09-21 ENCOUNTER — TELEPHONE (OUTPATIENT)
Dept: NEUROLOGY | Facility: CLINIC | Age: 63
End: 2023-09-21
Payer: MEDICARE

## 2023-09-21 ENCOUNTER — TELEPHONE (OUTPATIENT)
Dept: FAMILY MEDICINE | Facility: CLINIC | Age: 63
End: 2023-09-21
Payer: MEDICARE

## 2023-09-21 NOTE — TELEPHONE ENCOUNTER
In the PA's note, it says she was offered injections in the past but declined.  Did she see someone?  If so, who?  She could just set up an appt with them    If she didn't see someone in the past for injections, then she will need an updated mri most likely and will need to have an office note to attach to the referral.  This can be addressed at her appt next anaid

## 2023-09-21 NOTE — TELEPHONE ENCOUNTER
States no she did not see anyone, but it was Dr. Kate. Informed when she comes in for her next office visit we will proceed with the referral so we can have updated information.

## 2023-09-21 NOTE — TELEPHONE ENCOUNTER
----- Message from Lydia Arambula sent at 9/21/2023  4:21 PM CDT -----    Patient Returning Call        Who Called:pt  Does the patient know what this is regarding?:need nurse to call before leaving today  Would the patient rather a call back or a response via [facility]? call  Best Call Back Number:[phone]  Additional Information: call back

## 2023-09-21 NOTE — TELEPHONE ENCOUNTER
Returned call to pt. No answer. Left VM to contact office with questions and concerns. Waiting on call back.

## 2023-09-21 NOTE — TELEPHONE ENCOUNTER
States yesterday did go to see Dr. Carolyn KRAMER for tingling to her feet and was told the tingling is associated with the bulging disc in her back. NP recommend she start taking injections and ask that we send referral. States her back pain is getting worse. Please advise. [phone]

## 2023-09-26 ENCOUNTER — TELEPHONE (OUTPATIENT)
Dept: FAMILY MEDICINE | Facility: CLINIC | Age: 63
End: 2023-09-26
Payer: MEDICARE

## 2023-09-26 DIAGNOSIS — G62.9 NEUROPATHY: ICD-10-CM

## 2023-09-26 DIAGNOSIS — G40.909 NONINTRACTABLE EPILEPSY WITHOUT STATUS EPILEPTICUS, UNSPECIFIED EPILEPSY TYPE: Primary | ICD-10-CM

## 2023-09-26 NOTE — TELEPHONE ENCOUNTER
Pt has been with Dr. Worley's office since before epic switch, so there is not referral in current chart. Will need a new referral. Thank you

## 2023-09-26 NOTE — TELEPHONE ENCOUNTER
----- Message from Willard Nuno sent at 9/25/2023  2:25 PM CDT -----  .Type:  Needs Medical Advice    Who Called:pt   Would the patient rather a call back or a response via [facility]? Call back   Best Call Back Number: [phone]  Additional Information: pt states that she needs to talk to someone about referring her to a new doctor.

## 2023-09-26 NOTE — TELEPHONE ENCOUNTER
Patient currently seeing Dr. Worley and requesting to see a new neurologist. Patient stated that she felt she needed some concerns addressed prior to her October appt with Dr. Worley.    I did advise that we could send a new referral elsewhere but cannot promise that she will be able to get an appt anywhere else prior to her current October appt date with Dr. Worley. Patient verbalized understanding. Please advise on new referral. Patient did not have a preferred provider. Thank you

## 2023-09-28 DIAGNOSIS — G40.209 PARTIAL SYMPTOMATIC EPILEPSY WITH COMPLEX PARTIAL SEIZURES, NOT INTRACTABLE, WITHOUT STATUS EPILEPTICUS: ICD-10-CM

## 2023-09-28 RX ORDER — CLONAZEPAM 1 MG/1
0.5 TABLET ORAL NIGHTLY
Qty: 15 TABLET | Refills: 0 | OUTPATIENT
Start: 2023-09-28

## 2023-09-28 NOTE — TELEPHONE ENCOUNTER
Medication: Klonopin 1 mg     Pharmacy: [facility]-[place]    Last Appointment: 02/27/2023    Next Appointment: 10/25/2023     Call back number: [phone]

## 2023-10-02 DIAGNOSIS — G40.209 PARTIAL SYMPTOMATIC EPILEPSY WITH COMPLEX PARTIAL SEIZURES, NOT INTRACTABLE, WITHOUT STATUS EPILEPTICUS: Primary | ICD-10-CM

## 2023-10-02 RX ORDER — CLONAZEPAM 1 MG/1
0.5 TABLET ORAL NIGHTLY
Qty: 15 TABLET | Refills: 5 | Status: SHIPPED | OUTPATIENT
Start: 2023-10-02 | End: 2023-11-30 | Stop reason: SDUPTHER

## 2023-10-02 NOTE — TELEPHONE ENCOUNTER
Medication: Klonopin 1 mg    [facility]     Last Appointment: 02/07/2023    Next Appointment: 10/25/2023     Call back number: [phone]

## 2023-10-03 DIAGNOSIS — R56.9 SEIZURE: Primary | ICD-10-CM

## 2023-10-03 NOTE — TELEPHONE ENCOUNTER
Increase vimpat to 200 mg BID.  Keep appt scheduled in a few weeks.   Can discuss further at that time

## 2023-10-03 NOTE — TELEPHONE ENCOUNTER
Patient was informed to increase Vimpat to 200 mg bid. Informed will be sending in a new prescription for Vimpat 200 mg bid

## 2023-10-03 NOTE — TELEPHONE ENCOUNTER
Dr. Leon's nurse called stating patient is called the office stating she is having having frequent seizures and asked if we can see patient sooner. Informed her I will contact patient regarding seizures.    States seizures are are brought on by not sleeping at night due to tingling to her feet that started 2 weeks ago. States last night she bit her tongue and was kicking her legs, lasting for about 3 minutes. States she did not skip any of her seizure medications. Please advise. [phone]

## 2023-10-04 RX ORDER — LACOSAMIDE 200 MG/1
200 TABLET ORAL EVERY 12 HOURS
Qty: 60 TABLET | Refills: 5 | Status: SHIPPED | OUTPATIENT
Start: 2023-10-04 | End: 2023-12-01

## 2023-10-16 ENCOUNTER — TELEPHONE (OUTPATIENT)
Dept: FAMILY MEDICINE | Facility: CLINIC | Age: 63
End: 2023-10-16
Payer: MEDICARE

## 2023-10-16 DIAGNOSIS — Z12.31 ENCOUNTER FOR SCREENING MAMMOGRAM FOR MALIGNANT NEOPLASM OF BREAST: Primary | ICD-10-CM

## 2023-10-16 NOTE — TELEPHONE ENCOUNTER
----- Message from Zina Marshall sent at 10/16/2023  3:29 PM CDT -----  .Type:  Needs Medical Advice    Who Called: pt   Symptoms (please be specific):    How long has patient had these symptoms:    Pharmacy name and phone #:    Would the patient rather a call back or a response via [facility]?   Best Call Back Number: [phone]  Additional Information: pt is requesting a referral to the breast center on [address] , she took there last year

## 2023-10-25 ENCOUNTER — OFFICE VISIT (OUTPATIENT)
Dept: NEUROLOGY | Facility: CLINIC | Age: 63
End: 2023-10-25
Payer: MEDICARE

## 2023-10-25 VITALS
DIASTOLIC BLOOD PRESSURE: 100 MMHG | BODY MASS INDEX: 45.48 KG/M2 | SYSTOLIC BLOOD PRESSURE: 184 MMHG | HEART RATE: 80 BPM | WEIGHT: 273 LBS | HEIGHT: 65 IN

## 2023-10-25 DIAGNOSIS — G40.909 NONINTRACTABLE EPILEPSY WITHOUT STATUS EPILEPTICUS, UNSPECIFIED EPILEPSY TYPE: ICD-10-CM

## 2023-10-25 DIAGNOSIS — M54.9 DORSALGIA, UNSPECIFIED: Primary | ICD-10-CM

## 2023-10-25 PROCEDURE — 3080F PR MOST RECENT DIASTOLIC BLOOD PRESSURE >= 90 MM HG: ICD-10-PCS | Mod: CPTII,S$GLB,, | Performed by: PSYCHIATRY & NEUROLOGY

## 2023-10-25 PROCEDURE — 4010F PR ACE/ARB THEARPY RXD/TAKEN: ICD-10-PCS | Mod: CPTII,S$GLB,, | Performed by: PSYCHIATRY & NEUROLOGY

## 2023-10-25 PROCEDURE — 3077F SYST BP >= 140 MM HG: CPT | Mod: CPTII,S$GLB,, | Performed by: PSYCHIATRY & NEUROLOGY

## 2023-10-25 PROCEDURE — 3008F BODY MASS INDEX DOCD: CPT | Mod: CPTII,S$GLB,, | Performed by: PSYCHIATRY & NEUROLOGY

## 2023-10-25 PROCEDURE — 4010F ACE/ARB THERAPY RXD/TAKEN: CPT | Mod: CPTII,S$GLB,, | Performed by: PSYCHIATRY & NEUROLOGY

## 2023-10-25 PROCEDURE — 1159F PR MEDICATION LIST DOCUMENTED IN MEDICAL RECORD: ICD-10-PCS | Mod: CPTII,S$GLB,, | Performed by: PSYCHIATRY & NEUROLOGY

## 2023-10-25 PROCEDURE — 3080F DIAST BP >= 90 MM HG: CPT | Mod: CPTII,S$GLB,, | Performed by: PSYCHIATRY & NEUROLOGY

## 2023-10-25 PROCEDURE — 3008F PR BODY MASS INDEX (BMI) DOCUMENTED: ICD-10-PCS | Mod: CPTII,S$GLB,, | Performed by: PSYCHIATRY & NEUROLOGY

## 2023-10-25 PROCEDURE — 99999 PR PBB SHADOW E&M-EST. PATIENT-LVL IV: ICD-10-PCS | Mod: PBBFAC,,, | Performed by: PSYCHIATRY & NEUROLOGY

## 2023-10-25 PROCEDURE — 3077F PR MOST RECENT SYSTOLIC BLOOD PRESSURE >= 140 MM HG: ICD-10-PCS | Mod: CPTII,S$GLB,, | Performed by: PSYCHIATRY & NEUROLOGY

## 2023-10-25 PROCEDURE — 99999 PR PBB SHADOW E&M-EST. PATIENT-LVL IV: CPT | Mod: PBBFAC,,, | Performed by: PSYCHIATRY & NEUROLOGY

## 2023-10-25 PROCEDURE — 99214 PR OFFICE/OUTPT VISIT, EST, LEVL IV, 30-39 MIN: ICD-10-PCS | Mod: S$GLB,,, | Performed by: PSYCHIATRY & NEUROLOGY

## 2023-10-25 PROCEDURE — 99214 OFFICE O/P EST MOD 30 MIN: CPT | Mod: S$GLB,,, | Performed by: PSYCHIATRY & NEUROLOGY

## 2023-10-25 PROCEDURE — 1159F MED LIST DOCD IN RCRD: CPT | Mod: CPTII,S$GLB,, | Performed by: PSYCHIATRY & NEUROLOGY

## 2023-10-25 RX ORDER — GABAPENTIN 300 MG/1
300 CAPSULE ORAL NIGHTLY
Qty: 30 CAPSULE | Refills: 11 | Status: SHIPPED | OUTPATIENT
Start: 2023-10-25 | End: 2024-10-24

## 2023-10-25 NOTE — PROGRESS NOTES
Chief Complaint   Patient presents with    Seizures     Last seizure 10/19/2023 last about 10 minutes  was present also has had seizures during her sleep where she has had bit her tongue         This is a 63 y.o. female here for follow up for seizures.  Since last visit she called with increased frequency of seizures.  Her Vimpat has been increased to 200 twice daily.  He continues to take Topamax 100 twice daily and clonazepam 0.5 at night.  She continues to have increased frequency seizures which she attributes to not sleeping because she is having back pain and tingling in her legs at night.  Seizures are characterized as right leg jerking, staring off followed by postictal sleepiness for about an hour.  Labs done after last visit.  She did try going to a chiropractor for her back but did not alleviate her symptoms.  She feels a restless sensation in her legs at night.  She takes an over-the-counter restless legs syndrome medication.  She is very concerned about weight gain because she wants to be physically active.  She feels like she can not walk long distances are clean up around the house and do chores due to issues with her back and weight gain.  She is worried that her medications caused weight gain.    TSH normal  B12 596    Medication List with Changes/Refills   New Medications    GABAPENTIN (NEURONTIN) 300 MG CAPSULE    Take 1 capsule (300 mg total) by mouth every evening.   Current Medications    ALBUTEROL (PROVENTIL/VENTOLIN HFA) 90 MCG/ACTUATION INHALER    Inhale 2 puffs into the lungs every 6 (six) hours as needed for Wheezing.    BENZONATATE (TESSALON) 200 MG CAPSULE    Take 200 mg by mouth 3 (three) times daily as needed.    CHOLESTYRAMINE (QUESTRAN) 4 GRAM PACKET    Take by mouth.    CLONAZEPAM (KLONOPIN) 1 MG TABLET    Take 0.5 tablets (0.5 mg total) by mouth every evening.    ESOMEPRAZOLE (NEXIUM) 40 MG CAPSULE    Take 40 mg by mouth once daily.    HYDROXYZINE PAMOATE (VISTARIL) 25 MG CAP     TAKE 2 CAPSULES BY MOUTH 2 TIMES A DAY.    LACOSAMIDE (VIMPAT) 200 MG TAB TABLET    Take 1 tablet (200 mg total) by mouth every 12 (twelve) hours.    LATANOPROST 0.005 % OPHTHALMIC SOLUTION    Place 1 drop into both eyes nightly.    LEVOCETIRIZINE (XYZAL) 5 MG TABLET    Take 1 tablet by mouth every evening.    LISINOPRIL (PRINIVIL,ZESTRIL) 40 MG TABLET    Take 1 tablet (40 mg total) by mouth once daily.    MIRABEGRON (MYRBETRIQ) 50 MG TB24    Take 50 mg by mouth.    ONDANSETRON (ZOFRAN-ODT) 8 MG TBDL    Take 8 mg by mouth every 8 (eight) hours as needed.    OPTICHAMBER ADULT MASK-LARGE TEETEE    USE AS DIRECTED WITH SYMBICORT    PANTOPRAZOLE (PROTONIX) 40 MG TABLET    Take 40 mg by mouth once daily.    SYMBICORT 160-4.5 MCG/ACTUATION HFAA    Inhale 2 puffs into the lungs every 12 (twelve) hours.    TOPIRAMATE (TOPAMAX) 100 MG TABLET    TAKE 1 TABLET BY MOUTH TWICE A DAY    VENLAFAXINE (EFFEXOR-XR) 37.5 MG 24 HR CAPSULE    TAKE 1 CAPSULE (37.5 MG TOTAL) BY MOUTH ONCE DAILY. TO TAKE WITH 75MG ONCE DAILY    VENLAFAXINE (EFFEXOR-XR) 75 MG 24 HR CAPSULE    TAKE 1 CAPSULE BY MOUTH EVERY DAY   Discontinued Medications    CLENPIQ 10 MG-3.5 GRAM -12 GRAM/160 ML SOLN    Take by mouth.    FAMOTIDINE (PEPCID) 40 MG TABLET    Take 40 mg by mouth once daily.    FEXOFENADINE (ALLEGRA) 60 MG TABLET    Take 60 mg by mouth.    GARLIC 1,000 MG CAP    Take by mouth.        Vitals:    10/25/23 0957   BP: (!) 184/100   Pulse: 80        General: alert and oriented, no acute distress, no audible wheezes, pulse intact, no edema    Cognition and Comprehension  Speech and language intact  Follows commands  Speech fluent  Attention intact  Memory for recent events intact from history taking  Affect pleasant  Fund of knowledge adequate    Cranial nerves  II. Optic: Visual fields full to confrontation both eyes  III, IV, VI. Oculomotor: Intact, Pupils equal, round and reactive to light, no nystagmus  V. Trigeminal: sensation to light touch  "normal  VII. No facial asymmetry or facial weakness  VIII. Hearing intact to spoken voice  IX/X. Glossopharyngeal/Vagus: Voice normal, palate rises symmetrically  XI. Axillary: Shoulder shrug normal  XII. Hypoglossal: Intact    Muscle Strength and Tone  Normal upper extremity tone  Normal lower extremity tone  Normal upper extremity strength  Normal lower extremity strength    Sensation  Intact to light touch and temperature    Reflexes  Normal and symmetric    Coordination and Gait  Finger to nose normal  Gait normal     1. Dorsalgia, unspecified  -     MRI Lumbar Spine Without Contrast; Future; Expected date: 10/25/2023  -     Ambulatory referral/consult to Pain Clinic  -     gabapentin (NEURONTIN) 300 MG capsule; Take 1 capsule (300 mg total) by mouth every evening.  Dispense: 30 capsule; Refill: 11    2. Nonintractable epilepsy without status epilepticus, unspecified epilepsy type  Overview:  Has had seizures since the age of 13. Initial seizure was in the setting of acute illness with hydronephrosis and continued to have seizures frequently. Used to stare and throw dolls at her sister and did not know they were seizures.  SHe used to have 30-40 seizures a month. Currently seizure frequency is about 1 every 6 months. She is on vimpat 200 BID, topamax 200 BID. She also takes clorazepate, clonazepam 1 BID. She has 2 types of seizures. First type is characterized by starting with "lord sola help me" and she makes sign of cross, then stares, hits her leg, falls to ground if standing lasting for 1-2 minutes. has postictal confusion for 5 minutes to 15 minutes. Has never had a generalized tonic clonic seizure witnessed. Second type is nocturnal. She has had a seizure in her sleep and woke up with tongue bitten. Overall she is very happy with her seizure frequency currently as it is much improved. She does complain of short-term memory problems, which she attributes to the medications.         Need to address lumbago " and neuropathic pain to avoid sleep deprivation that is triggering seizures. Plan MRI L spine and referral for LESI, if we can address pain, seizure frequency may reduce  Cont vimpat 200 BID and top 100 BID

## 2023-11-22 DIAGNOSIS — J45.40 MODERATE PERSISTENT ASTHMA WITHOUT COMPLICATION: ICD-10-CM

## 2023-11-22 RX ORDER — ALBUTEROL SULFATE 90 UG/1
AEROSOL, METERED RESPIRATORY (INHALATION)
Qty: 18 G | Refills: 11 | Status: SHIPPED | OUTPATIENT
Start: 2023-11-22

## 2023-11-29 ENCOUNTER — TELEPHONE (OUTPATIENT)
Dept: NEUROLOGY | Facility: CLINIC | Age: 63
End: 2023-11-29
Payer: MEDICARE

## 2023-11-29 NOTE — TELEPHONE ENCOUNTER
States she thinks she miss a call from [facility] for her MRI  l -spine. Informed I will call them to see if they called her.

## 2023-11-29 NOTE — TELEPHONE ENCOUNTER
----- Message from Dinorah Hodge sent at 2023  1:07 PM CST -----  Regarding: testing questions  23 11:31a TAKEN    To:          Office  From:        Sophie Marcum  Phone:       [phone]  Patient:     Same  :         1960  RegDr:      Dr Indira Worley  Ref:         Calling to find out about a test she was supposed to take     Subject:          Patient Calls  ClrID:    [phone]

## 2023-11-30 ENCOUNTER — TELEPHONE (OUTPATIENT)
Dept: FAMILY MEDICINE | Facility: CLINIC | Age: 63
End: 2023-11-30
Payer: MEDICARE

## 2023-11-30 DIAGNOSIS — G40.209 PARTIAL SYMPTOMATIC EPILEPSY WITH COMPLEX PARTIAL SEIZURES, NOT INTRACTABLE, WITHOUT STATUS EPILEPTICUS: ICD-10-CM

## 2023-11-30 DIAGNOSIS — Z12.31 ENCOUNTER FOR SCREENING MAMMOGRAM FOR MALIGNANT NEOPLASM OF BREAST: Primary | ICD-10-CM

## 2023-11-30 RX ORDER — CLONAZEPAM 1 MG/1
0.5 TABLET ORAL NIGHTLY
Qty: 15 TABLET | Refills: 5 | Status: SHIPPED | OUTPATIENT
Start: 2023-11-30

## 2023-11-30 NOTE — TELEPHONE ENCOUNTER
----- Message from Marsha Moran sent at 11/30/2023  9:56 AM CST -----  Regarding: advice  Type:  Needs Medical Advice    Who Called: pt    Best Call Back Number: [phone]  Additional Information: called about needing to have mammo order  sent [facility] on [facility] . Please advise

## 2023-11-30 NOTE — PROGRESS NOTES
Patient ID: [id number]     Chief Complaint: Medicare AWV (Wellness )      HPI:     Christie Marcum is a 63 y.o. female here today for a Medicare Wellness. No other complaints today.     Very pleasant patient presents to clinic  unaccompanied for her wellness visit.  She is due for labs     States got shingrix #1 4/20/2023 and started having increase in seizures after shot.  Does not plan on getting shingrix #2.  She has epilepsy. Last seizure on thanksgiving day.  Thinks albuterol set it off.  She is followed by neurology, Dr. Worley.  on vimpat, topamax, and klonopin.  Saw her last 10/2023.  Prescribed gabapentin. Ordered MRI lumbar and referred her to pain mgmt. Has appt 5/2024.      Has seasonal allergies and is on allegra.     She has KHANH on CPAP, COPD and emphysema and is prescribed oxygen therapy 24/7. Her pulmonologist was Dr. Terry.  She established with dr. Mortensen.  does not have cpap.  Does not need O2. She thinks patient may have asthma and not copd.  Doing more testing with her. Reports compliance with her inhaler. Saw her last in 3/2023. Has follow up 9/2023     has anxiety/ depression and is on effexor and hydroxyzine.      had lap erin at [facility] 7/26/2021 with dr. lorena cortes. gave norco and pantoprazole and made her seizures worse. once stopped meds, seizures stopped. she has followed up post op with him.      She has a history of cancer of the splenic flexure and surgery and reanastomosis. She is followed by Dr. Gillespie. saw them last 4/2021. took mediport out 6/2021. Previously saw dr. doyle. he no longer takes her insurance. is now seeing dr. dheeraj yepez in [place].  Last colonsocopy with him 6/2021.  only seeing prn. has appt in 2 years for colonoscopy     She has overactive bladder and urinary incontinence. Currently on myrbetriq.  Her urologist is Dr. Flor. has seen him in 2020. not currently on meds for her bladder. not currently seeing him. The patient had  hallucinations with oxybutynin. She tolerated Toviaz however she is having trouble with her insurance paying for this. They also discussed possible Botox injections.     Has hypertension and is compliant with her medication. she is on lisinopril qhs. Needs refill. refuses asa     has glaucoma. [facility]      She is obese.  Has gained weight since lov.     Last pap smear 5/2019 and was normal and HPV negative. She reports a history of partial hysterectomy at age 29. She still has her ovaries. She has had abnormal mammogram on 5/7/19 with subsequent breast biopsy done at [facility] [place] of bilateral breasts which showed fibroadenoma and stromal fibrosis. mmg 7/17/2020 at [facility] was normal. at her visit on 8/24/21 she reported felt lumps in right breast. states did mmg at [facility] 9/2021 which was normal. Mmg 10/2022 at [facility]. Need to request. Dexa done but need to request at [facility] as well.      She is ALLERGIC to Keflex . She is also ALLERGIC to Aleve, codeine, naproxen, tetracycline and Ultram . she reports a positive family history of diabetes in older sister.                  Past Surgical History:   Procedure Laterality Date    COLON SURGERY      COLONOSCOPY      COMPLEX CYSTOMETROGRAM      EMG      HYSTERECTOMY      Left breas biopsy      Right breast biopsy         Review of patient's allergies indicates:   Allergen Reactions    Cephalexin      Other reaction(s): Seizure  hallucinations    Other reaction(s): Unknown    Ciprofloxacin      Other reaction(s): Seiurus  Other reaction(s): Unknown    Codeine      Other reaction(s): Seizure  seizure      Metronidazole      Other reaction(s): Seizure    Metronidazole hcl      Other reaction(s): Unknown    Naproxen      Other reaction(s): Seizure  Nausea/seizure      Pantoprazole sodium      Other reaction(s): Unknown    Tetracycline      Other reaction(s): Seizure  seizure      Tramadol Other (See Comments)     Other reaction(s):  Seizure  SEIZURES HALLUCINATION         Outpatient Medications Marked as Taking for the 12/1/23 encounter (Office Visit) with Mary Leon MD   Medication Sig Dispense Refill    famotidine (PEPCID) 40 MG tablet Take 40 mg by mouth 2 (two) times daily.      lacosamide (VIMPAT) 150 mg Tab tablet Take 150 mg by mouth every 12 (twelve) hours.         Social History     Socioeconomic History    Marital status:    Occupational History    Occupation: unemployed   Tobacco Use    Smoking status: Never    Smokeless tobacco: Never   Substance and Sexual Activity    Alcohol use: Not Currently    Drug use: Never        Family History   Problem Relation Age of Onset    Hypertension Mother     Stroke Mother     Hypertension Father     Stroke Father     Hyperlipidemia Sister     Kidney disease Sister         Patient Care Team:  Mary Leon MD as PCP - General (Family Medicine)  Yamile Mortensen MD (Pulmonary Disease)  Cornelio Waggoner MD as Consulting Physician (Gastroenterology)  Indira Worley MD as Consulting Physician (Neurology)  Tahir Ahmadi MD as Consulting Physician (Oncology)  Mitch Flor MD as Consulting Physician (Urology)       Subjective:     Review of Systems   Constitutional: Negative.    HENT: Negative.     Eyes: Negative.    Respiratory: Negative.     Cardiovascular: Negative.    Gastrointestinal: Negative.    Genitourinary: Negative.    Musculoskeletal: Negative.    Skin: Negative.    Neurological: Negative.    Endo/Heme/Allergies: Negative.    Psychiatric/Behavioral: Negative.           Patient Reported Health Risk Assessment  What is your age?:  (63)  Are you male or female?: Female  During the past four weeks, how much have you been bothered by emotional problems such as feeling anxious, depressed, irritable, sad, or downhearted and blue?: Not at all  During the past five weeks, has your physical and/or emotional health limited your social activities  with family, friends, neighbors, or groups?: Not at all  During the past four weeks, how much bodily pain have you generally had?: Severe pain  During the past four weeks, was someone available to help if you needed and wanted help?: Yes, as much as I wanted  During the past four weeks, what was the hardest physical activity you could do for at least two minutes?: Light  Can you get to places out of walking distance without help?  (For example, can you travel alone on buses or taxis, or drive your own car?): Yes  Can you go shopping for groceries or clothes without someone's help?: Yes  Can you prepare your own meals?: Yes  Can you do your own housework without help?: Yes  Because of any health problems, do you need the help of another person with your personal care needs such as eating, bathing, dressing, or getting around the house?: No  Can you handle your own money without help?: Yes  During the past four weeks, how would you rate your health in general?: Good  How have things been going for you during the past four weeks?: Good and bad parts about equal  Are you having difficulties driving your car?: Not applicable, I do not use a car  Do you always fasten your seat belt when you are in a car?: Yes, usually  How often in the past four weeks have you been bothered by falling or dizzy when standing up?: Never  How often in the past four weeks have you been bothered by sexual problems?: Never  How often in the past four weeks have you been bothered by trouble eating well?: Never  How often in the past four weeks have you been bothered by teeth or denture problems?: Never  How often in the past four weeks have you been bothered with problems using the telephone?: Never  How often in the past four weeks have you been bothered by tiredness or fatigue?: Never  Have you fallen two or more times in the past year?: No  Are you afraid of falling?: No  Are you a smoker?: No  During the past four weeks, how many drinks of  "wine, beer, or other alcoholic beverages did you have?: No alcohol at all  Do you exercise for about 20 minutes three or more days a week?: Yes, most of the time  Have you been given any information to help you with hazards in your house that might hurt you?: No  Have you been given any information to help you with keeping track of your medications?: No  How often do you have trouble taking medicines the way you've been told to take them?: I always take them as prescribed  How confident are you that you can control and manage most of your health problems?: Very confident  What is your race? (Check all that apply.):     Objective:     /78 (BP Location: Left arm)   Pulse 69   Temp 98.4 °F (36.9 °C) (Temporal)   Resp 16   Ht 5' 5" (1.651 m)   Wt 125.6 kg (276 lb 12.8 oz)   LMP  (LMP Unknown)   SpO2 97%   BMI 46.06 kg/m²     Physical Exam  Vitals and nursing note reviewed.   Constitutional:       Appearance: Normal appearance. She is obese.   HENT:      Head: Normocephalic and atraumatic.      Nose: Nose normal.      Mouth/Throat:      Mouth: Mucous membranes are moist.      Pharynx: Oropharynx is clear.   Eyes:      Extraocular Movements: Extraocular movements intact.   Cardiovascular:      Rate and Rhythm: Normal rate and regular rhythm.      Pulses: Normal pulses.      Heart sounds: Normal heart sounds.   Pulmonary:      Effort: Pulmonary effort is normal.      Breath sounds: Normal breath sounds.   Musculoskeletal:         General: Normal range of motion.      Cervical back: Normal range of motion.   Skin:     General: Skin is warm and dry.   Neurological:      General: No focal deficit present.      Mental Status: She is alert and oriented to person, place, and time. Mental status is at baseline.   Psychiatric:         Mood and Affect: Mood normal.              No data to display                  12/1/2023     8:00 AM 10/25/2023     9:30 AM 9/20/2023     7:40 AM 6/5/2023    10:15 AM " 2/7/2023    10:30 AM 12/21/2022     2:00 PM 11/2/2022     3:00 PM   Fall Risk Assessment - Outpatient   Mobility Status Ambulatory Ambulatory Ambulatory Ambulatory Ambulatory Ambulatory Ambulatory   Number of falls 0 0 0 0 0 1 0   Identified as fall risk False False False False False False False           Depression Screening  Over the past two weeks, has the patient felt down, depressed, or hopeless?: No  Over the past two weeks, has the patient felt little interest or pleasure in doing things?: No  Functional Ability/Safety Screening  Was the patient's timed Up & Go test unsteady or longer than 30 seconds?: No  Does the patient need help with phone, transportation, shopping, preparing meals, housework, laundry, meds, or managing money?: No  Does the patient's home have rugs in the hallway, lack grab bars in the bathroom, lack handrails on the stairs or have poor lighting?: No  Have you noticed any hearing difficulties?: No  Cognitive Function (Assessed through direct observation with due consideration of information obtained by way of patient reports and/or concerns raised by family, friends, caretakers, or others)    Does the patient repeat questions/statements in the same day?: No  Does the patient have trouble remembering the date, year, and time?: No  Does the patient have difficulty managing finances?: No  Does the patient have a decreased sense of direction?: No  Assessment/Plan:       Medicare Annual Wellness and Personalized Prevention Plan:   Fall Risk + Home Safety + Hearing Impairment + Depression Screen + Cognitive Impairment Screen + Health Risk Assessment all reviewed.     Opioid Screening: Patient medication list reviewed, patient is not taking prescription opioids. Patient is not using additional opioids than prescribed. Patient is at low risk of substance abuse based on this opioid use history.         Health Maintenance Topics with due status: Not Due       Topic Last Completion Date    TETANUS  VACCINE 09/09/2020    Colorectal Cancer Screening 06/10/2021    Hemoglobin A1c (Diabetic Prevention Screening) 09/13/2022    Lipid Panel 09/13/2022      The patient's Health Maintenance was reviewed and the following appears to be due at this time:   Health Maintenance Due   Topic Date Due    HIV Screening  Never done    COVID-19 Vaccine (5 - 2023-24 season) 09/01/2023    Mammogram  10/21/2023         1. Medicare annual wellness visit, subsequent  Assessment & Plan:  Fasting labs ordered. Will call with results when available  Mammogram at [facility] 10/2022. Need to request record. ordered  DEXA  Ordered for  [facility]  Pap smear  5/2019  Colonoscopy with Dr. Cornelio yepez. Need to request  Declines immunization today    Advanced care planning discussed and paperwork given      Orders:  -     CBC Auto Differential; Future; Expected date: 12/01/2023  -     Comprehensive Metabolic Panel; Future; Expected date: 12/01/2023  -     Lipid Panel; Future; Expected date: 12/01/2023  -     TSH; Future; Expected date: 12/01/2023  -     Hemoglobin A1C; Future; Expected date: 12/01/2023  -     Urinalysis; Future; Expected date: 12/01/2023  -     HIV 1/2 Ag/Ab (4th Gen); Future; Expected date: 12/01/2023    2. Advanced care planning/counseling discussion  Assessment & Plan:  Advanced care planning discussed and paperwork given.    I attest that I have had a face to face discussion with patient and or surrogate decision maker.   Included surrogate decision maker: NO  Advanced directive in chart: NO  LAPOST: NO    Total time spent: 16 minutes      Orders:  -     CBC Auto Differential; Future; Expected date: 12/01/2023  -     Comprehensive Metabolic Panel; Future; Expected date: 12/01/2023  -     Lipid Panel; Future; Expected date: 12/01/2023  -     TSH; Future; Expected date: 12/01/2023  -     Hemoglobin A1C; Future; Expected date: 12/01/2023  -     Urinalysis; Future; Expected date: 12/01/2023  -     HIV 1/2 Ag/Ab (4th Gen);  "Future; Expected date: 12/01/2023    3. Primary hypertension  Assessment & Plan:  Well controlled on current prescription meds.     Orders:  -     CBC Auto Differential; Future; Expected date: 12/01/2023  -     Comprehensive Metabolic Panel; Future; Expected date: 12/01/2023  -     Lipid Panel; Future; Expected date: 12/01/2023  -     TSH; Future; Expected date: 12/01/2023  -     Hemoglobin A1C; Future; Expected date: 12/01/2023  -     Urinalysis; Future; Expected date: 12/01/2023  -     HIV 1/2 Ag/Ab (4th Gen); Future; Expected date: 12/01/2023    4. Nonintractable epilepsy without status epilepticus, unspecified epilepsy type  Overview:  Has had seizures since the age of 13. Initial seizure was in the setting of acute illness with hydronephrosis and continued to have seizures frequently. Used to stare and throw dolls at her sister and did not know they were seizures.  SHe used to have 30-40 seizures a month. Currently seizure frequency is about 1 every 6 months. She is on vimpat 200 BID, topamax 200 BID. She also takes clorazepate, clonazepam 1 BID. She has 2 types of seizures. First type is characterized by starting with "lord sola help me" and she makes sign of cross, then stares, hits her leg, falls to ground if standing lasting for 1-2 minutes. has postictal confusion for 5 minutes to 15 minutes. Has never had a generalized tonic clonic seizure witnessed. Second type is nocturnal. She has had a seizure in her sleep and woke up with tongue bitten. Overall she is very happy with her seizure frequency currently as it is much improved. She does complain of short-term memory problems, which she attributes to the medications.      Assessment & Plan:  Follows with neuro. on klonopin and vimpat per neuro. Reports compliance with other seizure meds. Reports increase in seizure activity after shingrix #1 in 4/2023.     Orders:  -     CBC Auto Differential; Future; Expected date: 12/01/2023  -     Comprehensive " Metabolic Panel; Future; Expected date: 12/01/2023  -     Lipid Panel; Future; Expected date: 12/01/2023  -     TSH; Future; Expected date: 12/01/2023  -     Hemoglobin A1C; Future; Expected date: 12/01/2023  -     Urinalysis; Future; Expected date: 12/01/2023  -     HIV 1/2 Ag/Ab (4th Gen); Future; Expected date: 12/01/2023    5. Seasonal allergies  Assessment & Plan:  On xyzal      Orders:  -     CBC Auto Differential; Future; Expected date: 12/01/2023  -     Comprehensive Metabolic Panel; Future; Expected date: 12/01/2023  -     Lipid Panel; Future; Expected date: 12/01/2023  -     TSH; Future; Expected date: 12/01/2023  -     Hemoglobin A1C; Future; Expected date: 12/01/2023  -     Urinalysis; Future; Expected date: 12/01/2023  -     HIV 1/2 Ag/Ab (4th Gen); Future; Expected date: 12/01/2023  -     Ambulatory referral/consult to ENT; Future; Expected date: 12/08/2023    6. Overactive bladder  Assessment & Plan:  Stable On  Myrbetriq. Keep appts with urology    Orders:  -     CBC Auto Differential; Future; Expected date: 12/01/2023  -     Comprehensive Metabolic Panel; Future; Expected date: 12/01/2023  -     Lipid Panel; Future; Expected date: 12/01/2023  -     TSH; Future; Expected date: 12/01/2023  -     Hemoglobin A1C; Future; Expected date: 12/01/2023  -     Urinalysis; Future; Expected date: 12/01/2023  -     HIV 1/2 Ag/Ab (4th Gen); Future; Expected date: 12/01/2023    7. Breast cancer screening by mammogram  Assessment & Plan:  Done at [facility] 10/2022.  ordered    Orders:  -     Mammo Digital Screening Bilat w/ Tyler; Future; Expected date: 12/01/2023  -     CBC Auto Differential; Future; Expected date: 12/01/2023  -     Comprehensive Metabolic Panel; Future; Expected date: 12/01/2023  -     Lipid Panel; Future; Expected date: 12/01/2023  -     TSH; Future; Expected date: 12/01/2023  -     Hemoglobin A1C; Future; Expected date: 12/01/2023  -     Urinalysis; Future; Expected date: 12/01/2023  -     HIV 1/2  Ag/Ab (4th Gen); Future; Expected date: 12/01/2023    8. Postmenopausal  Assessment & Plan:  Need to request dexa from [facility]. dexa ordered    Orders:  -     DXA Bone Density Axial Skeleton 1 or more sites; Future; Expected date: 12/01/2023  -     CBC Auto Differential; Future; Expected date: 12/01/2023  -     Comprehensive Metabolic Panel; Future; Expected date: 12/01/2023  -     Lipid Panel; Future; Expected date: 12/01/2023  -     TSH; Future; Expected date: 12/01/2023  -     Hemoglobin A1C; Future; Expected date: 12/01/2023  -     Urinalysis; Future; Expected date: 12/01/2023  -     HIV 1/2 Ag/Ab (4th Gen); Future; Expected date: 12/01/2023    9. Cancer of splenic flexure  Assessment & Plan:  Keep appts with GI and oncology    Orders:  -     CBC Auto Differential; Future; Expected date: 12/01/2023  -     Comprehensive Metabolic Panel; Future; Expected date: 12/01/2023  -     Lipid Panel; Future; Expected date: 12/01/2023  -     TSH; Future; Expected date: 12/01/2023  -     Hemoglobin A1C; Future; Expected date: 12/01/2023  -     Urinalysis; Future; Expected date: 12/01/2023  -     HIV 1/2 Ag/Ab (4th Gen); Future; Expected date: 12/01/2023    10. Gastroesophageal reflux disease, unspecified whether esophagitis present  Assessment & Plan:  Stable on ppi and pepcid prescriptions. Keep appts with GI    Orders:  -     CBC Auto Differential; Future; Expected date: 12/01/2023  -     Comprehensive Metabolic Panel; Future; Expected date: 12/01/2023  -     Lipid Panel; Future; Expected date: 12/01/2023  -     TSH; Future; Expected date: 12/01/2023  -     Hemoglobin A1C; Future; Expected date: 12/01/2023  -     Urinalysis; Future; Expected date: 12/01/2023  -     HIV 1/2 Ag/Ab (4th Gen); Future; Expected date: 12/01/2023    11. Class 3 severe obesity due to excess calories with serious comorbidity and body mass index (BMI) of 45.0 to 49.9 in adult  Assessment & Plan:  Encourage lifestyle change    Orders:  -     CBC Auto  Differential; Future; Expected date: 12/01/2023  -     Comprehensive Metabolic Panel; Future; Expected date: 12/01/2023  -     Lipid Panel; Future; Expected date: 12/01/2023  -     TSH; Future; Expected date: 12/01/2023  -     Hemoglobin A1C; Future; Expected date: 12/01/2023  -     Urinalysis; Future; Expected date: 12/01/2023  -     HIV 1/2 Ag/Ab (4th Gen); Future; Expected date: 12/01/2023    12. Abnormal finding of blood chemistry, unspecified  -     Hemoglobin A1C; Future; Expected date: 12/01/2023    13. Anxiety  Assessment & Plan:  Stable on effexor and hydroxyzine. On clonzepam per neuro.  Contact clinic for concerns.       14. Moderate persistent asthma without complication  Assessment & Plan:  Keep appts with pulm.  Reports compliance with her inhalers. Not needing O2 per patient.        15. KHANH on CPAP  Assessment & Plan:  States does not have cpap.  Will reach out to dr. Mortensen office to help facilitate getting machine               Advance Care Planning   I attest to discussing Advance Care Planning with patient and/or family member.  Education was provided including the importance of the Health Care Power of , Advance Directives, and/or [place] documentation.  The patient expressed understanding to the importance of this information and discussion.  Length of ACP conversation in minutes: 16         Medication List with Changes/Refills   Current Medications    ALBUTEROL (PROVENTIL/VENTOLIN HFA) 90 MCG/ACTUATION INHALER    INHALE 2 PUFFS INTO THE LUNGS EVERY 6 HOURS AS NEEDED FOR WHEEZE       Start Date: 11/22/2023End Date: --    BENZONATATE (TESSALON) 200 MG CAPSULE    Take 200 mg by mouth 3 (three) times daily as needed.       Start Date: 8/16/2022 End Date: --    CHOLESTYRAMINE (QUESTRAN) 4 GRAM PACKET    Take by mouth.       Start Date: 10/26/2022End Date: --    CLONAZEPAM (KLONOPIN) 1 MG TABLET    Take 0.5 tablets (0.5 mg total) by mouth every evening.       Start Date: 11/30/2023End Date: --     ESOMEPRAZOLE (NEXIUM) 40 MG CAPSULE    Take 40 mg by mouth once daily.       Start Date: 9/1/2022  End Date: --    FAMOTIDINE (PEPCID) 40 MG TABLET    Take 40 mg by mouth 2 (two) times daily.       Start Date: 10/25/2023End Date: --    FLUTICASONE PROPIONATE (FLONASE) 50 MCG/ACTUATION NASAL SPRAY    1 spray by Each Nostril route.       Start Date: --        End Date: --    GABAPENTIN (NEURONTIN) 300 MG CAPSULE    Take 1 capsule (300 mg total) by mouth every evening.       Start Date: 10/25/2023End Date: 10/24/2024    HYDROXYZINE PAMOATE (VISTARIL) 25 MG CAP    TAKE 2 CAPSULES BY MOUTH 2 TIMES A DAY.       Start Date: 8/9/2023  End Date: --    LACOSAMIDE (VIMPAT) 150 MG TAB TABLET    Take 150 mg by mouth every 12 (twelve) hours.       Start Date: 11/13/2023End Date: --    LATANOPROST 0.005 % OPHTHALMIC SOLUTION    Place 1 drop into both eyes nightly.       Start Date: 5/5/2022  End Date: --    LEVOCETIRIZINE (XYZAL) 5 MG TABLET    Take 1 tablet by mouth every evening.       Start Date: --        End Date: --    LISINOPRIL (PRINIVIL,ZESTRIL) 40 MG TABLET    Take 1 tablet (40 mg total) by mouth once daily.       Start Date: 6/5/2023  End Date: 6/4/2024    MIRABEGRON (MYRBETRIQ) 50 MG TB24    Take 50 mg by mouth.       Start Date: --        End Date: --    ONDANSETRON (ZOFRAN-ODT) 8 MG TBDL    Take 8 mg by mouth every 8 (eight) hours as needed.       Start Date: 5/17/2023 End Date: --    OPTICHAMBER ADULT MASK-LARGE TEETEE    USE AS DIRECTED WITH SYMBICORT       Start Date: 9/20/2022 End Date: --    PANTOPRAZOLE (PROTONIX) 40 MG TABLET    Take 40 mg by mouth once daily.       Start Date: 7/18/2022 End Date: --    SYMBICORT 160-4.5 MCG/ACTUATION HFAA    Inhale 2 puffs into the lungs every 12 (twelve) hours.       Start Date: 9/20/2023 End Date: --    VENLAFAXINE (EFFEXOR-XR) 37.5 MG 24 HR CAPSULE    TAKE 1 CAPSULE (37.5 MG TOTAL) BY MOUTH ONCE DAILY. TO TAKE WITH 75MG ONCE DAILY       Start Date: 7/17/2023 End Date:  7/16/2024    VENLAFAXINE (EFFEXOR-XR) 75 MG 24 HR CAPSULE    TAKE 1 CAPSULE BY MOUTH EVERY DAY       Start Date: 6/5/2023  End Date: --   Discontinued Medications    LACOSAMIDE (VIMPAT) 200 MG TAB TABLET    Take 1 tablet (200 mg total) by mouth every 12 (twelve) hours.       Start Date: 10/4/2023 End Date: 12/1/2023    TOPIRAMATE (TOPAMAX) 100 MG TABLET    TAKE 1 TABLET BY MOUTH TWICE A DAY       Start Date: 7/17/2023 End Date: 12/1/2023        Follow up in about 1 year (around 12/1/2024) for Medicare Wellness with labs. In addition to their scheduled follow up, the patient has also been instructed to follow up on as needed basis.

## 2023-12-01 ENCOUNTER — TELEPHONE (OUTPATIENT)
Dept: FAMILY MEDICINE | Facility: CLINIC | Age: 63
End: 2023-12-01

## 2023-12-01 ENCOUNTER — OFFICE VISIT (OUTPATIENT)
Dept: FAMILY MEDICINE | Facility: CLINIC | Age: 63
End: 2023-12-01
Payer: MEDICARE

## 2023-12-01 VITALS
TEMPERATURE: 98 F | HEART RATE: 69 BPM | SYSTOLIC BLOOD PRESSURE: 134 MMHG | HEIGHT: 65 IN | RESPIRATION RATE: 16 BRPM | BODY MASS INDEX: 46.12 KG/M2 | WEIGHT: 276.81 LBS | OXYGEN SATURATION: 97 % | DIASTOLIC BLOOD PRESSURE: 78 MMHG

## 2023-12-01 DIAGNOSIS — G47.33 OSA ON CPAP: ICD-10-CM

## 2023-12-01 DIAGNOSIS — Z78.0 POSTMENOPAUSAL: ICD-10-CM

## 2023-12-01 DIAGNOSIS — R79.9 ABNORMAL FINDING OF BLOOD CHEMISTRY, UNSPECIFIED: ICD-10-CM

## 2023-12-01 DIAGNOSIS — F41.9 ANXIETY: ICD-10-CM

## 2023-12-01 DIAGNOSIS — G40.909 NONINTRACTABLE EPILEPSY WITHOUT STATUS EPILEPTICUS, UNSPECIFIED EPILEPSY TYPE: ICD-10-CM

## 2023-12-01 DIAGNOSIS — C18.5 CANCER OF SPLENIC FLEXURE: ICD-10-CM

## 2023-12-01 DIAGNOSIS — Z00.00 MEDICARE ANNUAL WELLNESS VISIT, SUBSEQUENT: Primary | ICD-10-CM

## 2023-12-01 DIAGNOSIS — J45.40 MODERATE PERSISTENT ASTHMA WITHOUT COMPLICATION: ICD-10-CM

## 2023-12-01 DIAGNOSIS — N32.81 OVERACTIVE BLADDER: ICD-10-CM

## 2023-12-01 DIAGNOSIS — E66.01 CLASS 3 SEVERE OBESITY DUE TO EXCESS CALORIES WITH SERIOUS COMORBIDITY AND BODY MASS INDEX (BMI) OF 45.0 TO 49.9 IN ADULT: ICD-10-CM

## 2023-12-01 DIAGNOSIS — Z71.89 ADVANCED CARE PLANNING/COUNSELING DISCUSSION: ICD-10-CM

## 2023-12-01 DIAGNOSIS — Z12.31 BREAST CANCER SCREENING BY MAMMOGRAM: ICD-10-CM

## 2023-12-01 DIAGNOSIS — I10 PRIMARY HYPERTENSION: ICD-10-CM

## 2023-12-01 DIAGNOSIS — K21.9 GASTROESOPHAGEAL REFLUX DISEASE, UNSPECIFIED WHETHER ESOPHAGITIS PRESENT: ICD-10-CM

## 2023-12-01 DIAGNOSIS — J30.2 SEASONAL ALLERGIES: ICD-10-CM

## 2023-12-01 PROBLEM — Z99.81 ON HOME OXYGEN THERAPY: Status: RESOLVED | Noted: 2022-09-21 | Resolved: 2023-12-01

## 2023-12-01 PROCEDURE — 3078F PR MOST RECENT DIASTOLIC BLOOD PRESSURE < 80 MM HG: ICD-10-PCS | Mod: CPTII,,, | Performed by: FAMILY MEDICINE

## 2023-12-01 PROCEDURE — 1159F PR MEDICATION LIST DOCUMENTED IN MEDICAL RECORD: ICD-10-PCS | Mod: CPTII,,, | Performed by: FAMILY MEDICINE

## 2023-12-01 PROCEDURE — 3078F DIAST BP <80 MM HG: CPT | Mod: CPTII,,, | Performed by: FAMILY MEDICINE

## 2023-12-01 PROCEDURE — 1160F RVW MEDS BY RX/DR IN RCRD: CPT | Mod: CPTII,,, | Performed by: FAMILY MEDICINE

## 2023-12-01 PROCEDURE — 4010F ACE/ARB THERAPY RXD/TAKEN: CPT | Mod: CPTII,,, | Performed by: FAMILY MEDICINE

## 2023-12-01 PROCEDURE — 1159F MED LIST DOCD IN RCRD: CPT | Mod: CPTII,,, | Performed by: FAMILY MEDICINE

## 2023-12-01 PROCEDURE — 3075F SYST BP GE 130 - 139MM HG: CPT | Mod: CPTII,,, | Performed by: FAMILY MEDICINE

## 2023-12-01 PROCEDURE — 1160F PR REVIEW ALL MEDS BY PRESCRIBER/CLIN PHARMACIST DOCUMENTED: ICD-10-PCS | Mod: CPTII,,, | Performed by: FAMILY MEDICINE

## 2023-12-01 PROCEDURE — G0439 PR MEDICARE ANNUAL WELLNESS SUBSEQUENT VISIT: ICD-10-PCS | Mod: ,,, | Performed by: FAMILY MEDICINE

## 2023-12-01 PROCEDURE — 4010F PR ACE/ARB THEARPY RXD/TAKEN: ICD-10-PCS | Mod: CPTII,,, | Performed by: FAMILY MEDICINE

## 2023-12-01 PROCEDURE — 3075F PR MOST RECENT SYSTOLIC BLOOD PRESS GE 130-139MM HG: ICD-10-PCS | Mod: CPTII,,, | Performed by: FAMILY MEDICINE

## 2023-12-01 PROCEDURE — G0439 PPPS, SUBSEQ VISIT: HCPCS | Mod: ,,, | Performed by: FAMILY MEDICINE

## 2023-12-01 RX ORDER — FAMOTIDINE 40 MG/1
40 TABLET, FILM COATED ORAL 2 TIMES DAILY
COMMUNITY
Start: 2023-10-25

## 2023-12-01 RX ORDER — LACOSAMIDE 150 MG/1
150 TABLET ORAL EVERY 12 HOURS
COMMUNITY
Start: 2023-11-13 | End: 2023-12-27

## 2023-12-01 RX ORDER — FLUTICASONE PROPIONATE 50 MCG
1 SPRAY, SUSPENSION (ML) NASAL
COMMUNITY

## 2023-12-01 NOTE — ASSESSMENT & PLAN NOTE
Follows with neuro. on klonopin and vimpat per neuro. Reports compliance with other seizure meds. Reports increase in seizure activity after shingrix #1 in 4/2023.

## 2023-12-01 NOTE — ASSESSMENT & PLAN NOTE
Fasting labs ordered. Will call with results when available  Mammogram at [facility] breast [place] 10/2022. Need to request record. ordered  DEXA  Ordered for  [facility]  Pap smear  5/2019  Colonoscopy with Dr. Cornelio yepez. Need to request  Declines immunization today    Advanced care planning discussed and paperwork given

## 2023-12-01 NOTE — TELEPHONE ENCOUNTER
Patient is seeing dr. Itty- pulm.  States she is out on maternity leave.  Still does not have her cpap.  Can we reach out to her office to help facilitate for patient? thanks

## 2023-12-01 NOTE — ASSESSMENT & PLAN NOTE
States does not have cpap.  Will reach out to dr. Mortensen office to help facilitate getting machine

## 2023-12-04 ENCOUNTER — TELEPHONE (OUTPATIENT)
Dept: NEUROLOGY | Facility: CLINIC | Age: 63
End: 2023-12-04
Payer: MEDICARE

## 2023-12-04 NOTE — TELEPHONE ENCOUNTER
----- Message from Indira Worley MD sent at 12/4/2023  3:39 PM CST -----  MRI L  spine shows several disc bulges in lower back that could cause pain, At last visit we sent referral to pain clinic for LESI.

## 2023-12-04 NOTE — TELEPHONE ENCOUNTER
LVM for nurse to return call. Also included patient name  and call back number for nurse to reach out to patient to follow up

## 2023-12-05 ENCOUNTER — TELEPHONE (OUTPATIENT)
Dept: NEUROLOGY | Facility: CLINIC | Age: 63
End: 2023-12-05
Payer: MEDICARE

## 2023-12-07 ENCOUNTER — TELEPHONE (OUTPATIENT)
Dept: NEUROLOGY | Facility: CLINIC | Age: 63
End: 2023-12-07
Payer: MEDICARE

## 2023-12-16 LAB
ALBUMIN SERPL-MCNC: 4.1 G/DL (ref 3.9–4.9)
ALBUMIN/GLOB SERPL: 1.2 {RATIO} (ref 1.2–2.2)
ALP SERPL-CCNC: 76 IU/L (ref 44–121)
ALT SERPL-CCNC: 26 IU/L (ref 0–32)
APPEARANCE UR: ABNORMAL
AST SERPL-CCNC: 23 IU/L (ref 0–40)
BACTERIA #/AREA URNS HPF: ABNORMAL /[HPF]
BASOPHILS # BLD AUTO: 0 X10E3/UL (ref 0–0.2)
BASOPHILS NFR BLD AUTO: 0 %
BILIRUB SERPL-MCNC: 0.4 MG/DL (ref 0–1.2)
BILIRUB UR QL STRIP: NEGATIVE
BUN SERPL-MCNC: 14 MG/DL (ref 8–27)
BUN/CREAT SERPL: 13 (ref 12–28)
CALCIUM SERPL-MCNC: 9.5 MG/DL (ref 8.7–10.3)
CHLORIDE SERPL-SCNC: 106 MMOL/L (ref 96–106)
CHOLEST SERPL-MCNC: 189 MG/DL (ref 100–199)
CO2 SERPL-SCNC: 18 MMOL/L (ref 20–29)
COLOR UR: YELLOW
CREAT SERPL-MCNC: 1.11 MG/DL (ref 0.57–1)
CRYSTALS URNS MICRO: ABNORMAL
EOSINOPHIL # BLD AUTO: 0.2 X10E3/UL (ref 0–0.4)
EOSINOPHIL NFR BLD AUTO: 4 %
EPI CELLS #/AREA URNS HPF: >10 /HPF (ref 0–10)
ERYTHROCYTE [DISTWIDTH] IN BLOOD BY AUTOMATED COUNT: 13.1 % (ref 11.7–15.4)
EST. GFR  (NO RACE VARIABLE): 56 ML/MIN/1.73
GLOBULIN SER CALC-MCNC: 3.3 G/DL (ref 1.5–4.5)
GLUCOSE SERPL-MCNC: 122 MG/DL (ref 70–99)
GLUCOSE UR QL STRIP: NEGATIVE
HBA1C MFR BLD: 6.2 % (ref 4.8–5.6)
HCT VFR BLD AUTO: 44.6 % (ref 34–46.6)
HDLC SERPL-MCNC: 50 MG/DL
HGB BLD-MCNC: 14.1 G/DL (ref 11.1–15.9)
HGB UR QL STRIP: NEGATIVE
HIV 1+2 AB+HIV1 P24 AG SERPL QL IA: NON REACTIVE
IMM GRANULOCYTES NFR BLD AUTO: 0 %
KETONES UR QL STRIP: NEGATIVE
LDLC SERPL CALC-MCNC: 123 MG/DL (ref 0–99)
LEUKOCYTE ESTERASE UR QL STRIP: ABNORMAL
LYMPHOCYTES # BLD AUTO: 0.9 X10E3/UL (ref 0.7–3.1)
LYMPHOCYTES NFR BLD AUTO: 16 %
MCH RBC QN AUTO: 29.1 PG (ref 26.6–33)
MCHC RBC AUTO-ENTMCNC: 31.6 G/DL (ref 31.5–35.7)
MCV RBC AUTO: 92 FL (ref 79–97)
MICRO URNS: ABNORMAL
MONOCYTES # BLD AUTO: 0.5 X10E3/UL (ref 0.1–0.9)
MONOCYTES NFR BLD AUTO: 9 %
MUCOUS THREADS URNS QL MICRO: PRESENT
NEUTROPHILS # BLD AUTO: 4 X10E3/UL (ref 1.4–7)
NEUTROPHILS NFR BLD AUTO: 71 %
NITRITE UR QL STRIP: NEGATIVE
PH UR STRIP: 6 [PH] (ref 5–7.5)
PLATELET # BLD AUTO: 228 X10E3/UL (ref 150–450)
POTASSIUM SERPL-SCNC: 3.9 MMOL/L (ref 3.5–5.2)
PROT SERPL-MCNC: 7.4 G/DL (ref 6–8.5)
PROT UR QL STRIP: ABNORMAL
RBC # BLD AUTO: 4.85 X10E6/UL (ref 3.77–5.28)
RBC #/AREA URNS HPF: ABNORMAL /HPF (ref 0–2)
SODIUM SERPL-SCNC: 140 MMOL/L (ref 134–144)
SP GR UR STRIP: 1.02 (ref 1–1.03)
TRIGL SERPL-MCNC: 90 MG/DL (ref 0–149)
TSH SERPL DL<=0.005 MIU/L-ACNC: 3.48 UIU/ML (ref 0.45–4.5)
UNIDENT CRYS URNS QL MICRO: PRESENT
UROBILINOGEN UR STRIP-MCNC: 1 MG/DL (ref 0.2–1)
VLDLC SERPL CALC-MCNC: 16 MG/DL (ref 5–40)
WBC # BLD AUTO: 5.7 X10E3/UL (ref 3.4–10.8)
WBC #/AREA URNS HPF: ABNORMAL /HPF (ref 0–5)

## 2023-12-22 DIAGNOSIS — J45.40 MODERATE PERSISTENT ASTHMA WITHOUT COMPLICATION: ICD-10-CM

## 2023-12-22 RX ORDER — BUDESONIDE AND FORMOTEROL FUMARATE DIHYDRATE 160; 4.5 UG/1; UG/1
2 AEROSOL RESPIRATORY (INHALATION) EVERY 12 HOURS
Qty: 6 G | Refills: 3 | Status: SHIPPED | OUTPATIENT
Start: 2023-12-22

## 2023-12-27 ENCOUNTER — OFFICE VISIT (OUTPATIENT)
Dept: FAMILY MEDICINE | Facility: CLINIC | Age: 63
End: 2023-12-27
Payer: MEDICARE

## 2023-12-27 VITALS
OXYGEN SATURATION: 96 % | DIASTOLIC BLOOD PRESSURE: 84 MMHG | HEART RATE: 78 BPM | SYSTOLIC BLOOD PRESSURE: 144 MMHG | RESPIRATION RATE: 16 BRPM | WEIGHT: 277.31 LBS | BODY MASS INDEX: 46.2 KG/M2 | HEIGHT: 65 IN | TEMPERATURE: 99 F

## 2023-12-27 DIAGNOSIS — N18.31 CHRONIC KIDNEY DISEASE, STAGE 3A: ICD-10-CM

## 2023-12-27 DIAGNOSIS — G47.33 OSA ON CPAP: ICD-10-CM

## 2023-12-27 DIAGNOSIS — I10 PRIMARY HYPERTENSION: Primary | ICD-10-CM

## 2023-12-27 DIAGNOSIS — E66.01 CLASS 3 SEVERE OBESITY DUE TO EXCESS CALORIES WITH SERIOUS COMORBIDITY AND BODY MASS INDEX (BMI) OF 45.0 TO 49.9 IN ADULT: ICD-10-CM

## 2023-12-27 PROCEDURE — 1160F PR REVIEW ALL MEDS BY PRESCRIBER/CLIN PHARMACIST DOCUMENTED: ICD-10-PCS | Mod: CPTII,,, | Performed by: FAMILY MEDICINE

## 2023-12-27 PROCEDURE — 3077F PR MOST RECENT SYSTOLIC BLOOD PRESSURE >= 140 MM HG: ICD-10-PCS | Mod: CPTII,,, | Performed by: FAMILY MEDICINE

## 2023-12-27 PROCEDURE — 3077F SYST BP >= 140 MM HG: CPT | Mod: CPTII,,, | Performed by: FAMILY MEDICINE

## 2023-12-27 PROCEDURE — 4010F ACE/ARB THERAPY RXD/TAKEN: CPT | Mod: CPTII,,, | Performed by: FAMILY MEDICINE

## 2023-12-27 PROCEDURE — 99213 OFFICE O/P EST LOW 20 MIN: CPT | Mod: ,,, | Performed by: FAMILY MEDICINE

## 2023-12-27 PROCEDURE — 1159F PR MEDICATION LIST DOCUMENTED IN MEDICAL RECORD: ICD-10-PCS | Mod: CPTII,,, | Performed by: FAMILY MEDICINE

## 2023-12-27 PROCEDURE — 3008F PR BODY MASS INDEX (BMI) DOCUMENTED: ICD-10-PCS | Mod: CPTII,,, | Performed by: FAMILY MEDICINE

## 2023-12-27 PROCEDURE — 3079F PR MOST RECENT DIASTOLIC BLOOD PRESSURE 80-89 MM HG: ICD-10-PCS | Mod: CPTII,,, | Performed by: FAMILY MEDICINE

## 2023-12-27 PROCEDURE — 3079F DIAST BP 80-89 MM HG: CPT | Mod: CPTII,,, | Performed by: FAMILY MEDICINE

## 2023-12-27 PROCEDURE — 3008F BODY MASS INDEX DOCD: CPT | Mod: CPTII,,, | Performed by: FAMILY MEDICINE

## 2023-12-27 PROCEDURE — 3044F PR MOST RECENT HEMOGLOBIN A1C LEVEL <7.0%: ICD-10-PCS | Mod: CPTII,,, | Performed by: FAMILY MEDICINE

## 2023-12-27 PROCEDURE — 1159F MED LIST DOCD IN RCRD: CPT | Mod: CPTII,,, | Performed by: FAMILY MEDICINE

## 2023-12-27 PROCEDURE — 99213 PR OFFICE/OUTPT VISIT, EST, LEVL III, 20-29 MIN: ICD-10-PCS | Mod: ,,, | Performed by: FAMILY MEDICINE

## 2023-12-27 PROCEDURE — 3044F HG A1C LEVEL LT 7.0%: CPT | Mod: CPTII,,, | Performed by: FAMILY MEDICINE

## 2023-12-27 PROCEDURE — 1160F RVW MEDS BY RX/DR IN RCRD: CPT | Mod: CPTII,,, | Performed by: FAMILY MEDICINE

## 2023-12-27 PROCEDURE — 4010F PR ACE/ARB THEARPY RXD/TAKEN: ICD-10-PCS | Mod: CPTII,,, | Performed by: FAMILY MEDICINE

## 2023-12-27 RX ORDER — LACOSAMIDE 200 MG/1
200 TABLET ORAL EVERY 12 HOURS
COMMUNITY
Start: 2023-12-03

## 2023-12-27 RX ORDER — LISINOPRIL 40 MG/1
40 TABLET ORAL DAILY
Qty: 90 TABLET | Refills: 3 | Status: SHIPPED | OUTPATIENT
Start: 2023-12-27 | End: 2024-12-26

## 2023-12-27 NOTE — ASSESSMENT & PLAN NOTE
Initial reading elevated. Repeat improved to near goal. Monitor bp closely. Continue on current prescription meds. Contact clinic for concerns. Patient is agreeable to plan and verbalized understanding

## 2023-12-27 NOTE — PROGRESS NOTES
Subjective:        Patient ID: Christie Marcum is a 63 y.o. female.    Chief Complaint: Follow-up (Sleep apnea follow up )      Very pleasant patient presents to clinic unaccompanied for khanh follow up. She is due for her wellness visit in December    She has KHANH on CPAP, COPD and emphysema and is prescribed oxygen therapy 24/7. Her pulmonologist was Dr. Terry.  She established with dr. Mortensen now.  does not have cpap machine. needs.  Does not need O2. She thinks patient may have asthma and not copd.  Doing more testing with her. Reports compliance with her inhaler. Saw her last in 3/2023. Has follow up 9/2023.  Has not been able to get in touch with her office. She may be on maternity leave. Thinks this may be issue with her [employer].  Will place referral to neuro for khanh eval and to prescribe cpap.  She is snoring and spouse has reported she stops breathing.     Has hypertension and is compliant with her medication. she is on lisinopril qhs.  refuses asa             States got shingrix #1 4/20/2023 and started having increase in seizures after shot.  Does not plan on getting shingrix #2.    She has epilepsy. Last seizure on thanksgiving day.  Thinks albuterol set it off.  She is followed by neurology, Dr. Worley.  on vimpat, topamax, and klonopin.  Saw her last 10/2023.  Prescribed gabapentin. Ordered MRI lumbar and referred her to pain mgmt. Has appt 5/2024.      Has seasonal allergies and is on allegra.      has anxiety/ depression and is on effexor and hydroxyzine.      had lap erin at [facility] 7/26/2021 with dr. lorena cortes. gave norco and pantoprazole and made her seizures worse. once stopped meds, seizures stopped. she has followed up post op with him.      She has a history of cancer of the splenic flexure and surgery and reanastomosis. She is followed by Dr. Gillespie. saw them last 4/2021. took mediport out 6/2021. Previously saw dr. doyle. he no longer takes her insurance. is now seeing  dr. dheeraj yepez in [place].  Last colonsocopy with him 6/2021.  only seeing prn. has appt in 2 years for colonoscopy     She has overactive bladder and urinary incontinence. Currently on myrbetriq.  Her urologist is Dr. Flor. has seen him in 2020. not currently on meds for her bladder. not currently seeing him. The patient had hallucinations with oxybutynin. She tolerated Toviaz however she is having trouble with her insurance paying for this. They also discussed possible Botox injections.  She has ckd3.  Recent gfr is improved. Encourage fluids. Monitor.      has glaucoma. on drops. dr. perry at [facility] office.      She is obese.  Has gained weight since [facility].     Last pap smear 5/2019 and was normal and HPV negative. She reports a history of partial hysterectomy at age 29. She still has her ovaries. She has had abnormal mammogram on 5/7/19 with subsequent breast biopsy done at [facility] of bilateral breasts which showed fibroadenoma and stromal fibrosis. mmg 7/17/2020 at [facility] was normal. at her visit on 8/24/21 she reported felt lumps in right breast. states did mmg at [facility] 9/2021 which was normal. Mmg 10/2022 at [facility]. Need to request. Dexa done but need to request at [facility] as well.      She is ALLERGIC to Keflex . She is also ALLERGIC to Aleve, codeine, naproxen, tetracycline and Ultram . she reports a positive family history of diabetes in older sister.           Review of Systems   Constitutional: Negative.    HENT: Negative.     Respiratory: Negative.     Cardiovascular: Negative.    Gastrointestinal: Negative.    Genitourinary: Negative.          Review of patient's allergies indicates:   Allergen Reactions    Cephalexin      Other reaction(s): Seizure  hallucinations    Other reaction(s): Unknown    Ciprofloxacin      Other reaction(s): Seiurus  Other reaction(s): Unknown    Codeine      Other reaction(s): Seizure  seizure      Metronidazole      Other reaction(s): Seizure    Metronidazole hcl   "    Other reaction(s): Unknown    Naproxen      Other reaction(s): Seizure  Nausea/seizure      Pantoprazole sodium      Other reaction(s): Unknown    Tetracycline      Other reaction(s): Seizure  seizure      Tramadol Other (See Comments)     Other reaction(s): Seizure  SEIZURES HALLUCINATION        Vitals:    12/27/23 1433 12/27/23 1507   BP: (!) 149/85 (!) 144/84   BP Location: Right arm    Pulse: 78    Resp: 16    Temp: 98.7 °F (37.1 °C)    TempSrc: Temporal    SpO2: 96%    Weight: 125.8 kg (277 lb 4.8 oz)    Height: 5' 5" (1.651 m)       Social History     Socioeconomic History    Marital status:    Occupational History    Occupation: unemployed   Tobacco Use    Smoking status: Never    Smokeless tobacco: Never   Substance and Sexual Activity    Alcohol use: Not Currently    Drug use: Never      Family History   Problem Relation Age of Onset    Hypertension Mother     Stroke Mother     Hypertension Father     Stroke Father     Hyperlipidemia Sister     Kidney disease Sister           Objective:     Physical Exam  Vitals and nursing note reviewed.   Constitutional:       Appearance: Normal appearance. She is obese.   HENT:      Head: Normocephalic and atraumatic.      Nose: Nose normal.      Mouth/Throat:      Mouth: Mucous membranes are moist.      Pharynx: Oropharynx is clear.   Eyes:      Extraocular Movements: Extraocular movements intact.   Cardiovascular:      Rate and Rhythm: Normal rate and regular rhythm.      Pulses: Normal pulses.      Heart sounds: Normal heart sounds.   Pulmonary:      Effort: Pulmonary effort is normal.      Breath sounds: Normal breath sounds.   Musculoskeletal:         General: Normal range of motion.      Cervical back: Normal range of motion.   Skin:     General: Skin is warm and dry.   Neurological:      General: No focal deficit present.      Mental Status: She is alert and oriented to person, place, and time. Mental status is at baseline.   Psychiatric:         Mood " and Affect: Mood normal.       Current Outpatient Medications on File Prior to Visit   Medication Sig Dispense Refill    lacosamide (VIMPAT) 200 mg Tab tablet Take 200 mg by mouth every 12 (twelve) hours.      albuterol (PROVENTIL/VENTOLIN HFA) 90 mcg/actuation inhaler INHALE 2 PUFFS INTO THE LUNGS EVERY 6 HOURS AS NEEDED FOR WHEEZE 18 g 11    benzonatate (TESSALON) 200 MG capsule Take 200 mg by mouth 3 (three) times daily as needed.      cholestyramine (QUESTRAN) 4 gram packet Take by mouth.      clonazePAM (KLONOPIN) 1 MG tablet Take 0.5 tablets (0.5 mg total) by mouth every evening. 15 tablet 5    esomeprazole (NEXIUM) 40 MG capsule Take 40 mg by mouth once daily.      famotidine (PEPCID) 40 MG tablet Take 40 mg by mouth 2 (two) times daily.      fluticasone propionate (FLONASE) 50 mcg/actuation nasal spray 1 spray by Each Nostril route.      gabapentin (NEURONTIN) 300 MG capsule Take 1 capsule (300 mg total) by mouth every evening. 30 capsule 11    hydrOXYzine pamoate (VISTARIL) 25 MG Cap TAKE 2 CAPSULES BY MOUTH 2 TIMES A DAY. 360 capsule 3    latanoprost 0.005 % ophthalmic solution Place 1 drop into both eyes nightly.      levocetirizine (XYZAL) 5 MG tablet Take 1 tablet by mouth every evening.      mirabegron (MYRBETRIQ) 50 mg Tb24 Take 50 mg by mouth.      ondansetron (ZOFRAN-ODT) 8 MG TbDL Take 8 mg by mouth every 8 (eight) hours as needed.      OPTICHAMBER ADULT MASK-LARGE Eli USE AS DIRECTED WITH SYMBICORT      pantoprazole (PROTONIX) 40 MG tablet Take 40 mg by mouth once daily.      SYMBICORT 160-4.5 mcg/actuation HFAA INHALE 2 PUFFS INTO THE LUNGS EVERY 12 HOURS. 6 g 3    venlafaxine (EFFEXOR-XR) 37.5 MG 24 hr capsule TAKE 1 CAPSULE (37.5 MG TOTAL) BY MOUTH ONCE DAILY. TO TAKE WITH 75MG ONCE DAILY 90 capsule 3    venlafaxine (EFFEXOR-XR) 75 MG 24 hr capsule TAKE 1 CAPSULE BY MOUTH EVERY DAY 90 capsule 3    [DISCONTINUED] lacosamide (VIMPAT) 150 mg Tab tablet Take 150 mg by mouth every 12 (twelve)  hours.      [DISCONTINUED] lisinopriL (PRINIVIL,ZESTRIL) 40 MG tablet Take 1 tablet (40 mg total) by mouth once daily. 90 tablet 3     No current facility-administered medications on file prior to visit.     Health Maintenance   Topic Date Due    Mammogram  10/21/2023    Shingles Vaccine (2 of 2) 12/01/2024 (Originally 6/15/2023)    Lipid Panel  12/15/2028    TETANUS VACCINE  09/09/2030    Colorectal Cancer Screening  06/10/2031    Hepatitis C Screening  Completed      Results for orders placed or performed in visit on 12/15/23   CBC/Diff Ambigious Default   Result Value Ref Range    WBC 5.7 3.4 - 10.8 x10E3/uL    RBC 4.85 3.77 - 5.28 x10E6/uL    Hemoglobin 14.1 11.1 - 15.9 g/dL    Hematocrit 44.6 34.0 - 46.6 %    MCV 92 79 - 97 fL    MCH 29.1 26.6 - 33.0 pg    MCHC 31.6 31.5 - 35.7 g/dL    RDW 13.1 11.7 - 15.4 %    Platelets 228 150 - 450 x10E3/uL    Neutrophils 71 Not Estab. %    Lymphs 16 Not Estab. %    Monocytes 9 Not Estab. %    Eos 4 Not Estab. %    Basos 0 Not Estab. %    Neutrophils (Absolute) 4.0 1.4 - 7.0 x10E3/uL    Lymphs (Absolute) 0.9 0.7 - 3.1 x10E3/uL    Monocytes(Absolute) 0.5 0.1 - 0.9 x10E3/uL    Eos (Absolute) 0.2 0.0 - 0.4 x10E3/uL    Baso (Absolute) 0.0 0.0 - 0.2 x10E3/uL    Immature Granulocytes 0 Not Estab. %   Comprehensive Metabolic Panel   Result Value Ref Range    Glucose 122 (H) 70 - 99 mg/dL    BUN 14 8 - 27 mg/dL    Creatinine 1.11 (H) 0.57 - 1.00 mg/dL    eGFR 56 (L) >59 mL/min/1.73    BUN/Creatinine Ratio 13 12 - 28    Sodium 140 134 - 144 mmol/L    Potassium 3.9 3.5 - 5.2 mmol/L    Chloride 106 96 - 106 mmol/L    CO2 18 (L) 20 - 29 mmol/L    Calcium 9.5 8.7 - 10.3 mg/dL    Protein, Total 7.4 6.0 - 8.5 g/dL    Albumin 4.1 3.9 - 4.9 g/dL    Globulin, Total 3.3 1.5 - 4.5 g/dL    Albumin/Globulin Ratio 1.2 1.2 - 2.2    Total Bilirubin 0.4 0.0 - 1.2 mg/dL    Alkaline Phosphatase 76 44 - 121 IU/L    AST 23 0 - 40 IU/L    ALT 26 0 - 32 IU/L   Urinalysis   Result Value Ref Range     Specific Gravity, UA 1.024 1.005 - 1.030    pH, UA 6.0 5.0 - 7.5    Color, UA Yellow Yellow    Clarity, UA Cloudy (A) Clear    Leukocytes, UA 1+ (A) Negative    Protein, UA Trace Negative/Trace    Glucose, UA Negative Negative    Ketones, UA Negative Negative    Occult Blood UA Negative Negative    Bilirubin, UA Negative Negative    Urobilinogen, UA 1.0 0.2 - 1.0 mg/dL    Nitrite, UA Negative Negative    Microscopic Examination See below:    Microscopic Examination   Result Value Ref Range    WBC, UA None seen 0 - 5 /hpf    RBC, UA None seen 0 - 2 /hpf    Epithelial Cells (non renal) >10 (A) 0 - 10 /hpf    Crystals Present (A) N/A    Crystal Type Calcium Oxalate N/A    Mucus, UA Present (A) Not Estab.    Bacteria, UA Few None seen/Few   Lipid Panel   Result Value Ref Range    Cholesterol 189 100 - 199 mg/dL    Triglycerides 90 0 - 149 mg/dL    HDL 50 >39 mg/dL    VLDL Cholesterol Kevin 16 5 - 40 mg/dL    LDL Calculated 123 (H) 0 - 99 mg/dL   Hemoglobin A1C   Result Value Ref Range    Hemoglobin A1c 6.2 (H) 4.8 - 5.6 %   TSH   Result Value Ref Range    TSH 3.480 0.450 - 4.500 uIU/mL   HIV 1/2 Ag/Ab (4th Gen)   Result Value Ref Range    HIV Screen 4th Generation wRfx Non Reactive Non Reactive          Assessment & Plan:     Active Problem List with Overview Notes    Diagnosis Date Noted    Chronic kidney disease, stage 3a 12/27/2023    Peripheral polyneuropathy 09/20/2023    Episodic confusion 02/07/2023    Decreased hearing of left ear 12/07/2022    Acute pain of both knees 12/07/2022    Advanced care planning/counseling discussion 11/02/2022    Seasonal allergies 11/02/2022    Postmenopausal 11/02/2022    Low back pain 09/21/2022    Arthritis 09/21/2022    Arthralgia of hip 09/21/2022    Moderate persistent asthma without complication 08/25/2022    Overactive bladder 07/05/2022    Family history of diabetes mellitus 07/05/2022    Breast cancer screening by mammogram 07/05/2022    Anxiety 07/05/2022    Medicare annual  "wellness visit, subsequent 07/05/2022    Depressive disorder 07/05/2022    Epilepsy 07/05/2022     Has had seizures since the age of 13. Initial seizure was in the setting of acute illness with hydronephrosis and continued to have seizures frequently. Used to stare and throw dolls at her sister and did not know they were seizures.  SHe used to have 30-40 seizures a month. Currently seizure frequency is about 1 every 6 months. She is on vimpat 200 BID, topamax 200 BID. She also takes clorazepate, clonazepam 1 BID. She has 2 types of seizures. First type is characterized by starting with "lord sola help me" and she makes sign of cross, then stares, hits her leg, falls to ground if standing lasting for 1-2 minutes. has postictal confusion for 5 minutes to 15 minutes. Has never had a generalized tonic clonic seizure witnessed. Second type is nocturnal. She has had a seizure in her sleep and woke up with tongue bitten. Overall she is very happy with her seizure frequency currently as it is much improved. She does complain of short-term memory problems, which she attributes to the medications.        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 Primary hypertension  Assessment & Plan:  Initial reading elevated. Repeat improved to near goal. Monitor bp closely. Continue on current prescription meds. Contact clinic for concerns. Patient is agreeable to plan and verbalized understanding    Orders:  -     Hypertension Digital Medicine (HDMP) Enrollment Order  -     Hypertension Digital Medicine (HDMP): Assign Onboarding Questionnaires  -     " lisinopriL (PRINIVIL,ZESTRIL) 40 MG tablet; Take 1 tablet (40 mg total) by mouth once daily.  Dispense: 90 tablet; Refill: 3    2. KHANH on CPAP  Assessment & Plan:  States does not have cpap. Needs.  Reports snoring and spouse has noted apnea.  Has been unable to get in touch with dr. Mortensen ([facility]) office to help facilitate getting machine so will place referral to sleep Mds with [facility]. Referral placed today. Contact clinic for concerns. Patient is agreeable to plan and verbalized understanding.         Orders:  -     Ambulatory referral/consult to Sleep Disorders; Future; Expected date: 01/03/2024    3. Class 3 severe obesity due to excess calories with serious comorbidity and body mass index (BMI) of 45.0 to 49.9 in adult  Assessment & Plan:  Encourage lifestyle change      4. Chronic kidney disease, stage 3a  Assessment & Plan:  Gfr 56 12/2023- improved from previous. Encourage fluids. monitor           Follow up for as scheduled for wellness 12/2024 or sooner prn.

## 2023-12-27 NOTE — ASSESSMENT & PLAN NOTE
States does not have cpap. Needs.  Reports snoring and spouse has noted apnea.  Has been unable to get in touch with dr. Mortensen ([facility]) office to help facilitate getting machine so will place referral to sleep Mds with [facility]. Referral placed today. Contact clinic for concerns. Patient is agreeable to plan and verbalized understanding.

## 2023-12-29 ENCOUNTER — TELEPHONE (OUTPATIENT)
Dept: FAMILY MEDICINE | Facility: CLINIC | Age: 63
End: 2023-12-29
Payer: MEDICARE

## 2023-12-29 DIAGNOSIS — G47.33 OSA ON CPAP: Primary | ICD-10-CM

## 2023-12-29 DIAGNOSIS — R56.9 SEIZURE: ICD-10-CM

## 2023-12-29 NOTE — TELEPHONE ENCOUNTER
I placed referral for brody eval at [facility] this week. Patient called stating changing neuro to [facility] and would like to for brody referral to go to [facility] as well. New referral placed.  I think I was able to cancel the old referral. If not, please disregard it. thanks      I have signed for the following orders AND/OR meds.  Please call the patient and ask the patient to schedule the testing AND/OR inform about any medications that were sent.      Orders Placed This Encounter   Procedures    Ambulatory referral/consult to Sleep Disorders     Standing Status:   Future     Standing Expiration Date:   1/29/2025     Referral Priority:   Routine     Referral Type:   Consultation     Requested Specialty:   Sleep Medicine     Number of Visits Requested:   1

## 2024-01-02 RX ORDER — TOPIRAMATE 100 MG/1
TABLET, FILM COATED ORAL
Qty: 180 TABLET | Refills: 1 | Status: SHIPPED | OUTPATIENT
Start: 2024-01-02

## 2024-01-12 ENCOUNTER — DOCUMENTATION ONLY (OUTPATIENT)
Dept: FAMILY MEDICINE | Facility: CLINIC | Age: 64
End: 2024-01-12
Payer: MEDICARE

## 2024-01-12 LAB
BCS RECOMMENDATION EXT: NORMAL
BMD RECOMMENDATION EXT: NORMAL

## 2024-01-16 ENCOUNTER — DOCUMENTATION ONLY (OUTPATIENT)
Dept: FAMILY MEDICINE | Facility: CLINIC | Age: 64
End: 2024-01-16
Payer: MEDICARE

## 2024-02-14 ENCOUNTER — TELEPHONE (OUTPATIENT)
Dept: NEUROLOGY | Facility: CLINIC | Age: 64
End: 2024-02-14
Payer: MEDICARE

## 2024-02-14 NOTE — TELEPHONE ENCOUNTER
Pls address anxiety issues, she has had side effects with higher doses of seizure medicines so I am trying not to increase her seizure medicines

## 2024-02-14 NOTE — TELEPHONE ENCOUNTER
Pt informed would be best to have PCP address current anxiety concerns, but relating to seizure activity Pt states last seizure was last night on 2/13/2024 denies missed medication lasted about 10 minutes denies seizures while sleep feels they have become more frequent with anxiety informed will update providers Pt did state she will contact PCP for anxiety trouble

## 2024-02-14 NOTE — TELEPHONE ENCOUNTER
Towards the end of January her anxiety has gotten worse, pt states her seizures are also starting to come more often than normal pt would like to know what can be done to help reduce her anxiety.    CB# [phone]

## 2024-02-15 ENCOUNTER — TELEPHONE (OUTPATIENT)
Dept: FAMILY MEDICINE | Facility: CLINIC | Age: 64
End: 2024-02-15
Payer: MEDICARE

## 2024-02-15 NOTE — TELEPHONE ENCOUNTER
Pt states she did contact her PCP regarding her anxiety has a follow up appointment on 2/21/2024 informed would like to hold off on increase of seizure medication due to past side effects to increased doses

## 2024-02-15 NOTE — TELEPHONE ENCOUNTER
----- Message from Chance Crane sent at 2/14/2024  3:53 PM CST -----  .Type:  Needs Medical Advice    Who Called: pt  Symptoms (please be specific): anxiety   How long has patient had these symptoms:    Pharmacy name and phone #:    Would the patient rather a call back or a response via [facility]? Call back  Best Call Back Number: [phone]  Additional Information: pt called requesting to speak with nurse

## 2024-02-15 NOTE — TELEPHONE ENCOUNTER
"Patient states she has been having a "nervous feeling" since January which has been triggering more frequent seizures. She stated she contacted Dr. Wheatley's office and they advised her to contact Dr. Leon to address. Patient notified of Dr. Leon being out on maternity leave and that she would be seeing either lashae or Dr. Crane. Patient verbalized understanding. Please reach out to patient to schedule an appt to discuss her concerns. Thank you   "

## 2024-02-19 ENCOUNTER — TELEPHONE (OUTPATIENT)
Dept: FAMILY MEDICINE | Facility: CLINIC | Age: 64
End: 2024-02-19
Payer: MEDICARE

## 2024-02-19 NOTE — TELEPHONE ENCOUNTER
Patient notified that the provider will review symptoms at upcoming visit and order labs if deemed necessary at that time. Patient verbalized understanding

## 2024-02-19 NOTE — TELEPHONE ENCOUNTER
----- Message from Marsha Moran sent at 2/19/2024  8:20 AM CST -----  Regarding: advice  Type:  Needs Medical Advice    Who Called: pt    Best Call Back Number: [phone]  Additional Information: stated that she would like to speak to mohit about upcoming appt. Stated that since she has been feel bad she wanted to know if blood work needs to be done because she can go and do them today. Please advise

## 2024-02-22 ENCOUNTER — HOSPITAL ENCOUNTER (OUTPATIENT)
Dept: RADIOLOGY | Facility: HOSPITAL | Age: 64
Discharge: HOME | End: 2024-02-22
Attending: FAMILY MEDICINE
Payer: MEDICARE

## 2024-02-22 ENCOUNTER — OFFICE VISIT (OUTPATIENT)
Dept: FAMILY MEDICINE | Facility: CLINIC | Age: 64
End: 2024-02-22
Payer: MEDICARE

## 2024-02-22 VITALS
TEMPERATURE: 99 F | WEIGHT: 272.63 LBS | DIASTOLIC BLOOD PRESSURE: 77 MMHG | HEIGHT: 65 IN | SYSTOLIC BLOOD PRESSURE: 144 MMHG | BODY MASS INDEX: 45.42 KG/M2 | OXYGEN SATURATION: 95 % | RESPIRATION RATE: 22 BRPM | HEART RATE: 94 BPM

## 2024-02-22 DIAGNOSIS — R06.02 SOB (SHORTNESS OF BREATH): ICD-10-CM

## 2024-02-22 DIAGNOSIS — J45.41 MODERATE PERSISTENT ASTHMA WITH EXACERBATION: Primary | ICD-10-CM

## 2024-02-22 DIAGNOSIS — J45.41 MODERATE PERSISTENT ASTHMA WITH EXACERBATION: ICD-10-CM

## 2024-02-22 DIAGNOSIS — R05.8 PRODUCTIVE COUGH: ICD-10-CM

## 2024-02-22 PROCEDURE — 94640 AIRWAY INHALATION TREATMENT: CPT | Mod: ,,, | Performed by: FAMILY MEDICINE

## 2024-02-22 PROCEDURE — 71046 X-RAY EXAM CHEST 2 VIEWS: CPT | Mod: TC

## 2024-02-22 PROCEDURE — 1160F RVW MEDS BY RX/DR IN RCRD: CPT | Mod: CPTII,,, | Performed by: FAMILY MEDICINE

## 2024-02-22 PROCEDURE — 3077F SYST BP >= 140 MM HG: CPT | Mod: CPTII,,, | Performed by: FAMILY MEDICINE

## 2024-02-22 PROCEDURE — 3008F BODY MASS INDEX DOCD: CPT | Mod: CPTII,,, | Performed by: FAMILY MEDICINE

## 2024-02-22 PROCEDURE — 96372 THER/PROPH/DIAG INJ SC/IM: CPT | Mod: 59,,, | Performed by: FAMILY MEDICINE

## 2024-02-22 PROCEDURE — 99213 OFFICE O/P EST LOW 20 MIN: CPT | Mod: 25,,, | Performed by: FAMILY MEDICINE

## 2024-02-22 PROCEDURE — 3078F DIAST BP <80 MM HG: CPT | Mod: CPTII,,, | Performed by: FAMILY MEDICINE

## 2024-02-22 PROCEDURE — 4010F ACE/ARB THERAPY RXD/TAKEN: CPT | Mod: CPTII,,, | Performed by: FAMILY MEDICINE

## 2024-02-22 PROCEDURE — 1159F MED LIST DOCD IN RCRD: CPT | Mod: CPTII,,, | Performed by: FAMILY MEDICINE

## 2024-02-22 RX ORDER — DEXAMETHASONE SODIUM PHOSPHATE 4 MG/ML
6 INJECTION, SOLUTION INTRA-ARTICULAR; INTRALESIONAL; INTRAMUSCULAR; INTRAVENOUS; SOFT TISSUE
Status: COMPLETED | OUTPATIENT
Start: 2024-02-22 | End: 2024-02-22

## 2024-02-22 RX ORDER — ALBUTEROL SULFATE 0.83 MG/ML
2.5 SOLUTION RESPIRATORY (INHALATION)
Status: DISCONTINUED | OUTPATIENT
Start: 2024-02-22 | End: 2024-02-22

## 2024-02-22 RX ORDER — BETAMETHASONE SODIUM PHOSPHATE AND BETAMETHASONE ACETATE 3; 3 MG/ML; MG/ML
6 INJECTION, SUSPENSION INTRA-ARTICULAR; INTRALESIONAL; INTRAMUSCULAR; SOFT TISSUE
Status: DISCONTINUED | OUTPATIENT
Start: 2024-02-22 | End: 2024-02-22

## 2024-02-22 RX ORDER — ALBUTEROL SULFATE 1.25 MG/3ML
1.25 SOLUTION RESPIRATORY (INHALATION)
Status: COMPLETED | OUTPATIENT
Start: 2024-02-22 | End: 2024-02-22

## 2024-02-22 RX ORDER — BENZONATATE 200 MG/1
200 CAPSULE ORAL 3 TIMES DAILY PRN
Qty: 21 CAPSULE | Refills: 0 | Status: SHIPPED | OUTPATIENT
Start: 2024-02-22 | End: 2024-02-29

## 2024-02-22 RX ORDER — PREDNISONE 20 MG/1
20 TABLET ORAL 2 TIMES DAILY
Qty: 10 TABLET | Refills: 0 | Status: SHIPPED | OUTPATIENT
Start: 2024-02-22 | End: 2024-02-27

## 2024-02-22 RX ORDER — AZITHROMYCIN 250 MG/1
TABLET, FILM COATED ORAL
Qty: 6 TABLET | Refills: 0 | Status: SHIPPED | OUTPATIENT
Start: 2024-02-22 | End: 2024-02-27

## 2024-02-22 RX ADMIN — DEXAMETHASONE SODIUM PHOSPHATE 6 MG: 4 INJECTION, SOLUTION INTRA-ARTICULAR; INTRALESIONAL; INTRAMUSCULAR; INTRAVENOUS; SOFT TISSUE at 05:02

## 2024-02-22 RX ADMIN — ALBUTEROL SULFATE 1.25 MG: 1.25 SOLUTION RESPIRATORY (INHALATION) at 02:02

## 2024-03-04 PROBLEM — Z00.00 MEDICARE ANNUAL WELLNESS VISIT, SUBSEQUENT: Status: RESOLVED | Noted: 2022-07-05 | Resolved: 2024-03-04

## 2024-03-11 ENCOUNTER — TELEPHONE (OUTPATIENT)
Dept: FAMILY MEDICINE | Facility: CLINIC | Age: 64
End: 2024-03-11
Payer: MEDICARE

## 2024-03-11 NOTE — TELEPHONE ENCOUNTER
----- Message from Yazmin Quintana sent at 3/11/2024  9:47 AM CDT -----  Regarding: return call  Type:  Patient Returning Call    Who Called: pt      Does the patient know what this is regarding?:  questions  of concerns of health    Would the patient rather a call back or a response via [facility]?  Call back    Best Call Back Number: [phone]    Additional Information:  please advise, pt called for return call to discuss health, thanks

## 2024-03-11 NOTE — TELEPHONE ENCOUNTER
Patient reports symptoms of a cough and wanted med called in. I scheduled patient an apt with Dr. Crane tomorrow for eval

## 2024-03-12 ENCOUNTER — OFFICE VISIT (OUTPATIENT)
Dept: FAMILY MEDICINE | Facility: CLINIC | Age: 64
End: 2024-03-12
Payer: MEDICARE

## 2024-03-12 ENCOUNTER — LAB VISIT (OUTPATIENT)
Dept: LAB | Facility: HOSPITAL | Age: 64
End: 2024-03-12
Attending: FAMILY MEDICINE
Payer: MEDICARE

## 2024-03-12 VITALS
RESPIRATION RATE: 18 BRPM | HEIGHT: 65 IN | TEMPERATURE: 98 F | HEART RATE: 99 BPM | OXYGEN SATURATION: 95 % | SYSTOLIC BLOOD PRESSURE: 139 MMHG | BODY MASS INDEX: 45.43 KG/M2 | DIASTOLIC BLOOD PRESSURE: 82 MMHG | WEIGHT: 272.69 LBS

## 2024-03-12 DIAGNOSIS — J45.40 MODERATE PERSISTENT ASTHMA WITHOUT COMPLICATION: Primary | ICD-10-CM

## 2024-03-12 DIAGNOSIS — G40.909 NONINTRACTABLE EPILEPSY WITHOUT STATUS EPILEPTICUS, UNSPECIFIED EPILEPSY TYPE: ICD-10-CM

## 2024-03-12 DIAGNOSIS — J30.2 SEASONAL ALLERGIES: ICD-10-CM

## 2024-03-12 DIAGNOSIS — Z12.31 BREAST CANCER SCREENING BY MAMMOGRAM: ICD-10-CM

## 2024-03-12 DIAGNOSIS — Z00.00 MEDICARE ANNUAL WELLNESS VISIT, SUBSEQUENT: ICD-10-CM

## 2024-03-12 DIAGNOSIS — K21.9 GASTROESOPHAGEAL REFLUX DISEASE, UNSPECIFIED WHETHER ESOPHAGITIS PRESENT: ICD-10-CM

## 2024-03-12 DIAGNOSIS — N32.81 OVERACTIVE BLADDER: ICD-10-CM

## 2024-03-12 DIAGNOSIS — Z78.0 POSTMENOPAUSAL: ICD-10-CM

## 2024-03-12 DIAGNOSIS — J44.9 CHRONIC OBSTRUCTIVE PULMONARY DISEASE, UNSPECIFIED COPD TYPE: ICD-10-CM

## 2024-03-12 DIAGNOSIS — C18.5 CANCER OF SPLENIC FLEXURE: ICD-10-CM

## 2024-03-12 DIAGNOSIS — E66.01 CLASS 3 SEVERE OBESITY DUE TO EXCESS CALORIES WITH SERIOUS COMORBIDITY AND BODY MASS INDEX (BMI) OF 45.0 TO 49.9 IN ADULT: ICD-10-CM

## 2024-03-12 DIAGNOSIS — I10 PRIMARY HYPERTENSION: ICD-10-CM

## 2024-03-12 DIAGNOSIS — Z71.89 ADVANCED CARE PLANNING/COUNSELING DISCUSSION: ICD-10-CM

## 2024-03-12 LAB
ALBUMIN SERPL-MCNC: 3.7 G/DL (ref 3.4–4.8)
ALBUMIN/GLOB SERPL: 0.8 RATIO (ref 1.1–2)
ALP SERPL-CCNC: 73 UNIT/L (ref 40–150)
ALT SERPL-CCNC: 37 UNIT/L (ref 0–55)
AST SERPL-CCNC: 32 UNIT/L (ref 5–34)
BASOPHILS # BLD AUTO: 0.04 X10(3)/MCL
BASOPHILS NFR BLD AUTO: 0.4 %
BILIRUB SERPL-MCNC: 0.3 MG/DL
BUN SERPL-MCNC: 11.7 MG/DL (ref 9.8–20.1)
CALCIUM SERPL-MCNC: 9.2 MG/DL (ref 8.4–10.2)
CHLORIDE SERPL-SCNC: 113 MMOL/L (ref 98–107)
CO2 SERPL-SCNC: 24 MMOL/L (ref 23–31)
CREAT SERPL-MCNC: 1.33 MG/DL (ref 0.55–1.02)
EOSINOPHIL # BLD AUTO: 0.36 X10(3)/MCL (ref 0–0.9)
EOSINOPHIL NFR BLD AUTO: 3.6 %
ERYTHROCYTE [DISTWIDTH] IN BLOOD BY AUTOMATED COUNT: 13 % (ref 11.5–17)
GFR SERPLBLD CREATININE-BSD FMLA CKD-EPI: 45 MLS/MIN/1.73/M2
GLOBULIN SER-MCNC: 4.8 GM/DL (ref 2.4–3.5)
GLUCOSE SERPL-MCNC: 117 MG/DL (ref 82–115)
HCT VFR BLD AUTO: 47.8 % (ref 37–47)
HGB BLD-MCNC: 14.7 G/DL (ref 12–16)
HIV 1+2 AB+HIV1 P24 AG SERPL QL IA: NONREACTIVE
IMM GRANULOCYTES # BLD AUTO: 0.02 X10(3)/MCL (ref 0–0.04)
IMM GRANULOCYTES NFR BLD AUTO: 0.2 %
LYMPHOCYTES # BLD AUTO: 2.21 X10(3)/MCL (ref 0.6–4.6)
LYMPHOCYTES NFR BLD AUTO: 22.1 %
MAGNESIUM SERPL-MCNC: 2.2 MG/DL (ref 1.6–2.6)
MCH RBC QN AUTO: 28.4 PG (ref 27–31)
MCHC RBC AUTO-ENTMCNC: 30.8 G/DL (ref 33–36)
MCV RBC AUTO: 92.3 FL (ref 80–94)
MONOCYTES # BLD AUTO: 0.91 X10(3)/MCL (ref 0.1–1.3)
MONOCYTES NFR BLD AUTO: 9.1 %
NEUTROPHILS # BLD AUTO: 6.45 X10(3)/MCL (ref 2.1–9.2)
NEUTROPHILS NFR BLD AUTO: 64.6 %
NRBC BLD AUTO-RTO: 0 %
PHOSPHATE SERPL-MCNC: 2.5 MG/DL (ref 2.3–4.7)
PLATELET # BLD AUTO: 280 X10(3)/MCL (ref 130–400)
PMV BLD AUTO: 9.3 FL (ref 7.4–10.4)
POTASSIUM SERPL-SCNC: 4 MMOL/L (ref 3.5–5.1)
PROT SERPL-MCNC: 8.5 GM/DL (ref 5.8–7.6)
RBC # BLD AUTO: 5.18 X10(6)/MCL (ref 4.2–5.4)
SODIUM SERPL-SCNC: 146 MMOL/L (ref 136–145)
WBC # SPEC AUTO: 9.99 X10(3)/MCL (ref 4.5–11.5)

## 2024-03-12 PROCEDURE — 3008F BODY MASS INDEX DOCD: CPT | Mod: CPTII,,, | Performed by: FAMILY MEDICINE

## 2024-03-12 PROCEDURE — 80053 COMPREHEN METABOLIC PANEL: CPT

## 2024-03-12 PROCEDURE — 3079F DIAST BP 80-89 MM HG: CPT | Mod: CPTII,,, | Performed by: FAMILY MEDICINE

## 2024-03-12 PROCEDURE — 3075F SYST BP GE 130 - 139MM HG: CPT | Mod: CPTII,,, | Performed by: FAMILY MEDICINE

## 2024-03-12 PROCEDURE — 84100 ASSAY OF PHOSPHORUS: CPT

## 2024-03-12 PROCEDURE — 36415 COLL VENOUS BLD VENIPUNCTURE: CPT

## 2024-03-12 PROCEDURE — 1160F RVW MEDS BY RX/DR IN RCRD: CPT | Mod: CPTII,,, | Performed by: FAMILY MEDICINE

## 2024-03-12 PROCEDURE — 99214 OFFICE O/P EST MOD 30 MIN: CPT | Mod: ,,, | Performed by: FAMILY MEDICINE

## 2024-03-12 PROCEDURE — 83735 ASSAY OF MAGNESIUM: CPT

## 2024-03-12 PROCEDURE — 1159F MED LIST DOCD IN RCRD: CPT | Mod: CPTII,,, | Performed by: FAMILY MEDICINE

## 2024-03-12 PROCEDURE — 4010F ACE/ARB THERAPY RXD/TAKEN: CPT | Mod: CPTII,,, | Performed by: FAMILY MEDICINE

## 2024-03-12 PROCEDURE — 85025 COMPLETE CBC W/AUTO DIFF WBC: CPT

## 2024-03-12 PROCEDURE — 87389 HIV-1 AG W/HIV-1&-2 AB AG IA: CPT

## 2024-03-12 RX ORDER — NEBULIZER AND COMPRESSOR
1 EACH MISCELLANEOUS EVERY 6 HOURS PRN
Qty: 1 EACH | Refills: 0 | Status: SHIPPED | OUTPATIENT
Start: 2024-03-12

## 2024-03-12 RX ORDER — IPRATROPIUM BROMIDE AND ALBUTEROL SULFATE 2.5; .5 MG/3ML; MG/3ML
3 SOLUTION RESPIRATORY (INHALATION) EVERY 6 HOURS PRN
Qty: 75 ML | Refills: 0 | Status: SHIPPED | OUTPATIENT
Start: 2024-03-12 | End: 2024-05-28 | Stop reason: ALTCHOICE

## 2024-03-12 NOTE — PROGRESS NOTES
Subjective:      Patient ID: Christie Marcum is a 63 y.o. female.    Chief Complaint: Cough (Cough for 1 month)    Disclaimer:  This note is prepared using voice recognition software and as such is likely to have errors despite attempts at proofreading. Please contact me for questions.     63-year-old female who presents with her  for follow-up of cough that has been present for approximately 1 month.  The patient was evaluated on 2/22/2024 for similar symptoms.  She was prescribed benzonatate, azithromycin, and prednisone.  Chest x-ray also revealed increased interstitial markings.  The patient states that she is still having occasional wheezing, cough is now nonproductive and she is taking OTC children's Robitussin that has not resolved symptoms.  The patient still does not have a nebulizer.  She denies fever, nausea or vomiting.  Epilepsy:  The patient states that over the last 2 months she has had increase in seizure activity.  She admits to approximately 5-6 seizures over the last 1 month.  She is followed by Dr. Worley.  The patient states that prior to onset of increased seizure activity medication dosages had been changed.  Her last seizure was approximately 2 nights ago.  The patient's  states that she has a short postictal phase.      Past Medical History:   Diagnosis Date    Anxiety     Arthritis     Asthma     Cancer of splenic flexure     Decreased hearing of left ear 12/07/2022    Depressive disorder 07/05/2022    Epilepsy 07/05/2022    Has had seizures since the age of 13. Initial seizure was in the setting of acute illness with hydronephrosis and continued to have seizures frequently. Used to stare and throw dolls at her sister and did not know they were seizures.  SHe used to have 30-40 seizures a month. Currently seizure frequency is about 1 every 6 months. She is on vimpat 200 BID, topamax 200 BID. She also takes clorazepate    Gastroesophageal reflux disease 07/05/2022     Hypertension     IGT (impaired glucose tolerance)     Low back pain 09/21/2022    Moderate persistent asthma without complication 08/25/2022    Neuropathy 07/05/2022    On home oxygen therapy 09/21/2022    KHANH on CPAP     Overactive bladder 07/05/2022        Current Outpatient Medications on File Prior to Visit   Medication Sig Dispense Refill    albuterol (PROVENTIL/VENTOLIN HFA) 90 mcg/actuation inhaler INHALE 2 PUFFS INTO THE LUNGS EVERY 6 HOURS AS NEEDED FOR WHEEZE 18 g 11    cholestyramine (QUESTRAN) 4 gram packet Take by mouth.      clonazePAM (KLONOPIN) 1 MG tablet Take 0.5 tablets (0.5 mg total) by mouth every evening. 15 tablet 5    esomeprazole (NEXIUM) 40 MG capsule Take 40 mg by mouth once daily.      famotidine (PEPCID) 40 MG tablet Take 40 mg by mouth 2 (two) times daily.      fluticasone propionate (FLONASE) 50 mcg/actuation nasal spray 1 spray by Each Nostril route.      gabapentin (NEURONTIN) 300 MG capsule Take 1 capsule (300 mg total) by mouth every evening. 30 capsule 11    hydrOXYzine pamoate (VISTARIL) 25 MG Cap TAKE 2 CAPSULES BY MOUTH 2 TIMES A DAY. 360 capsule 3    lacosamide (VIMPAT) 200 mg Tab tablet Take 200 mg by mouth every 12 (twelve) hours.      latanoprost 0.005 % ophthalmic solution Place 1 drop into both eyes nightly.      levocetirizine (XYZAL) 5 MG tablet Take 1 tablet by mouth every evening.      lisinopriL (PRINIVIL,ZESTRIL) 40 MG tablet Take 1 tablet (40 mg total) by mouth once daily. 90 tablet 3    mirabegron (MYRBETRIQ) 50 mg Tb24 Take 50 mg by mouth.      ondansetron (ZOFRAN-ODT) 8 MG TbDL Take 8 mg by mouth every 8 (eight) hours as needed.      OPTICHAMBER ADULT MASK-LARGE Lei USE AS DIRECTED WITH SYMBICORT      pantoprazole (PROTONIX) 40 MG tablet Take 40 mg by mouth once daily.      SYMBICORT 160-4.5 mcg/actuation HFAA INHALE 2 PUFFS INTO THE LUNGS EVERY 12 HOURS. 6 g 3    topiramate (TOPAMAX) 100 MG tablet TAKE 1 TABLET BY MOUTH TWICE A  tablet 1    venlafaxine  "(EFFEXOR-XR) 37.5 MG 24 hr capsule TAKE 1 CAPSULE (37.5 MG TOTAL) BY MOUTH ONCE DAILY. TO TAKE WITH 75MG ONCE DAILY 90 capsule 3    venlafaxine (EFFEXOR-XR) 75 MG 24 hr capsule TAKE 1 CAPSULE BY MOUTH EVERY DAY 90 capsule 3     No current facility-administered medications on file prior to visit.        Review of patient's allergies indicates:   Allergen Reactions    Cephalexin      Other reaction(s): Seizure  hallucinations    Other reaction(s): Unknown    Ciprofloxacin      Other reaction(s): Seiurus  Other reaction(s): Unknown    Codeine      Other reaction(s): Seizure  seizure      Metronidazole      Other reaction(s): Seizure    Metronidazole hcl      Other reaction(s): Unknown    Naproxen      Other reaction(s): Seizure  Nausea/seizure      Pantoprazole sodium      Other reaction(s): Unknown    Tetracycline      Other reaction(s): Seizure  seizure      Tramadol Other (See Comments)     Other reaction(s): Seizure  SEIZURES HALLUCINATION            Review of Systems   Constitutional:  Negative for chills, diaphoresis and fever.   Eyes:  Negative for blurred vision and double vision.   Respiratory:  Positive for cough and wheezing. Negative for shortness of breath.    Cardiovascular:  Negative for chest pain and leg swelling.   Gastrointestinal:  Negative for abdominal pain, diarrhea, nausea and vomiting.   Skin:  Negative for rash.   Neurological:  Positive for seizures. Negative for dizziness, tremors and headaches.       Objective:     Vitals:    03/12/24 0744   BP: 139/82   BP Location: Right arm   Patient Position: Sitting   Pulse: 99   Resp: 18   Temp: 97.8 °F (36.6 °C)   TempSrc: Temporal   SpO2: 95%   Weight: 123.7 kg (272 lb 11.2 oz)   Height: 5' 5" (1.651 m)     Physical Exam  Constitutional:       General: She is not in acute distress.     Appearance: She is not toxic-appearing or diaphoretic.   HENT:      Head: Normocephalic and atraumatic.      Right Ear: Tympanic membrane, ear canal and external ear " normal. There is no impacted cerumen.      Left Ear: Tympanic membrane, ear canal and external ear normal. There is no impacted cerumen.      Nose: Nose normal.      Mouth/Throat:      Mouth: Mucous membranes are moist.      Pharynx: Oropharynx is clear. No oropharyngeal exudate or posterior oropharyngeal erythema.   Eyes:      General: No scleral icterus.     Extraocular Movements: Extraocular movements intact.      Right eye: No nystagmus.      Left eye: No nystagmus.      Conjunctiva/sclera: Conjunctivae normal.   Cardiovascular:      Rate and Rhythm: Normal rate and regular rhythm.      Heart sounds: Normal heart sounds. No murmur heard.     No friction rub. No gallop.   Pulmonary:      Effort: Pulmonary effort is normal. No respiratory distress.      Breath sounds: Normal breath sounds. No stridor. No wheezing, rhonchi or rales.   Musculoskeletal:         General: Normal range of motion.      Cervical back: Normal range of motion and neck supple. No rigidity.   Lymphadenopathy:      Cervical: No cervical adenopathy.   Skin:     General: Skin is warm and dry.      Coloration: Skin is not pale.   Neurological:      General: No focal deficit present.      Mental Status: She is alert and oriented to person, place, and time. Mental status is at baseline.      GCS: GCS eye subscore is 4. GCS verbal subscore is 5. GCS motor subscore is 6.      Cranial Nerves: Cranial nerves 2-12 are intact. No cranial nerve deficit, dysarthria or facial asymmetry.      Motor: Motor function is intact.      Gait: Gait is intact.   Psychiatric:         Mood and Affect: Mood normal.         Behavior: Behavior normal.         Thought Content: Thought content normal.         Judgment: Judgment normal.         Assessment:     1. Moderate persistent asthma without complication    2. Chronic obstructive pulmonary disease, unspecified COPD type    3. Nonintractable epilepsy without status epilepticus, unspecified epilepsy type      Plan:      1. Moderate persistent asthma without complication  - nebulizer and compressor Eli; 1 each by Misc.(Non-Drug; Combo Route) route every 6 (six) hours as needed (wheezing).  Dispense: 1 each; Refill: 0  - NEBULIZER KIT (SUPPLIES) FOR HOME USE  - albuterol-ipratropium (DUO-NEB) 2.5 mg-0.5 mg/3 mL nebulizer solution; Take 3 mLs by nebulization every 6 (six) hours as needed for Wheezing. Rescue  Dispense: 75 mL; Refill: 0  Exam significantly improved compared to 02/22/2024.    Lungs clear to auscultation bilaterally.  Prescription for new nebulizer with supplies as well as DuoNeb solution sent to pharmacy.    Continue current medications.      2. Chronic obstructive pulmonary disease, unspecified COPD type  - nebulizer and compressor Eli; 1 each by Misc.(Non-Drug; Combo Route) route every 6 (six) hours as needed (wheezing).  Dispense: 1 each; Refill: 0  - NEBULIZER KIT (SUPPLIES) FOR HOME USE  - albuterol-ipratropium (DUO-NEB) 2.5 mg-0.5 mg/3 mL nebulizer solution; Take 3 mLs by nebulization every 6 (six) hours as needed for Wheezing. Rescue  Dispense: 75 mL; Refill: 0    3. Nonintractable epilepsy without status epilepticus, unspecified epilepsy type  - EEG,w/awake & asleep record; Future  - Comprehensive Metabolic Panel; Future  - CBC Auto Differential; Future  - Magnesium; Future  - Phosphorus; Future  - BATH/SHOWER CHAIR FOR HOME USE  - Ambulatory referral/consult to Neurology; Future  Labs and EEG ordered above.    Strict ED precautions discussed with patient and  who verbalized understanding and agreement.      Follow up in about 2 months (around 5/12/2024) for Anxiety, Seizures, COPD.  Christie Marcum was given education on their disease process and medications.

## 2024-03-12 NOTE — PROGRESS NOTES
Subjective:      Patient ID: Christie Marcum is a 63 y.o. female.    Chief Complaint: Cough (Cough congestion green color phlegm)    Disclaimer:  This note is prepared using voice recognition software and as such is likely to have errors despite attempts at proofreading. Please contact me for questions.     63-year-old female who presents for evaluation of productive cough, congestion and wheezing.  The patient states that symptoms have been present for multiple days.  She has a history of moderate persistent asthma and COPD.  She is prescribed Symbicort and an albuterol inhaler.  No recent chest pain, fever, nausea or vomiting.  The patient admits to mild shortness of breath during coughing spells.    Past Medical History:   Diagnosis Date    Anxiety     Arthritis     Asthma     Cancer of splenic flexure     Decreased hearing of left ear 12/07/2022    Depressive disorder 07/05/2022    Epilepsy 07/05/2022    Has had seizures since the age of 13. Initial seizure was in the setting of acute illness with hydronephrosis and continued to have seizures frequently. Used to stare and throw dolls at her sister and did not know they were seizures.  SHe used to have 30-40 seizures a month. Currently seizure frequency is about 1 every 6 months. She is on vimpat 200 BID, topamax 200 BID. She also takes clorazepate    Gastroesophageal reflux disease 07/05/2022    Hypertension     IGT (impaired glucose tolerance)     Low back pain 09/21/2022    Moderate persistent asthma without complication 08/25/2022    Neuropathy 07/05/2022    On home oxygen therapy 09/21/2022    KHANH on CPAP     Overactive bladder 07/05/2022        Current Outpatient Medications on File Prior to Visit   Medication Sig Dispense Refill    albuterol (PROVENTIL/VENTOLIN HFA) 90 mcg/actuation inhaler INHALE 2 PUFFS INTO THE LUNGS EVERY 6 HOURS AS NEEDED FOR WHEEZE 18 g 11    cholestyramine (QUESTRAN) 4 gram packet Take by mouth.      clonazePAM (KLONOPIN) 1 MG  tablet Take 0.5 tablets (0.5 mg total) by mouth every evening. 15 tablet 5    esomeprazole (NEXIUM) 40 MG capsule Take 40 mg by mouth once daily.      famotidine (PEPCID) 40 MG tablet Take 40 mg by mouth 2 (two) times daily.      fluticasone propionate (FLONASE) 50 mcg/actuation nasal spray 1 spray by Each Nostril route.      gabapentin (NEURONTIN) 300 MG capsule Take 1 capsule (300 mg total) by mouth every evening. 30 capsule 11    hydrOXYzine pamoate (VISTARIL) 25 MG Cap TAKE 2 CAPSULES BY MOUTH 2 TIMES A DAY. 360 capsule 3    lacosamide (VIMPAT) 200 mg Tab tablet Take 200 mg by mouth every 12 (twelve) hours.      latanoprost 0.005 % ophthalmic solution Place 1 drop into both eyes nightly.      levocetirizine (XYZAL) 5 MG tablet Take 1 tablet by mouth every evening.      lisinopriL (PRINIVIL,ZESTRIL) 40 MG tablet Take 1 tablet (40 mg total) by mouth once daily. 90 tablet 3    mirabegron (MYRBETRIQ) 50 mg Tb24 Take 50 mg by mouth.      ondansetron (ZOFRAN-ODT) 8 MG TbDL Take 8 mg by mouth every 8 (eight) hours as needed.      OPTICHAMBER ADULT MASK-LARGE Eli USE AS DIRECTED WITH SYMBICORT      pantoprazole (PROTONIX) 40 MG tablet Take 40 mg by mouth once daily.      SYMBICORT 160-4.5 mcg/actuation HFAA INHALE 2 PUFFS INTO THE LUNGS EVERY 12 HOURS. 6 g 3    topiramate (TOPAMAX) 100 MG tablet TAKE 1 TABLET BY MOUTH TWICE A  tablet 1    venlafaxine (EFFEXOR-XR) 37.5 MG 24 hr capsule TAKE 1 CAPSULE (37.5 MG TOTAL) BY MOUTH ONCE DAILY. TO TAKE WITH 75MG ONCE DAILY 90 capsule 3    venlafaxine (EFFEXOR-XR) 75 MG 24 hr capsule TAKE 1 CAPSULE BY MOUTH EVERY DAY 90 capsule 3     No current facility-administered medications on file prior to visit.        Review of patient's allergies indicates:   Allergen Reactions    Cephalexin      Other reaction(s): Seizure  hallucinations    Other reaction(s): Unknown    Ciprofloxacin      Other reaction(s): Seiurus  Other reaction(s): Unknown    Codeine      Other reaction(s):  "Seizure  seizure      Metronidazole      Other reaction(s): Seizure    Metronidazole hcl      Other reaction(s): Unknown    Naproxen      Other reaction(s): Seizure  Nausea/seizure      Pantoprazole sodium      Other reaction(s): Unknown    Tetracycline      Other reaction(s): Seizure  seizure      Tramadol Other (See Comments)     Other reaction(s): Seizure  SEIZURES HALLUCINATION        Review of Systems   Constitutional:  Negative for chills, diaphoresis and fever.   HENT:  Positive for congestion.    Respiratory:  Positive for cough, sputum production, shortness of breath and wheezing.    Cardiovascular:  Negative for chest pain.   Gastrointestinal:  Negative for nausea and vomiting.   Genitourinary:  Negative for flank pain.   Neurological:  Negative for dizziness.       Objective:     Vitals:    02/22/24 1315 02/22/24 1409   BP: (!) 158/89 (!) 144/77   BP Location: Right arm Left arm   Patient Position: Sitting    Pulse: 94    Resp: (!) 22    Temp: 98.9 °F (37.2 °C)    TempSrc: Temporal    SpO2: 96% 95%   Weight: 123.7 kg (272 lb 9.6 oz)    Height: 5' 5" (1.651 m)      Physical Exam  Constitutional:       General: She is not in acute distress.     Appearance: She is not toxic-appearing or diaphoretic.   HENT:      Head: Normocephalic and atraumatic.      Right Ear: Tympanic membrane, ear canal and external ear normal. There is no impacted cerumen.      Left Ear: Tympanic membrane, ear canal and external ear normal. There is no impacted cerumen.      Nose: Nose normal.      Mouth/Throat:      Mouth: Mucous membranes are moist.      Pharynx: Oropharynx is clear. No oropharyngeal exudate or posterior oropharyngeal erythema.   Eyes:      General: No scleral icterus.     Extraocular Movements: Extraocular movements intact.      Conjunctiva/sclera: Conjunctivae normal.   Cardiovascular:      Rate and Rhythm: Normal rate and regular rhythm.      Heart sounds: Normal heart sounds. No murmur heard.     No friction rub. " No gallop.   Pulmonary:      Effort: Pulmonary effort is normal. Prolonged expiration present. No tachypnea, bradypnea or respiratory distress.      Breath sounds: No stridor. Examination of the right-lower field reveals wheezing. Examination of the left-lower field reveals wheezing. Wheezing present. No rhonchi or rales.   Musculoskeletal:         General: Normal range of motion.      Cervical back: Normal range of motion and neck supple. No rigidity.   Lymphadenopathy:      Cervical: No cervical adenopathy.   Skin:     General: Skin is warm and dry.      Coloration: Skin is not pale.   Neurological:      General: No focal deficit present.      Mental Status: She is alert and oriented to person, place, and time. Mental status is at baseline.   Psychiatric:         Mood and Affect: Mood normal.         Behavior: Behavior normal.         Thought Content: Thought content normal.         Judgment: Judgment normal.         Assessment:     1. Moderate persistent asthma with exacerbation    2. SOB (shortness of breath)    3. Productive cough      Plan:     1. Moderate persistent asthma with exacerbation  - azithromycin (Z-FADY) 250 MG tablet; Take 2 tablets by mouth on day 1; Take 1 tablet by mouth on days 2-5  Dispense: 6 tablet; Refill: 0  - predniSONE (DELTASONE) 20 MG tablet; Take 1 tablet (20 mg total) by mouth 2 (two) times daily. for 5 days  Dispense: 10 tablet; Refill: 0  - benzonatate (TESSALON) 200 MG capsule; Take 1 capsule (200 mg total) by mouth 3 (three) times daily as needed for Cough.  Dispense: 21 capsule; Refill: 0  - X-Ray Chest PA And Lateral; Future  - albuterol nebulizer solution 1.25 mg    2. SOB (shortness of breath)  - COVID/RSV/FLU A&B PCR; Future    3. Productive cough  - COVID/RSV/FLU A&B PCR; Future       Follow up today (on 2/22/2024), or if symptoms worsen or fail to improve.  Christie Marcum was given education on their disease process and medications.

## 2024-03-18 ENCOUNTER — TELEPHONE (OUTPATIENT)
Dept: FAMILY MEDICINE | Facility: CLINIC | Age: 64
End: 2024-03-18
Payer: MEDICARE

## 2024-03-18 NOTE — TELEPHONE ENCOUNTER
----- Message from Marshaeneida Moran sent at 3/18/2024 10:50 AM CDT -----  Regarding: advice  Type:  Needs Medical Advice    Who Called: pt    Best Call Back Number: [phone]  Additional Information: stated that she was told if she didn't get a call from [facility] for her nebulizer. Stated that she started wheezing last night. please advise

## 2024-03-18 NOTE — TELEPHONE ENCOUNTER
----- Message from Angela Childs sent at 3/18/2024 11:39 AM CDT -----  Regarding: pharmacy call  .Type:  Pharmacy Calling to Clarify an RX    Name of Caller:Hyacinth    Pharmacy Name:[facility]    Prescription Name:Nebulizer    What do they need to clarify?:Pharmacy is stating that they didn't receive office notes; please fax over.  Best Call Back Number:[phone]    Additional Information: please fax over last office visit notes to [facility]; fax number is listed below.    Fax#: [phone]

## 2024-03-21 ENCOUNTER — TELEPHONE (OUTPATIENT)
Dept: FAMILY MEDICINE | Facility: CLINIC | Age: 64
End: 2024-03-21
Payer: MEDICARE

## 2024-03-21 NOTE — TELEPHONE ENCOUNTER
----- Message from Grazyna Chung sent at 3/20/2024 10:03 AM CDT -----  Regarding: med advice  .Type:  Needs Medical Advice    Who Called: Pt  Symptoms (please be specific):    How long has patient had these symptoms:    Pharmacy name and phone #:    Would the patient rather a call back or a response via [facility]? Call back  Best Call Back Number: [phone]  Additional Information: Needs to speak with nurse in regards to her neb treatments.

## 2024-04-05 RX ORDER — LACOSAMIDE 200 MG/1
200 TABLET ORAL EVERY 12 HOURS
Qty: 60 TABLET | Refills: 5 | Status: SHIPPED | OUTPATIENT
Start: 2024-04-05

## 2024-05-02 ENCOUNTER — OFFICE VISIT (OUTPATIENT)
Dept: NEUROLOGY | Facility: CLINIC | Age: 64
End: 2024-05-02
Payer: MEDICARE

## 2024-05-02 VITALS
SYSTOLIC BLOOD PRESSURE: 180 MMHG | WEIGHT: 270 LBS | DIASTOLIC BLOOD PRESSURE: 92 MMHG | HEART RATE: 86 BPM | BODY MASS INDEX: 44.98 KG/M2 | HEIGHT: 65 IN

## 2024-05-02 DIAGNOSIS — M47.816 LUMBAR SPONDYLOSIS: ICD-10-CM

## 2024-05-02 DIAGNOSIS — R56.9 SEIZURE: ICD-10-CM

## 2024-05-02 DIAGNOSIS — G40.909 NONINTRACTABLE EPILEPSY WITHOUT STATUS EPILEPTICUS, UNSPECIFIED EPILEPSY TYPE: ICD-10-CM

## 2024-05-02 DIAGNOSIS — F41.9 ANXIETY: Primary | ICD-10-CM

## 2024-05-02 PROCEDURE — 3080F DIAST BP >= 90 MM HG: CPT | Mod: CPTII,S$GLB,, | Performed by: PSYCHIATRY & NEUROLOGY

## 2024-05-02 PROCEDURE — 4010F ACE/ARB THERAPY RXD/TAKEN: CPT | Mod: CPTII,S$GLB,, | Performed by: PSYCHIATRY & NEUROLOGY

## 2024-05-02 PROCEDURE — 3077F SYST BP >= 140 MM HG: CPT | Mod: CPTII,S$GLB,, | Performed by: PSYCHIATRY & NEUROLOGY

## 2024-05-02 PROCEDURE — 99999 PR PBB SHADOW E&M-EST. PATIENT-LVL V: CPT | Mod: PBBFAC,,, | Performed by: PSYCHIATRY & NEUROLOGY

## 2024-05-02 PROCEDURE — 1159F MED LIST DOCD IN RCRD: CPT | Mod: CPTII,S$GLB,, | Performed by: PSYCHIATRY & NEUROLOGY

## 2024-05-02 PROCEDURE — 99214 OFFICE O/P EST MOD 30 MIN: CPT | Mod: S$GLB,,, | Performed by: PSYCHIATRY & NEUROLOGY

## 2024-05-02 PROCEDURE — 3008F BODY MASS INDEX DOCD: CPT | Mod: CPTII,S$GLB,, | Performed by: PSYCHIATRY & NEUROLOGY

## 2024-05-02 RX ORDER — TOPIRAMATE 100 MG/1
150 TABLET, FILM COATED ORAL 2 TIMES DAILY
Qty: 270 TABLET | Refills: 3 | Status: SHIPPED | OUTPATIENT
Start: 2024-05-02

## 2024-05-02 NOTE — PROGRESS NOTES
Chief Complaint   Patient presents with    Seizures     Pt has had 16 since last OFV, they have lasted about 15 minutes, pt  states she'll start mumbling, her right leg will go out, she'll extend her hands and open her eyes really wide, she lost consciousness she doesn't remember anything. She gets bad anxiety after her seizure, she gets scared to be alone. Most recent seizure was yesterday, denies missing medications.         This is a 63 y.o. female here for follow up for seizures.  She has had an increased frequency of seizures.  She reports 16 seizures since her last office visit in her last seizure was yesterday.  Seizures are characterized as right leg jerking, staring off followed by postictal sleepiness for about an hour.  They are often triggered by stress.  Recall at last visit she was very concerned about lower back pain so MRI L-spine was ordered.  MRI lumbar spine showed lumbar spondylosis and facet disease primarily at L4-L5 and L5-S1 with bilateral neuroforaminal stenosis and lateral recess encroachment.  She was referred to Dr. Razo for possible evaluation for LESI but the patient did not follow-up.  She is convinced that she needs something called [facility] that she saw advertised on Facebook.  She reports that this is a care for her back pain.  For this reason she did not follow up in the pain Clinic.  Sleep is still an issue.  Anxiety has become worse.  Recall she is on Effexor and has longstanding anxiety but has not seen Psychiatry.  She is asking for a letter that can be written to apply for a home health aide given the amount of seizures she has    Medication List with Changes/Refills   Current Medications    ALBUTEROL (PROVENTIL/VENTOLIN HFA) 90 MCG/ACTUATION INHALER    INHALE 2 PUFFS INTO THE LUNGS EVERY 6 HOURS AS NEEDED FOR WHEEZE    ALBUTEROL-IPRATROPIUM (DUO-NEB) 2.5 MG-0.5 MG/3 ML NEBULIZER SOLUTION    Take 3 mLs by nebulization every 6 (six) hours as needed for Wheezing. Rescue     CHOLESTYRAMINE (QUESTRAN) 4 GRAM PACKET    Take by mouth.    CLONAZEPAM (KLONOPIN) 1 MG TABLET    Take 0.5 tablets (0.5 mg total) by mouth every evening.    ESOMEPRAZOLE (NEXIUM) 40 MG CAPSULE    Take 40 mg by mouth once daily.    FAMOTIDINE (PEPCID) 40 MG TABLET    Take 40 mg by mouth 2 (two) times daily.    FLUTICASONE PROPIONATE (FLONASE) 50 MCG/ACTUATION NASAL SPRAY    1 spray by Each Nostril route.    GABAPENTIN (NEURONTIN) 300 MG CAPSULE    Take 1 capsule (300 mg total) by mouth every evening.    HYDROXYZINE PAMOATE (VISTARIL) 25 MG CAP    TAKE 2 CAPSULES BY MOUTH 2 TIMES A DAY.    LACOSAMIDE (VIMPAT) 200 MG TAB TABLET    Take 1 tablet (200 mg total) by mouth every 12 (twelve) hours.    LATANOPROST 0.005 % OPHTHALMIC SOLUTION    Place 1 drop into both eyes nightly.    LEVOCETIRIZINE (XYZAL) 5 MG TABLET    Take 1 tablet by mouth every evening.    LISINOPRIL (PRINIVIL,ZESTRIL) 40 MG TABLET    Take 1 tablet (40 mg total) by mouth once daily.    MIRABEGRON (MYRBETRIQ) 50 MG TB24    Take 50 mg by mouth.    NEBULIZER AND COMPRESSOR TEETEE    1 each by Misc.(Non-Drug; Combo Route) route every 6 (six) hours as needed (wheezing).    ONDANSETRON (ZOFRAN-ODT) 8 MG TBDL    Take 8 mg by mouth every 8 (eight) hours as needed.    OPTICHAMBER ADULT MASK-LARGE TEETEE    USE AS DIRECTED WITH SYMBICORT    PANTOPRAZOLE (PROTONIX) 40 MG TABLET    Take 40 mg by mouth once daily.    SYMBICORT 160-4.5 MCG/ACTUATION HFAA    INHALE 2 PUFFS INTO THE LUNGS EVERY 12 HOURS.    VENLAFAXINE (EFFEXOR-XR) 37.5 MG 24 HR CAPSULE    TAKE 1 CAPSULE (37.5 MG TOTAL) BY MOUTH ONCE DAILY. TO TAKE WITH 75MG ONCE DAILY    VENLAFAXINE (EFFEXOR-XR) 75 MG 24 HR CAPSULE    TAKE 1 CAPSULE BY MOUTH EVERY DAY   Changed and/or Refilled Medications    Modified Medication Previous Medication    TOPIRAMATE (TOPAMAX) 100 MG TABLET topiramate (TOPAMAX) 100 MG tablet       Take 1.5 tablets (150 mg total) by mouth 2 (two) times daily.    TAKE 1 TABLET BY MOUTH TWICE A  "DAY        Vitals:    05/02/24 1131   BP: (!) 180/92   Pulse: 86        NAD  Alert and oriented  Cognition and perception intact  No aphasia  EOMI  No facial asymmetry  No dysarthria  Moves all extremities symmetrically  No gross coordination abnormalities  Gait normal     1. Anxiety  -     Ambulatory referral/consult to Psychiatry    2. Nonintractable epilepsy without status epilepticus, unspecified epilepsy type  Overview:  Has had seizures since the age of 13. Initial seizure was in the setting of acute illness with hydronephrosis and continued to have seizures frequently. Used to stare and throw dolls at her sister and did not know they were seizures.  SHe used to have 30-40 seizures a month. Currently seizure frequency is about 1 every 6 months. She is on vimpat 200 BID, topamax 200 BID. She also takes clorazepate, clonazepam 1 BID. She has 2 types of seizures. First type is characterized by starting with "lord sola help me" and she makes sign of cross, then stares, hits her leg, falls to ground if standing lasting for 1-2 minutes. has postictal confusion for 5 minutes to 15 minutes. Has never had a generalized tonic clonic seizure witnessed. Second type is nocturnal. She has had a seizure in her sleep and woke up with tongue bitten. Overall she is very happy with her seizure frequency currently as it is much improved. She does complain of short-term memory problems, which she attributes to the medications.      Orders:  -     Ambulatory referral/consult to Neurology  -     EMU Monitoring; Future    3. Seizure  -     topiramate (TOPAMAX) 100 MG tablet; Take 1.5 tablets (150 mg total) by mouth 2 (two) times daily.  Dispense: 270 tablet; Refill: 3     Increase Topamax to 150 twice daily, continue Vimpat 200 twice daily.  EEG monitoring to characterize events  Psychiatry referral given generalized anxiety  Pain clinic referral for DAWIT"

## 2024-05-03 ENCOUNTER — TELEPHONE (OUTPATIENT)
Dept: NEUROLOGY | Facility: CLINIC | Age: 64
End: 2024-05-03
Payer: MEDICARE

## 2024-05-04 DIAGNOSIS — J45.40 MODERATE PERSISTENT ASTHMA WITHOUT COMPLICATION: ICD-10-CM

## 2024-05-04 DIAGNOSIS — R56.9 SEIZURE: ICD-10-CM

## 2024-05-06 RX ORDER — HYDROXYZINE PAMOATE 25 MG/1
50 CAPSULE ORAL 2 TIMES DAILY
Qty: 360 CAPSULE | Refills: 3 | Status: SHIPPED | OUTPATIENT
Start: 2024-05-06

## 2024-05-06 RX ORDER — BUDESONIDE AND FORMOTEROL FUMARATE DIHYDRATE 160; 4.5 UG/1; UG/1
2 AEROSOL RESPIRATORY (INHALATION) EVERY 12 HOURS
Qty: 6 G | Refills: 3 | Status: SHIPPED | OUTPATIENT
Start: 2024-05-06 | End: 2024-05-28 | Stop reason: ALTCHOICE

## 2024-05-06 RX ORDER — TOPIRAMATE 100 MG/1
100 TABLET, FILM COATED ORAL 2 TIMES DAILY
Qty: 180 TABLET | Refills: 1 | OUTPATIENT
Start: 2024-05-06

## 2024-05-09 ENCOUNTER — TELEPHONE (OUTPATIENT)
Dept: NEUROLOGY | Facility: CLINIC | Age: 64
End: 2024-05-09
Payer: MEDICARE

## 2024-05-14 ENCOUNTER — OFFICE VISIT (OUTPATIENT)
Dept: FAMILY MEDICINE | Facility: CLINIC | Age: 64
End: 2024-05-14
Payer: MEDICARE

## 2024-05-14 VITALS
DIASTOLIC BLOOD PRESSURE: 84 MMHG | OXYGEN SATURATION: 96 % | WEIGHT: 276.88 LBS | RESPIRATION RATE: 18 BRPM | HEART RATE: 84 BPM | SYSTOLIC BLOOD PRESSURE: 143 MMHG | BODY MASS INDEX: 46.13 KG/M2 | TEMPERATURE: 99 F | HEIGHT: 65 IN

## 2024-05-14 DIAGNOSIS — G89.29 CHRONIC BACK PAIN, UNSPECIFIED BACK LOCATION, UNSPECIFIED BACK PAIN LATERALITY: ICD-10-CM

## 2024-05-14 DIAGNOSIS — M54.9 CHRONIC BACK PAIN, UNSPECIFIED BACK LOCATION, UNSPECIFIED BACK PAIN LATERALITY: ICD-10-CM

## 2024-05-14 DIAGNOSIS — G40.909 NONINTRACTABLE EPILEPSY WITHOUT STATUS EPILEPTICUS, UNSPECIFIED EPILEPSY TYPE: Primary | ICD-10-CM

## 2024-05-14 PROCEDURE — 1159F MED LIST DOCD IN RCRD: CPT | Mod: CPTII,,, | Performed by: FAMILY MEDICINE

## 2024-05-14 PROCEDURE — 1160F RVW MEDS BY RX/DR IN RCRD: CPT | Mod: CPTII,,, | Performed by: FAMILY MEDICINE

## 2024-05-14 PROCEDURE — 3077F SYST BP >= 140 MM HG: CPT | Mod: CPTII,,, | Performed by: FAMILY MEDICINE

## 2024-05-14 PROCEDURE — 3079F DIAST BP 80-89 MM HG: CPT | Mod: CPTII,,, | Performed by: FAMILY MEDICINE

## 2024-05-14 PROCEDURE — 3008F BODY MASS INDEX DOCD: CPT | Mod: CPTII,,, | Performed by: FAMILY MEDICINE

## 2024-05-14 PROCEDURE — 4010F ACE/ARB THERAPY RXD/TAKEN: CPT | Mod: CPTII,,, | Performed by: FAMILY MEDICINE

## 2024-05-14 PROCEDURE — 99214 OFFICE O/P EST MOD 30 MIN: CPT | Mod: ,,, | Performed by: FAMILY MEDICINE

## 2024-05-14 RX ORDER — BUDESONIDE 0.5 MG/2ML
0.5 INHALANT ORAL DAILY PRN
COMMUNITY
Start: 2024-05-13 | End: 2024-06-18

## 2024-05-14 NOTE — PROGRESS NOTES
Subjective:      Patient ID: Christie Marcum is a 63 y.o. female.    Chief Complaint: Seizures (Patient reports increase in seizure activity. Concerned about weight gain on vimpat. Patient reports back pain causing seizures.)    Disclaimer:  This note is prepared using voice recognition software and as such is likely to have errors despite attempts at proofreading. Please contact me for questions.     63-year-old female who presents for follow-up.  Patient reports increased number of seizures recently. She states she lower back pain may be the trigger for increased seizure activity as well as increased noise exposure, and anxiousness.    She is being followed by Neurology however has scheduled an appointment for 2nd opinion.  The patient states that since starting Vimpat she has had weight gain and states that she is felt jittery.  She states last EEG multiple years ago however she is attempting to decrease the amount of medication she is on.  She has been decreasing clonazepam.    The patient was referred to Psychiatry by her neurologist however states that she declined the visit as she does not believe her seizure activity is associated with anxiousness.    The patient has been referred to pain management, Dr. Ramírez for further evaluation and discussion of injections but has not had an appointment scheduled. She has had improvement in LE pain with gabapentin but is has not decreased lower back pain.       Past Medical History:   Diagnosis Date    Anxiety     Arthritis     Asthma     Cancer of splenic flexure     Decreased hearing of left ear 12/07/2022    Depressive disorder 07/05/2022    Epilepsy 07/05/2022    Has had seizures since the age of 13. Initial seizure was in the setting of acute illness with hydronephrosis and continued to have seizures frequently. Used to stare and throw dolls at her sister and did not know they were seizures.  SHe used to have 30-40 seizures a month. Currently seizure frequency  is about 1 every 6 months. She is on vimpat 200 BID, topamax 200 BID. She also takes clorazepate    Gastroesophageal reflux disease 07/05/2022    Hypertension     IGT (impaired glucose tolerance)     Low back pain 09/21/2022    Moderate persistent asthma without complication 08/25/2022    Neuropathy 07/05/2022    On home oxygen therapy 09/21/2022    KHANH on CPAP     Overactive bladder 07/05/2022        Current Outpatient Medications on File Prior to Visit   Medication Sig Dispense Refill    albuterol (PROVENTIL/VENTOLIN HFA) 90 mcg/actuation inhaler INHALE 2 PUFFS INTO THE LUNGS EVERY 6 HOURS AS NEEDED FOR WHEEZE 18 g 11    albuterol-ipratropium (DUO-NEB) 2.5 mg-0.5 mg/3 mL nebulizer solution Take 3 mLs by nebulization every 6 (six) hours as needed for Wheezing. Rescue 75 mL 0    budesonide (PULMICORT) 0.5 mg/2 mL nebulizer solution Take 0.5 mg by nebulization daily as needed.      cholestyramine (QUESTRAN) 4 gram packet Take by mouth.      clonazePAM (KLONOPIN) 1 MG tablet Take 0.5 tablets (0.5 mg total) by mouth every evening. 15 tablet 5    fluticasone propionate (FLONASE) 50 mcg/actuation nasal spray 1 spray by Each Nostril route.      gabapentin (NEURONTIN) 300 MG capsule Take 1 capsule (300 mg total) by mouth every evening. 30 capsule 11    hydrOXYzine pamoate (VISTARIL) 25 MG Cap TAKE 2 CAPSULES BY MOUTH TWICE A  capsule 3    lacosamide (VIMPAT) 200 mg Tab tablet Take 1 tablet (200 mg total) by mouth every 12 (twelve) hours. 60 tablet 5    latanoprost 0.005 % ophthalmic solution Place 1 drop into both eyes nightly.      lisinopriL (PRINIVIL,ZESTRIL) 40 MG tablet Take 1 tablet (40 mg total) by mouth once daily. 90 tablet 3    nebulizer and compressor Eli 1 each by Misc.(Non-Drug; Combo Route) route every 6 (six) hours as needed (wheezing). 1 each 0    ondansetron (ZOFRAN-ODT) 8 MG TbDL Take 8 mg by mouth every 8 (eight) hours as needed.      OPTICHAMBER ADULT MASK-LARGE Eli USE AS DIRECTED WITH  SYMBICORT      SYMBICORT 160-4.5 mcg/actuation HFAA INHALE 2 PUFFS INTO THE LUNGS EVERY 12 HOURS. 6 g 3    topiramate (TOPAMAX) 100 MG tablet Take 1.5 tablets (150 mg total) by mouth 2 (two) times daily. 270 tablet 3    venlafaxine (EFFEXOR-XR) 37.5 MG 24 hr capsule TAKE 1 CAPSULE (37.5 MG TOTAL) BY MOUTH ONCE DAILY. TO TAKE WITH 75MG ONCE DAILY 90 capsule 3    venlafaxine (EFFEXOR-XR) 75 MG 24 hr capsule TAKE 1 CAPSULE BY MOUTH EVERY DAY 90 capsule 3    esomeprazole (NEXIUM) 40 MG capsule Take 40 mg by mouth once daily. (Patient not taking: Reported on 5/14/2024)      famotidine (PEPCID) 40 MG tablet Take 40 mg by mouth 2 (two) times daily. (Patient not taking: Reported on 5/2/2024)      mirabegron (MYRBETRIQ) 50 mg Tb24 Take 50 mg by mouth. (Patient not taking: Reported on 5/14/2024)      pantoprazole (PROTONIX) 40 MG tablet Take 40 mg by mouth once daily. (Patient not taking: Reported on 5/2/2024)       No current facility-administered medications on file prior to visit.        Review of patient's allergies indicates:   Allergen Reactions    Cephalexin      Other reaction(s): Seizure  hallucinations    Other reaction(s): Unknown    Ciprofloxacin      Other reaction(s): Seiurus  Other reaction(s): Unknown    Codeine      Other reaction(s): Seizure  seizure      Metronidazole      Other reaction(s): Seizure    Metronidazole hcl      Other reaction(s): Unknown    Naproxen      Other reaction(s): Seizure  Nausea/seizure      Pantoprazole sodium      Other reaction(s): Unknown    Tetracycline      Other reaction(s): Seizure  seizure      Tramadol Other (See Comments)     Other reaction(s): Seizure  SEIZURES HALLUCINATION          Review of Systems   Constitutional:  Negative for chills, diaphoresis and fever.   Eyes:  Negative for blurred vision and double vision.   Respiratory:  Negative for cough and shortness of breath.    Cardiovascular:  Negative for chest pain.   Gastrointestinal:  Negative for abdominal pain,  "diarrhea, nausea and vomiting.   Musculoskeletal:  Positive for back pain.   Skin:  Negative for rash.   Neurological:  Positive for seizures and loss of consciousness. Negative for headaches.       Objective:     Vitals:    05/14/24 0855 05/14/24 0929   BP: (!) 156/86 (!) 143/84   BP Location: Right arm Right arm   Patient Position: Sitting Sitting   Pulse: 84    Resp: 18    Temp: 98.7 °F (37.1 °C)    TempSrc: Oral    SpO2: 96%    Weight: 125.6 kg (276 lb 14.4 oz)    Height: 5' 5" (1.651 m)      Physical Exam  Vitals reviewed.   Constitutional:       General: She is not in acute distress.     Appearance: Normal appearance. She is not ill-appearing, toxic-appearing or diaphoretic.   HENT:      Head: Normocephalic.   Eyes:      General:         Right eye: No discharge.         Left eye: No discharge.      Extraocular Movements: Extraocular movements intact.      Conjunctiva/sclera: Conjunctivae normal.   Cardiovascular:      Rate and Rhythm: Normal rate and regular rhythm.      Pulses: Normal pulses.      Heart sounds: Normal heart sounds. No murmur heard.     No friction rub. No gallop.   Pulmonary:      Effort: Pulmonary effort is normal. No respiratory distress.      Breath sounds: Normal breath sounds. No stridor. No wheezing, rhonchi or rales.   Musculoskeletal:         General: Normal range of motion.      Cervical back: Normal range of motion and neck supple. No rigidity.   Skin:     General: Skin is warm and dry.      Coloration: Skin is not pale.   Neurological:      General: No focal deficit present.      Mental Status: She is alert and oriented to person, place, and time. Mental status is at baseline.   Psychiatric:         Mood and Affect: Mood normal.         Behavior: Behavior normal.         Thought Content: Thought content normal.         Judgment: Judgment normal.         Assessment:     1. Nonintractable epilepsy without status epilepticus, unspecified epilepsy type    2. Chronic back pain  "     Plan:     1. Nonintractable epilepsy without status epilepticus, unspecified epilepsy type  Continue current medications.  Keep follow up with neurology, patient has scheduled appointment for 2nd opinion.  Keep seizure diary.  Avoid triggers.  Strict ED precautions for recurrent seizure activity.  Patient is hesitant to have additional medication adjustments.    2. Chronic back pain  Referral to pain management sent 05/02/2024 by neurology.  Status of referral inquiry to be made.  Continue current medications.  Avoid heavy lifting.  Consider muscle relaxant.   Gentle stretching as tolerated.  Denies improvement after physical therapy.      Follow up in about 2 months (around 7/14/2024).  Christie Marcum was given education on their disease process and medications.

## 2024-05-26 DIAGNOSIS — G40.209 PARTIAL SYMPTOMATIC EPILEPSY WITH COMPLEX PARTIAL SEIZURES, NOT INTRACTABLE, WITHOUT STATUS EPILEPTICUS: ICD-10-CM

## 2024-05-27 ENCOUNTER — TELEPHONE (OUTPATIENT)
Dept: NEUROLOGY | Facility: CLINIC | Age: 64
End: 2024-05-27
Payer: MEDICARE

## 2024-05-28 ENCOUNTER — OFFICE VISIT (OUTPATIENT)
Dept: FAMILY MEDICINE | Facility: CLINIC | Age: 64
End: 2024-05-28
Payer: MEDICARE

## 2024-05-28 VITALS
DIASTOLIC BLOOD PRESSURE: 82 MMHG | OXYGEN SATURATION: 97 % | HEIGHT: 65 IN | SYSTOLIC BLOOD PRESSURE: 152 MMHG | HEART RATE: 87 BPM | RESPIRATION RATE: 18 BRPM | WEIGHT: 275.63 LBS | BODY MASS INDEX: 45.92 KG/M2 | TEMPERATURE: 98 F

## 2024-05-28 DIAGNOSIS — M25.561 CHRONIC PAIN OF RIGHT KNEE: Primary | ICD-10-CM

## 2024-05-28 DIAGNOSIS — G89.29 CHRONIC PAIN OF RIGHT KNEE: Primary | ICD-10-CM

## 2024-05-28 DIAGNOSIS — I10 PRIMARY HYPERTENSION: ICD-10-CM

## 2024-05-28 DIAGNOSIS — N18.31 CHRONIC KIDNEY DISEASE, STAGE 3A: ICD-10-CM

## 2024-05-28 PROCEDURE — 1159F MED LIST DOCD IN RCRD: CPT | Mod: CPTII,,, | Performed by: NURSE PRACTITIONER

## 2024-05-28 PROCEDURE — 3077F SYST BP >= 140 MM HG: CPT | Mod: CPTII,,, | Performed by: NURSE PRACTITIONER

## 2024-05-28 PROCEDURE — 3008F BODY MASS INDEX DOCD: CPT | Mod: CPTII,,, | Performed by: NURSE PRACTITIONER

## 2024-05-28 PROCEDURE — 4010F ACE/ARB THERAPY RXD/TAKEN: CPT | Mod: CPTII,,, | Performed by: NURSE PRACTITIONER

## 2024-05-28 PROCEDURE — 99214 OFFICE O/P EST MOD 30 MIN: CPT | Mod: ,,, | Performed by: NURSE PRACTITIONER

## 2024-05-28 PROCEDURE — 1160F RVW MEDS BY RX/DR IN RCRD: CPT | Mod: CPTII,,, | Performed by: NURSE PRACTITIONER

## 2024-05-28 PROCEDURE — 3079F DIAST BP 80-89 MM HG: CPT | Mod: CPTII,,, | Performed by: NURSE PRACTITIONER

## 2024-05-28 RX ORDER — CLONAZEPAM 1 MG/1
0.5 TABLET ORAL NIGHTLY
Qty: 15 TABLET | Refills: 2 | Status: SHIPPED | OUTPATIENT
Start: 2024-05-28

## 2024-05-28 RX ORDER — AMLODIPINE BESYLATE 5 MG/1
5 TABLET ORAL DAILY
Qty: 90 TABLET | Refills: 3 | Status: SHIPPED | OUTPATIENT
Start: 2024-05-28 | End: 2025-05-28

## 2024-05-28 NOTE — ASSESSMENT & PLAN NOTE
OTC Tylenol prn  Knee brace prn  XR today  Refer to Ortho for evaluation  Pt is agreeable to plan and verbalizes understanding

## 2024-05-28 NOTE — PROGRESS NOTES
Subjective:      Patient ID: Christie Marcum is a 63 y.o. Black or  female      Chief Complaint: Right Knee/Foot swelling       Past Medical History:   Diagnosis Date    Anxiety     Arthritis     Asthma     Cancer of splenic flexure     CKD (chronic kidney disease)     Decreased hearing of left ear 12/07/2022    Depressive disorder 07/05/2022    Epilepsy 07/05/2022    Has had seizures since the age of 13. Initial seizure was in the setting of acute illness with hydronephrosis and continued to have seizures frequently. Used to stare and throw dolls at her sister and did not know they were seizures.  SHe used to have 30-40 seizures a month. Currently seizure frequency is about 1 every 6 months. She is on vimpat 200 BID, topamax 200 BID. She also takes clorazepate    Gastroesophageal reflux disease 07/05/2022    Hypertension     IGT (impaired glucose tolerance)     Low back pain 09/21/2022    Moderate persistent asthma without complication 08/25/2022    Neuropathy 07/05/2022    On home oxygen therapy 09/21/2022    KHANH on CPAP     Overactive bladder 07/05/2022        HPI  Knee Pain   The pain is present in the right knee. Quality: Throbbing. The pain is at a severity of 8/10. The pain is moderate. Pertinent negatives include no muscle weakness, numbness or tingling. Associated symptoms comments: edema. The symptoms are aggravated by movement. She has tried NSAIDs for the symptoms. The treatment provided no relief.   Of note, GFR is 45; worsening.  Not following Nephrology.    HTN:  BP elevated today.  Currently taking Lisinopril 40 mg po daily.  Denies any headaches, weakness, dizziness, CP, or SOB. Denies any other problems.        Patient Care Team:  Mary Leon MD as PCP - General (Family Medicine)  Yamile Mortensen MD (Pulmonary Disease)  Cornelio Waggoner MD as Consulting Physician (Gastroenterology)  Indira Worley MD as Consulting Physician (Neurology)  Tahir Ahmadi MD as  Consulting Physician (Oncology)  Mitch Flor MD as Consulting Physician (Urology)      Review of Systems   Constitutional: Negative.  Negative for appetite change, chills and fever.   HENT: Negative.     Eyes: Negative.  Negative for discharge and redness.   Respiratory: Negative.  Negative for shortness of breath.    Cardiovascular: Negative.  Negative for chest pain.   Gastrointestinal: Negative.    Endocrine: Negative.    Genitourinary: Negative.    Integumentary:  Negative.   Allergic/Immunologic: Negative.    Neurological: Negative.  Negative for tingling and numbness.   Psychiatric/Behavioral: Negative.     All other systems reviewed and are negative.          Objective:      Vitals:    05/28/24 0832   BP: (!) 152/82   Pulse:    Resp:    Temp:       Body mass index is 45.86 kg/m².     Physical Exam  Vitals reviewed.   Constitutional:       Appearance: Normal appearance.   HENT:      Head: Normocephalic.      Mouth/Throat:      Mouth: Mucous membranes are moist.   Eyes:      Conjunctiva/sclera: Conjunctivae normal.      Pupils: Pupils are equal, round, and reactive to light.   Cardiovascular:      Rate and Rhythm: Normal rate and regular rhythm.   Pulmonary:      Effort: Pulmonary effort is normal. No respiratory distress.      Breath sounds: Normal breath sounds.   Musculoskeletal:         General: Normal range of motion.      Cervical back: Normal range of motion and neck supple.      Right knee: Crepitus present. Normal range of motion. No tenderness.      Left knee: Normal range of motion. No tenderness.   Skin:     General: Skin is warm and dry.   Neurological:      Mental Status: She is alert and oriented to person, place, and time.   Psychiatric:         Mood and Affect: Mood normal.            Current Outpatient Medications:     budesonide (PULMICORT) 0.5 mg/2 mL nebulizer solution, Take 0.5 mg by nebulization daily as needed., Disp: , Rfl:     cholestyramine (QUESTRAN) 4 gram packet, Take  by mouth., Disp: , Rfl:     clonazePAM (KLONOPIN) 1 MG tablet, TAKE 0.5 TABLETS (0.5 MG TOTAL) BY MOUTH EVERY EVENING., Disp: 15 tablet, Rfl: 2    fluticasone propionate (FLONASE) 50 mcg/actuation nasal spray, 1 spray by Each Nostril route., Disp: , Rfl:     gabapentin (NEURONTIN) 300 MG capsule, Take 1 capsule (300 mg total) by mouth every evening., Disp: 30 capsule, Rfl: 11    hydrOXYzine pamoate (VISTARIL) 25 MG Cap, TAKE 2 CAPSULES BY MOUTH TWICE A DAY, Disp: 360 capsule, Rfl: 3    lacosamide (VIMPAT) 200 mg Tab tablet, Take 1 tablet (200 mg total) by mouth every 12 (twelve) hours., Disp: 60 tablet, Rfl: 5    latanoprost 0.005 % ophthalmic solution, Place 1 drop into both eyes nightly., Disp: , Rfl:     lisinopriL (PRINIVIL,ZESTRIL) 40 MG tablet, Take 1 tablet (40 mg total) by mouth once daily., Disp: 90 tablet, Rfl: 3    nebulizer and compressor Eli, 1 each by Misc.(Non-Drug; Combo Route) route every 6 (six) hours as needed (wheezing)., Disp: 1 each, Rfl: 0    ondansetron (ZOFRAN-ODT) 8 MG TbDL, Take 8 mg by mouth every 8 (eight) hours as needed., Disp: , Rfl:     OPTICHAMBER ADULT MASK-LARGE Eli, USE AS DIRECTED WITH SYMBICORT, Disp: , Rfl:     topiramate (TOPAMAX) 100 MG tablet, Take 1.5 tablets (150 mg total) by mouth 2 (two) times daily., Disp: 270 tablet, Rfl: 3    venlafaxine (EFFEXOR-XR) 37.5 MG 24 hr capsule, TAKE 1 CAPSULE (37.5 MG TOTAL) BY MOUTH ONCE DAILY. TO TAKE WITH 75MG ONCE DAILY, Disp: 90 capsule, Rfl: 3    venlafaxine (EFFEXOR-XR) 75 MG 24 hr capsule, TAKE 1 CAPSULE BY MOUTH EVERY DAY, Disp: 90 capsule, Rfl: 3    albuterol (PROVENTIL/VENTOLIN HFA) 90 mcg/actuation inhaler, INHALE 2 PUFFS INTO THE LUNGS EVERY 6 HOURS AS NEEDED FOR WHEEZE, Disp: 18 g, Rfl: 11    amLODIPine (NORVASC) 5 MG tablet, Take 1 tablet (5 mg total) by mouth once daily., Disp: 90 tablet, Rfl: 3    mirabegron (MYRBETRIQ) 50 mg Tb24, Take 50 mg by mouth., Disp: , Rfl:     SYMBICORT 160-4.5 mcg/actuation HFAA, INHALE 2  PUFFS INTO THE LUNGS EVERY 12 HOURS., Disp: 6 g, Rfl: 3    Assessment & Plan:     Problem List Items Addressed This Visit          Cardiac/Vascular    Hypertension     BP elevated; Asymptomatic  Currently taking Lisinopril 40 mg po daily.   Continue Lisinopril  Start Amlodipine 5 mg po daily  Instructed to take BP meds prior to all clinic appointments  Instructed to report to ED for any BP >200/100, SOB, CP, and/or worsening symptoms  Daily BP check; bring log to all appointments  RTC in 2 weeks for reevaluation  Verbalizes all understanding           Relevant Medications    amLODIPine (NORVASC) 5 MG tablet       Renal/    Chronic kidney disease, stage 3a     Worsening  Please stop NSAIDS   Refer to Nephrology for evaluation         Relevant Orders    Ambulatory referral/consult to Nephrology       Orthopedic    Chronic pain of right knee - Primary     OTC Tylenol prn  Knee brace prn  XR today  Refer to Ortho for evaluation  Pt is agreeable to plan and verbalizes understanding         Relevant Orders    Ambulatory referral/consult to Orthopedics    X-Ray Knee 1 or 2 View Right

## 2024-05-28 NOTE — ASSESSMENT & PLAN NOTE
BP elevated; Asymptomatic  Currently taking Lisinopril 40 mg po daily.   Continue Lisinopril  Start Amlodipine 5 mg po daily  Instructed to take BP meds prior to all clinic appointments  Instructed to report to ED for any BP >200/100, SOB, CP, and/or worsening symptoms  Daily BP check; bring log to all appointments  RTC in 2 weeks for reevaluation  Verbalizes all understanding

## 2024-05-30 ENCOUNTER — TELEPHONE (OUTPATIENT)
Dept: NEUROLOGY | Facility: CLINIC | Age: 64
End: 2024-05-30
Payer: MEDICARE

## 2024-05-30 NOTE — TELEPHONE ENCOUNTER
Returned call regarding voice mail trying to schedule her EMU admission. Her spouse answered the phone and said she just had a seizure, she's ok; they already spoke with the Dr's ofc, but she is resting right now. Will call me back. Left my direct line as POC

## 2024-06-03 RX ORDER — VENLAFAXINE HYDROCHLORIDE 37.5 MG/1
37.5 CAPSULE, EXTENDED RELEASE ORAL DAILY
Qty: 90 CAPSULE | Refills: 3 | Status: SHIPPED | OUTPATIENT
Start: 2024-06-03 | End: 2025-06-03

## 2024-06-06 ENCOUNTER — TELEPHONE (OUTPATIENT)
Dept: NEUROLOGY | Facility: CLINIC | Age: 64
End: 2024-06-06
Payer: MEDICARE

## 2024-06-06 ENCOUNTER — HOSPITAL ENCOUNTER (OUTPATIENT)
Dept: RADIOLOGY | Facility: HOSPITAL | Age: 64
Discharge: HOME | End: 2024-06-06
Attending: NURSE PRACTITIONER
Payer: MEDICARE

## 2024-06-06 DIAGNOSIS — N18.31 CHRONIC KIDNEY DISEASE, STAGE 3A: Primary | ICD-10-CM

## 2024-06-06 DIAGNOSIS — M25.561 CHRONIC PAIN OF RIGHT KNEE: ICD-10-CM

## 2024-06-06 DIAGNOSIS — G89.29 CHRONIC PAIN OF RIGHT KNEE: ICD-10-CM

## 2024-06-06 PROCEDURE — 73560 X-RAY EXAM OF KNEE 1 OR 2: CPT | Mod: TC,RT

## 2024-06-12 ENCOUNTER — LAB VISIT (OUTPATIENT)
Dept: LAB | Facility: HOSPITAL | Age: 64
End: 2024-06-12
Payer: MEDICARE

## 2024-06-12 DIAGNOSIS — N18.31 CHRONIC KIDNEY DISEASE, STAGE 3A: ICD-10-CM

## 2024-06-12 LAB
ALBUMIN SERPL-MCNC: 3.5 G/DL (ref 3.4–4.8)
ALBUMIN/GLOB SERPL: 0.8 RATIO (ref 1.1–2)
ALP SERPL-CCNC: 62 UNIT/L (ref 40–150)
ALT SERPL-CCNC: 29 UNIT/L (ref 0–55)
ANION GAP SERPL CALC-SCNC: 10 MEQ/L
AST SERPL-CCNC: 23 UNIT/L (ref 5–34)
BASOPHILS # BLD AUTO: 0.03 X10(3)/MCL
BASOPHILS NFR BLD AUTO: 0.3 %
BILIRUB SERPL-MCNC: 0.4 MG/DL
BILIRUB UR QL STRIP.AUTO: NEGATIVE
BUN SERPL-MCNC: 17.6 MG/DL (ref 9.8–20.1)
CALCIUM SERPL-MCNC: 9.4 MG/DL (ref 8.4–10.2)
CHLORIDE SERPL-SCNC: 109 MMOL/L (ref 98–107)
CLARITY UR: CLEAR
CO2 SERPL-SCNC: 21 MMOL/L (ref 23–31)
COLOR UR AUTO: NORMAL
CREAT SERPL-MCNC: 1.2 MG/DL (ref 0.55–1.02)
CREAT UR-MCNC: 195.6 MG/DL (ref 45–106)
CREAT/UREA NIT SERPL: 15
EOSINOPHIL # BLD AUTO: 0.33 X10(3)/MCL (ref 0–0.9)
EOSINOPHIL NFR BLD AUTO: 3.7 %
ERYTHROCYTE [DISTWIDTH] IN BLOOD BY AUTOMATED COUNT: 12.7 % (ref 11.5–17)
GFR SERPLBLD CREATININE-BSD FMLA CKD-EPI: 51 ML/MIN/1.73/M2
GLOBULIN SER-MCNC: 4.5 GM/DL (ref 2.4–3.5)
GLUCOSE SERPL-MCNC: 102 MG/DL (ref 82–115)
GLUCOSE UR QL STRIP: NEGATIVE
HCT VFR BLD AUTO: 45.6 % (ref 37–47)
HGB BLD-MCNC: 14.3 G/DL (ref 12–16)
HGB UR QL STRIP: NEGATIVE
IMM GRANULOCYTES # BLD AUTO: 0.02 X10(3)/MCL (ref 0–0.04)
IMM GRANULOCYTES NFR BLD AUTO: 0.2 %
KETONES UR QL STRIP: NEGATIVE
LEUKOCYTE ESTERASE UR QL STRIP: NEGATIVE
LYMPHOCYTES # BLD AUTO: 1.52 X10(3)/MCL (ref 0.6–4.6)
LYMPHOCYTES NFR BLD AUTO: 17 %
MCH RBC QN AUTO: 28.3 PG (ref 27–31)
MCHC RBC AUTO-ENTMCNC: 31.4 G/DL (ref 33–36)
MCV RBC AUTO: 90.1 FL (ref 80–94)
MONOCYTES # BLD AUTO: 0.74 X10(3)/MCL (ref 0.1–1.3)
MONOCYTES NFR BLD AUTO: 8.3 %
NEUTROPHILS # BLD AUTO: 6.28 X10(3)/MCL (ref 2.1–9.2)
NEUTROPHILS NFR BLD AUTO: 70.5 %
NITRITE UR QL STRIP: NEGATIVE
NRBC BLD AUTO-RTO: 0 %
PH UR STRIP: 7 [PH]
PHOSPHATE SERPL-MCNC: 1.9 MG/DL (ref 2.3–4.7)
PLATELET # BLD AUTO: 273 X10(3)/MCL (ref 130–400)
PMV BLD AUTO: 9.5 FL (ref 7.4–10.4)
POTASSIUM SERPL-SCNC: 4.2 MMOL/L (ref 3.5–5.1)
PROT SERPL-MCNC: 8 GM/DL (ref 5.8–7.6)
PROT UR QL STRIP: NEGATIVE
PROT UR STRIP-MCNC: 7.5 MG/DL
RBC # BLD AUTO: 5.06 X10(6)/MCL (ref 4.2–5.4)
SODIUM SERPL-SCNC: 140 MMOL/L (ref 136–145)
SP GR UR STRIP.AUTO: 1.02 (ref 1–1.03)
URINE PROTEIN/CREATININE RATIO (OLG): 0
UROBILINOGEN UR STRIP-ACNC: 1
WBC # SPEC AUTO: 8.92 X10(3)/MCL (ref 4.5–11.5)

## 2024-06-12 PROCEDURE — 84100 ASSAY OF PHOSPHORUS: CPT

## 2024-06-12 PROCEDURE — 80053 COMPREHEN METABOLIC PANEL: CPT

## 2024-06-12 PROCEDURE — 81003 URINALYSIS AUTO W/O SCOPE: CPT

## 2024-06-12 PROCEDURE — 36415 COLL VENOUS BLD VENIPUNCTURE: CPT

## 2024-06-12 PROCEDURE — 85025 COMPLETE CBC W/AUTO DIFF WBC: CPT

## 2024-06-12 PROCEDURE — 84156 ASSAY OF PROTEIN URINE: CPT

## 2024-06-18 ENCOUNTER — OFFICE VISIT (OUTPATIENT)
Dept: NEPHROLOGY | Facility: CLINIC | Age: 64
End: 2024-06-18
Payer: MEDICARE

## 2024-06-18 VITALS
OXYGEN SATURATION: 95 % | BODY MASS INDEX: 44.88 KG/M2 | RESPIRATION RATE: 20 BRPM | HEART RATE: 86 BPM | DIASTOLIC BLOOD PRESSURE: 80 MMHG | HEIGHT: 65 IN | SYSTOLIC BLOOD PRESSURE: 140 MMHG | WEIGHT: 269.38 LBS | TEMPERATURE: 98 F

## 2024-06-18 DIAGNOSIS — N18.31 CHRONIC KIDNEY DISEASE, STAGE 3A: ICD-10-CM

## 2024-06-18 DIAGNOSIS — I10 PRIMARY HYPERTENSION: ICD-10-CM

## 2024-06-18 DIAGNOSIS — N18.31 STAGE 3A CHRONIC KIDNEY DISEASE: Primary | ICD-10-CM

## 2024-06-18 DIAGNOSIS — E66.01 CLASS 3 SEVERE OBESITY DUE TO EXCESS CALORIES WITH SERIOUS COMORBIDITY AND BODY MASS INDEX (BMI) OF 45.0 TO 49.9 IN ADULT: ICD-10-CM

## 2024-06-18 DIAGNOSIS — E87.20 METABOLIC ACIDOSIS: ICD-10-CM

## 2024-06-18 DIAGNOSIS — E83.39 HYPOPHOSPHATEMIA: ICD-10-CM

## 2024-06-18 PROCEDURE — 1159F MED LIST DOCD IN RCRD: CPT | Mod: CPTII,S$GLB,,

## 2024-06-18 PROCEDURE — 99203 OFFICE O/P NEW LOW 30 MIN: CPT | Mod: S$GLB,,,

## 2024-06-18 PROCEDURE — 3066F NEPHROPATHY DOC TX: CPT | Mod: CPTII,S$GLB,,

## 2024-06-18 PROCEDURE — 3079F DIAST BP 80-89 MM HG: CPT | Mod: CPTII,S$GLB,,

## 2024-06-18 PROCEDURE — 99999 PR PBB SHADOW E&M-EST. PATIENT-LVL V: CPT | Mod: PBBFAC,,,

## 2024-06-18 PROCEDURE — 4010F ACE/ARB THERAPY RXD/TAKEN: CPT | Mod: CPTII,S$GLB,,

## 2024-06-18 PROCEDURE — 3008F BODY MASS INDEX DOCD: CPT | Mod: CPTII,S$GLB,,

## 2024-06-18 PROCEDURE — 3077F SYST BP >= 140 MM HG: CPT | Mod: CPTII,S$GLB,,

## 2024-06-18 RX ORDER — IPRATROPIUM BROMIDE AND ALBUTEROL SULFATE 2.5; .5 MG/3ML; MG/3ML
SOLUTION RESPIRATORY (INHALATION)
COMMUNITY
Start: 2024-06-08

## 2024-06-18 RX ORDER — LEVOCETIRIZINE DIHYDROCHLORIDE 5 MG/1
5 TABLET, FILM COATED ORAL NIGHTLY
COMMUNITY
Start: 2024-06-16

## 2024-06-18 RX ORDER — ONDANSETRON 4 MG/1
4 TABLET, ORALLY DISINTEGRATING ORAL EVERY 8 HOURS PRN
COMMUNITY
Start: 2024-06-14

## 2024-06-18 RX ORDER — AMOXICILLIN AND CLAVULANATE POTASSIUM 875; 125 MG/1; MG/1
1 TABLET, FILM COATED ORAL 2 TIMES DAILY
COMMUNITY
Start: 2024-06-14

## 2024-06-18 NOTE — PROGRESS NOTES
[facility] New Referral Office Note    HPI  Christie Marcum, 63 y.o. female, presents to office as a new patient CKD evaluation.  Patient's history significant for hypertension.  Denies ever having diabetes, heart attack, stroke.  She has had cancer of splenic flexure which was removed, received a couple of months of chemotherapy a few years ago.  She reports recent diagnosis of diverticulosis after she went to walk-in clinic for vomiting and diarrhea x a few days.  Currently on Augmentin.  She reports staying well hydrated with water since then.   She has had seizures since she was a child, now maintained on Vimpat and Topamax.  Reports Keppra and Vimpat both made her gain a significant amount of weight.  She also tells me she was 10 years old she had a UTI that caused her to ultimately end up in a coma for a long period of time.  She denies ever having a UTI since then.      Medical Diagnoses:   Past Medical History:   Diagnosis Date    Anxiety     Arthritis     Asthma     Cancer of splenic flexure     CKD (chronic kidney disease)     Decreased hearing of left ear 12/07/2022    Depressive disorder 07/05/2022    Epilepsy 07/05/2022    Has had seizures since the age of 13. Initial seizure was in the setting of acute illness with hydronephrosis and continued to have seizures frequently. Used to stare and throw dolls at her sister and did not know they were seizures.  SHe used to have 30-40 seizures a month. Currently seizure frequency is about 1 every 6 months. She is on vimpat 200 BID, topamax 200 BID. She also takes clorazepate    Gastroesophageal reflux disease 07/05/2022    Hypertension     IGT (impaired glucose tolerance)     Low back pain 09/21/2022    Moderate persistent asthma without complication 08/25/2022    Neuropathy 07/05/2022    On home oxygen therapy 09/21/2022    KHANH on CPAP     Overactive bladder 07/05/2022     Patient Active Problem List   Diagnosis    Overactive bladder    Family history of  diabetes mellitus    Breast cancer screening by mammogram    Anxiety    Cancer of splenic flexure    Colon cancer    Depressive disorder    Epilepsy    Fibroadenoma of breast    Gastroesophageal reflux disease    Glaucoma    Hypertension    KHANH on CPAP    Class 3 severe obesity with serious comorbidity and body mass index (BMI) of 45.0 to 49.9 in adult    COPD (chronic obstructive pulmonary disease)    Neuropathy    Moderate persistent asthma without complication    Low back pain    Arthritis    Arthralgia of hip    Advanced care planning/counseling discussion    Seasonal allergies    Postmenopausal    Decreased hearing of left ear    Acute pain of both knees    Episodic confusion    Peripheral polyneuropathy    Chronic kidney disease, stage 3a    Chronic pain of right knee    Hypophosphatemia       Surgical History:   Past Surgical History:   Procedure Laterality Date    COLON SURGERY      COLONOSCOPY      COMPLEX CYSTOMETROGRAM      EMG      HYSTERECTOMY      Left breas biopsy      Right breast biopsy         Family History:   Family History   Problem Relation Name Age of Onset    Hypertension Mother      Stroke Mother      Hypertension Father      Stroke Father      Hyperlipidemia Sister      Kidney disease Sister         Social History:   Social History     Tobacco Use    Smoking status: Never     Passive exposure: Never    Smokeless tobacco: Never   Substance Use Topics    Alcohol use: Not Currently       Allergies:  Review of patient's allergies indicates:   Allergen Reactions    Cephalexin      Other reaction(s): Seizure  hallucinations    Other reaction(s): Unknown    Ciprofloxacin      Other reaction(s): Seiurus  Other reaction(s): Unknown    Codeine      Other reaction(s): Seizure  seizure      Metronidazole      Other reaction(s): Seizure    Metronidazole hcl      Other reaction(s): Unknown    Naproxen      Other reaction(s): Seizure  Nausea/seizure      Pantoprazole sodium      Other reaction(s): Unknown     Tetracycline      Other reaction(s): Seizure  seizure      Tramadol Other (See Comments)     Other reaction(s): Seizure  SEIZURES HALLUCINATION         Medications:    Current Outpatient Medications:     albuterol-ipratropium (DUO-NEB) 2.5 mg-0.5 mg/3 mL nebulizer solution, PLEASE SEE ATTACHED FOR DETAILED DIRECTIONS, Disp: , Rfl:     amLODIPine (NORVASC) 5 MG tablet, Take 1 tablet (5 mg total) by mouth once daily., Disp: 90 tablet, Rfl: 3    amoxicillin-clavulanate 875-125mg (AUGMENTIN) 875-125 mg per tablet, Take 1 tablet by mouth 2 (two) times daily., Disp: , Rfl:     budesonide (PULMICORT) 0.5 mg/2 mL nebulizer solution, Take 0.5 mg by nebulization daily as needed., Disp: , Rfl:     cholestyramine (QUESTRAN) 4 gram packet, Take by mouth as needed., Disp: , Rfl:     clonazePAM (KLONOPIN) 1 MG tablet, TAKE 0.5 TABLETS (0.5 MG TOTAL) BY MOUTH EVERY EVENING., Disp: 15 tablet, Rfl: 2    gabapentin (NEURONTIN) 300 MG capsule, Take 1 capsule (300 mg total) by mouth every evening., Disp: 30 capsule, Rfl: 11    hydrOXYzine pamoate (VISTARIL) 25 MG Cap, TAKE 2 CAPSULES BY MOUTH TWICE A DAY, Disp: 360 capsule, Rfl: 3    lacosamide (VIMPAT) 200 mg Tab tablet, Take 1 tablet (200 mg total) by mouth every 12 (twelve) hours., Disp: 60 tablet, Rfl: 5    latanoprost 0.005 % ophthalmic solution, Place 1 drop into both eyes nightly., Disp: , Rfl:     levocetirizine (XYZAL) 5 MG tablet, Take 5 mg by mouth every evening., Disp: , Rfl:     lisinopriL (PRINIVIL,ZESTRIL) 40 MG tablet, Take 1 tablet (40 mg total) by mouth once daily., Disp: 90 tablet, Rfl: 3    nebulizer and compressor Eli, 1 each by Misc.(Non-Drug; Combo Route) route every 6 (six) hours as needed (wheezing)., Disp: 1 each, Rfl: 0    ondansetron (ZOFRAN-ODT) 4 MG TbDL, Take 4 mg by mouth every 8 (eight) hours as needed., Disp: , Rfl:     OPTICHAMBER ADULT MASK-LARGE Eli, USE AS DIRECTED WITH SYMBICORT, Disp: , Rfl:     topiramate (TOPAMAX) 100 MG tablet, Take 1.5  "tablets (150 mg total) by mouth 2 (two) times daily., Disp: 270 tablet, Rfl: 3    venlafaxine (EFFEXOR-XR) 37.5 MG 24 hr capsule, TAKE 1 CAPSULE (37.5 MG TOTAL) BY MOUTH ONCE DAILY. TO TAKE WITH 75MG ONCE DAILY, Disp: 90 capsule, Rfl: 3    venlafaxine (EFFEXOR-XR) 75 MG 24 hr capsule, TAKE 1 CAPSULE BY MOUTH EVERY DAY, Disp: 90 capsule, Rfl: 3       Review of Systems:    Constitutional: Denies fever, fatigue, generalized weakness  Skin: Denies wounds, no rashes, no itching, no new skin lesions  Respiratory:  Denies cough, shortness of breath, or wheezing  Cardiovascular: Denies chest pain, palpitations, or swelling  Gastrointestional: + recent vomiting and diarrhea a few days before labs were drawn  Genitourinary: Denies dysuria, hematuria, foamy urine, or incontinence; reports able to empty bladder  Musculoskeletal: Denies back or flank pain  Neurological: Denies headaches, dizziness, paresthesias, tremors or focal weakness      Vital Signs:  BP (!) 140/80 (BP Location: Left arm, Patient Position: Sitting)   Pulse 86   Temp 97.5 °F (36.4 °C) (Temporal)   Resp 20   Ht 5' 5" (1.651 m)   Wt 122.2 kg (269 lb 6.4 oz)   LMP  (LMP Unknown)   SpO2 95%   BMI 44.83 kg/m²   Body mass index is 44.83 kg/m².      Physical Exam:    General: no acute distress, awake, alert  Eyes: conjunctiva clear, eyelids without swelling  HENT: atraumatic, oropharynx and nasal mucosa patent  Neck: supple, trache midline, full ROM, no JVD  Respiratory: equal, unlabored, clear to auscultation A/P  Cardiovascular: RRR without murmur or rub  Edema: none  Gastrointestinal: soft, non-tender, non-distended; positive bowel sounds; no masses to palpation; no ascites  Genitourinary: no CVA tenderness upon palpation  Musculoskeletal: ROM without new limitation or discomfort  Integumentary: warm, dry; no rashes, wounds, or skin lesions  Neurological: oriented x4, appropriate, no acute deficits; no asterixis      Labs:        Component Value " Date/Time     06/12/2024 1351     (H) 03/12/2024 0908     12/15/2023 0707    K 4.2 06/12/2024 1351    K 4.0 03/12/2024 0908    K 3.9 12/15/2023 0707     (H) 06/12/2024 1351     (H) 03/12/2024 0908     12/15/2023 0707    CO2 21 (L) 06/12/2024 1351    CO2 24 03/12/2024 0908    CO2 18 (L) 12/15/2023 0707    BUN 17.6 06/12/2024 1351    BUN 11.7 03/12/2024 0908    BUN 14 12/15/2023 0707    CREATININE 1.20 (H) 06/12/2024 1351    CREATININE 1.33 (H) 03/12/2024 0908    CREATININE 1.11 (H) 12/15/2023 0707    CREATININE 1.18 (H) 02/24/2023 1213    CREATININE 1.11 (H) 09/13/2022 0920    CALCIUM 9.4 06/12/2024 1351    CALCIUM 9.2 03/12/2024 0908    CALCIUM 9.5 12/15/2023 0707    PHOS 1.9 (L) 06/12/2024 1351    PHOS 2.5 03/12/2024 0908           Component Value Date/Time    WBC 8.92 06/12/2024 1351    WBC 9.99 03/12/2024 0908    HGB 14.3 06/12/2024 1351    HGB 14.7 03/12/2024 0908    HCT 45.6 06/12/2024 1351    HCT 47.8 (H) 03/12/2024 0908     06/12/2024 1351     03/12/2024 0908         Impression:    1. Stage 3a chronic kidney disease        2. Chronic kidney disease, stage 3a  Ambulatory referral/consult to Nephrology      3. Primary hypertension        4. Class 3 severe obesity due to excess calories with serious comorbidity and body mass index (BMI) of 45.0 to 49.9 in adult        5. Hypophosphatemia        6. Metabolic acidosis          CKD 3A most likely related to nephrosclerosis and hyper filtration syndrome; renal function improved with creatinine back down to 1.2.  Looking back at records creatinine ranges from 1.1 to 1.2 since 2017.  Blood pressure well controlled at home on lisinopril and amlodipine  Recent vomiting and diarrhea, recent diagnosis of diverticulosis and placed on antibiotics:  Likely cause of acidosis and hypophosphatemia; we will repeat labs  No anemia  No significant proteinuria  Maintained on ACE inhibitor      Plan:  Repeat labs and ultrasound in 2  weeks (monitoring phosphorus and CO2)  We will call patient for any abnormal results    Main line of management is control of blood pressure, weight loss, avoiding nephrotoxic agents  Otherwise follow up in 6 months    Avoid NSAIDs (Aleve (naproxen), Mobic, Celebrex, Advil (ibuprofen), Toradol and Diclofenac).  Only take tylenol (acetaminophen) occasionally if needed for aches/pains.    Recommend low sodium diet:  Avoid high salt foods (olives, pickles, smoked meats, deli meats, salted potato chips, fast food, etc.).   Do not add salt to your food at the table.   Use only small amounts of salt when cooking.    Recommended Daily Protein Intake for chronic kidney disease:  0.8 g/kg = less than 100 g daily    Recommend a healthy lifestyle for weight loss:  Light to moderate exercise as tolerated  Low carbohydrate, low fat diet  Portion control    Avoid alcohol and soda. Limit tea and coffee.     Stay well hydrated with water    Patient to call our office with any concerns prior to next appointment.    Chance PEDRAZA      This note was created with the assistance of [employer] voice recognition software or phone dictation. There may be transcription errors as a result of using this technology however minimal. Effort has been made to assure accuracy of transcription but any obvious errors or omissions should be clarified with the author of the document.

## 2024-06-27 ENCOUNTER — HOSPITAL ENCOUNTER (OUTPATIENT)
Dept: RADIOLOGY | Facility: HOSPITAL | Age: 64
Discharge: HOME | End: 2024-06-27
Payer: MEDICARE

## 2024-06-27 DIAGNOSIS — N18.31 CHRONIC KIDNEY DISEASE, STAGE 3A: ICD-10-CM

## 2024-06-27 DIAGNOSIS — E87.20 METABOLIC ACIDOSIS: ICD-10-CM

## 2024-06-27 DIAGNOSIS — E83.39 HYPOPHOSPHATEMIA: ICD-10-CM

## 2024-06-27 PROCEDURE — 76770 US EXAM ABDO BACK WALL COMP: CPT | Mod: TC

## 2024-07-02 ENCOUNTER — LAB VISIT (OUTPATIENT)
Dept: LAB | Facility: HOSPITAL | Age: 64
End: 2024-07-02
Payer: MEDICARE

## 2024-07-02 ENCOUNTER — OFFICE VISIT (OUTPATIENT)
Dept: FAMILY MEDICINE | Facility: CLINIC | Age: 64
End: 2024-07-02
Payer: MEDICARE

## 2024-07-02 VITALS
WEIGHT: 271.5 LBS | TEMPERATURE: 97 F | HEIGHT: 65 IN | HEART RATE: 97 BPM | SYSTOLIC BLOOD PRESSURE: 140 MMHG | OXYGEN SATURATION: 96 % | RESPIRATION RATE: 19 BRPM | DIASTOLIC BLOOD PRESSURE: 82 MMHG | BODY MASS INDEX: 45.23 KG/M2

## 2024-07-02 DIAGNOSIS — E83.39 HYPOPHOSPHATEMIA: ICD-10-CM

## 2024-07-02 DIAGNOSIS — N18.31 CHRONIC KIDNEY DISEASE, STAGE 3A: ICD-10-CM

## 2024-07-02 DIAGNOSIS — E87.20 METABOLIC ACIDOSIS: ICD-10-CM

## 2024-07-02 DIAGNOSIS — I10 PRIMARY HYPERTENSION: Primary | ICD-10-CM

## 2024-07-02 LAB
ALBUMIN SERPL-MCNC: 3.8 G/DL (ref 3.4–4.8)
ALBUMIN/GLOB SERPL: 0.8 RATIO (ref 1.1–2)
ALP SERPL-CCNC: 71 UNIT/L (ref 40–150)
ALT SERPL-CCNC: 30 UNIT/L (ref 0–55)
ANION GAP SERPL CALC-SCNC: 12 MEQ/L
AST SERPL-CCNC: 26 UNIT/L (ref 5–34)
BILIRUB SERPL-MCNC: 0.3 MG/DL
BUN SERPL-MCNC: 11.1 MG/DL (ref 9.8–20.1)
CALCIUM SERPL-MCNC: 9.5 MG/DL (ref 8.4–10.2)
CHLORIDE SERPL-SCNC: 109 MMOL/L (ref 98–107)
CO2 SERPL-SCNC: 20 MMOL/L (ref 23–31)
CREAT SERPL-MCNC: 1.23 MG/DL (ref 0.55–1.02)
CREAT/UREA NIT SERPL: 9
GFR SERPLBLD CREATININE-BSD FMLA CKD-EPI: 49 ML/MIN/1.73/M2
GLOBULIN SER-MCNC: 4.6 GM/DL (ref 2.4–3.5)
GLUCOSE SERPL-MCNC: 139 MG/DL (ref 82–115)
MAGNESIUM SERPL-MCNC: 1.9 MG/DL (ref 1.6–2.6)
PHOSPHATE SERPL-MCNC: 2.4 MG/DL (ref 2.3–4.7)
POTASSIUM SERPL-SCNC: 3.6 MMOL/L (ref 3.5–5.1)
PROT SERPL-MCNC: 8.4 GM/DL (ref 5.8–7.6)
SODIUM SERPL-SCNC: 141 MMOL/L (ref 136–145)

## 2024-07-02 PROCEDURE — 84100 ASSAY OF PHOSPHORUS: CPT

## 2024-07-02 PROCEDURE — 1159F MED LIST DOCD IN RCRD: CPT | Mod: CPTII,,, | Performed by: NURSE PRACTITIONER

## 2024-07-02 PROCEDURE — 3077F SYST BP >= 140 MM HG: CPT | Mod: CPTII,,, | Performed by: NURSE PRACTITIONER

## 2024-07-02 PROCEDURE — 1160F RVW MEDS BY RX/DR IN RCRD: CPT | Mod: CPTII,,, | Performed by: NURSE PRACTITIONER

## 2024-07-02 PROCEDURE — 3079F DIAST BP 80-89 MM HG: CPT | Mod: CPTII,,, | Performed by: NURSE PRACTITIONER

## 2024-07-02 PROCEDURE — 3008F BODY MASS INDEX DOCD: CPT | Mod: CPTII,,, | Performed by: NURSE PRACTITIONER

## 2024-07-02 PROCEDURE — 3066F NEPHROPATHY DOC TX: CPT | Mod: CPTII,,, | Performed by: NURSE PRACTITIONER

## 2024-07-02 PROCEDURE — 80053 COMPREHEN METABOLIC PANEL: CPT

## 2024-07-02 PROCEDURE — 4010F ACE/ARB THERAPY RXD/TAKEN: CPT | Mod: CPTII,,, | Performed by: NURSE PRACTITIONER

## 2024-07-02 PROCEDURE — 36415 COLL VENOUS BLD VENIPUNCTURE: CPT

## 2024-07-02 PROCEDURE — 83735 ASSAY OF MAGNESIUM: CPT

## 2024-07-02 PROCEDURE — 99214 OFFICE O/P EST MOD 30 MIN: CPT | Mod: ,,, | Performed by: NURSE PRACTITIONER

## 2024-07-02 NOTE — PROGRESS NOTES
Subjective:      Patient ID: Christie Marcum is a 63 y.o. Black or  female      Chief Complaint: Follow-up (Hypertension)       Past Medical History:   Diagnosis Date    Anxiety     Arthritis     Asthma     Cancer of splenic flexure     CKD (chronic kidney disease)     COPD (chronic obstructive pulmonary disease) 07/05/2022    Decreased hearing of left ear 12/07/2022    Depressive disorder 07/05/2022    Epilepsy 07/05/2022    Has had seizures since the age of 13. Initial seizure was in the setting of acute illness with hydronephrosis and continued to have seizures frequently. Used to stare and throw dolls at her sister and did not know they were seizures.  SHe used to have 30-40 seizures a month. Currently seizure frequency is about 1 every 6 months. She is on vimpat 200 BID, topamax 200 BID. She also takes clorazepate    Gastroesophageal reflux disease 07/05/2022    Glaucoma 07/05/2022    Hypertension     IGT (impaired glucose tolerance)     Low back pain 09/21/2022    Moderate persistent asthma without complication 08/25/2022    Neuropathy 07/05/2022    On home oxygen therapy 09/21/2022    KHANH on CPAP     Overactive bladder 07/05/2022    Seasonal allergies 11/02/2022        HPI  Presents today for re-evaluation of BP.      HTN:  BP elevated at previous visit.  Medication changes were made.  Currently taking Lisinopril 40 mg po daily and Norvasc 5 mg po daily.  BP slightly elevated today.  States compliance with medications.  Denies any headaches, dizziness, syncope, CP, or SOB.  Denies any other problems.            Patient Care Team:  Mary Leon MD as PCP - General (Family Medicine)  Yamile Mortensen MD (Pulmonary Disease)  Cornelio Waggoner MD as Consulting Physician (Gastroenterology)  Indira Worley MD as Consulting Physician (Neurology)  Tahir Ahmadi MD as Consulting Physician (Oncology)  Mitch Flor MD as Consulting Physician (Urology)      Review of Systems    Constitutional: Negative.  Negative for appetite change, chills and fever.   HENT: Negative.     Eyes: Negative.  Negative for discharge and redness.   Respiratory: Negative.  Negative for shortness of breath.    Cardiovascular: Negative.  Negative for chest pain.   Gastrointestinal: Negative.    Endocrine: Negative.    Genitourinary: Negative.    Integumentary:  Negative.   Allergic/Immunologic: Negative.    Neurological: Negative.    Psychiatric/Behavioral: Negative.     All other systems reviewed and are negative.          Objective:      Vitals:    07/02/24 1416   BP: (!) 140/82   Pulse:    Resp:    Temp:       Body mass index is 45.18 kg/m².     Physical Exam  Vitals reviewed.   Constitutional:       Appearance: Normal appearance.   HENT:      Head: Normocephalic.      Mouth/Throat:      Mouth: Mucous membranes are moist.   Eyes:      Conjunctiva/sclera: Conjunctivae normal.      Pupils: Pupils are equal, round, and reactive to light.   Cardiovascular:      Rate and Rhythm: Normal rate and regular rhythm.   Pulmonary:      Effort: Pulmonary effort is normal. No respiratory distress.      Breath sounds: Normal breath sounds.   Musculoskeletal:         General: Normal range of motion.      Cervical back: Normal range of motion and neck supple.   Skin:     General: Skin is warm and dry.   Neurological:      Mental Status: She is alert and oriented to person, place, and time.   Psychiatric:         Mood and Affect: Mood normal.            Current Outpatient Medications:     albuterol-ipratropium (DUO-NEB) 2.5 mg-0.5 mg/3 mL nebulizer solution, PLEASE SEE ATTACHED FOR DETAILED DIRECTIONS, Disp: , Rfl:     amLODIPine (NORVASC) 5 MG tablet, Take 1 tablet (5 mg total) by mouth once daily., Disp: 90 tablet, Rfl: 3    cholestyramine (QUESTRAN) 4 gram packet, Take by mouth as needed., Disp: , Rfl:     clonazePAM (KLONOPIN) 1 MG tablet, TAKE 0.5 TABLETS (0.5 MG TOTAL) BY MOUTH EVERY EVENING., Disp: 15 tablet, Rfl: 2     gabapentin (NEURONTIN) 300 MG capsule, Take 1 capsule (300 mg total) by mouth every evening., Disp: 30 capsule, Rfl: 11    hydrOXYzine pamoate (VISTARIL) 25 MG Cap, TAKE 2 CAPSULES BY MOUTH TWICE A DAY, Disp: 360 capsule, Rfl: 3    lacosamide (VIMPAT) 200 mg Tab tablet, Take 1 tablet (200 mg total) by mouth every 12 (twelve) hours., Disp: 60 tablet, Rfl: 5    latanoprost 0.005 % ophthalmic solution, Place 1 drop into both eyes nightly., Disp: , Rfl:     levocetirizine (XYZAL) 5 MG tablet, Take 5 mg by mouth every evening., Disp: , Rfl:     lisinopriL (PRINIVIL,ZESTRIL) 40 MG tablet, Take 1 tablet (40 mg total) by mouth once daily., Disp: 90 tablet, Rfl: 3    nebulizer and compressor Eli, 1 each by Misc.(Non-Drug; Combo Route) route every 6 (six) hours as needed (wheezing)., Disp: 1 each, Rfl: 0    ondansetron (ZOFRAN-ODT) 4 MG TbDL, Take 4 mg by mouth every 8 (eight) hours as needed., Disp: , Rfl:     OPTICHAMBER ADULT MASK-LARGE Eli, USE AS DIRECTED WITH SYMBICORT, Disp: , Rfl:     topiramate (TOPAMAX) 100 MG tablet, Take 1.5 tablets (150 mg total) by mouth 2 (two) times daily., Disp: 270 tablet, Rfl: 3    venlafaxine (EFFEXOR-XR) 37.5 MG 24 hr capsule, TAKE 1 CAPSULE (37.5 MG TOTAL) BY MOUTH ONCE DAILY. TO TAKE WITH 75MG ONCE DAILY, Disp: 90 capsule, Rfl: 3    venlafaxine (EFFEXOR-XR) 75 MG 24 hr capsule, TAKE 1 CAPSULE BY MOUTH EVERY DAY, Disp: 90 capsule, Rfl: 3    amoxicillin-clavulanate 875-125mg (AUGMENTIN) 875-125 mg per tablet, Take 1 tablet by mouth 2 (two) times daily., Disp: , Rfl:     budesonide (PULMICORT) 0.5 mg/2 mL nebulizer solution, Take 0.5 mg by nebulization daily as needed., Disp: , Rfl:     Assessment & Plan:     Problem List Items Addressed This Visit          Cardiac/Vascular    Hypertension - Primary     BP slightly elevated; Asymptomatic  Does not wish to increase Norvasc   Continue current medication (Lisinopril 40 mg po daily and Norvasc 5 mg po daily) as prescribed  Daily BP check;  bring log all clinic visits  Instructed to report to ED for any BP greater than 200/100  RTC in 1 month for reevaluation   Patient is agreeable to plan and verbalizes understanding

## 2024-07-02 NOTE — ASSESSMENT & PLAN NOTE
BP slightly elevated; Asymptomatic  Does not wish to increase Norvasc   Continue current medication (Lisinopril 40 mg po daily and Norvasc 5 mg po daily) as prescribed  Daily BP check; bring log all clinic visits  Instructed to report to ED for any BP greater than 200/100  RTC in 1 month for reevaluation   Patient is agreeable to plan and verbalizes understanding

## 2024-07-03 ENCOUNTER — TELEPHONE (OUTPATIENT)
Dept: NEPHROLOGY | Facility: CLINIC | Age: 64
End: 2024-07-03
Payer: MEDICARE

## 2024-07-03 DIAGNOSIS — E87.20 METABOLIC ACIDOSIS: Primary | ICD-10-CM

## 2024-07-03 RX ORDER — SODIUM BICARBONATE 650 MG/1
650 TABLET ORAL 2 TIMES DAILY
Qty: 60 TABLET | Refills: 11 | Status: SHIPPED | OUTPATIENT
Start: 2024-07-03 | End: 2025-07-03

## 2024-07-03 NOTE — TELEPHONE ENCOUNTER
----- Message from KEILA Evans sent at 7/3/2024 10:16 AM CDT -----  Rx: sodium bicarbonate 650mg BID    Phosphorus is back to normal but blood acidity still low. This medication will help balance acidity level.     (Personal note, likely RTA)    Called patient regarding lab results, instructed on new order for Sodium Bicarb 650 mg bid, sent to pharmacy on file. Verbalized understanding.

## 2024-07-23 ENCOUNTER — TELEPHONE (OUTPATIENT)
Dept: FAMILY MEDICINE | Facility: CLINIC | Age: 64
End: 2024-07-23
Payer: MEDICARE

## 2024-07-23 NOTE — TELEPHONE ENCOUNTER
----- Message from Nimisha [facility] sent at 7/23/2024  1:12 PM CDT -----  Who Called: Christie Marcum    Caller is requesting assistance/information from provider's office.    Symptoms (please be specific):      How long has patient had these symptoms:      List of preferred pharmacies on file (remove unneeded): [unfilled]  If different, enter pharmacy into here including location and phone number:         Preferred Method of Contact: Phone Call  Patient's Preferred Phone Number on File: [phone]   Best Call Back Number, if different:  Additional Information: requested Kenisha to call back to speak about a referral

## 2024-07-23 NOTE — TELEPHONE ENCOUNTER
Patient currently seeing Dr. Worley for seizures. She stated she is not satisfied with her care there and is requesting a referral to Dr. Gannon. I did notify patient that Dr. Gannon is a neurosurgeon so I could not guarantee that he would see her for seizures but that I would place the referral request for her. She verbalized understanding

## 2024-07-26 RX ORDER — VENLAFAXINE HYDROCHLORIDE 37.5 MG/1
37.5 CAPSULE, EXTENDED RELEASE ORAL DAILY
Qty: 90 CAPSULE | Refills: 3 | Status: SHIPPED | OUTPATIENT
Start: 2024-07-26 | End: 2025-07-26

## 2024-07-26 NOTE — TELEPHONE ENCOUNTER
----- Message from Mani Clinton sent at 7/26/2024 10:00 AM CDT -----  Type:  RX Refill Request    Who Called:Christie   Refill or New Rx: Refill   RX Name and Strength: venlafaxine (EFFEXOR-XR) 37.5 MG 24 hr capsule  How is the patient currently taking it? (ex. 1XDay): 1 x day   Is this a 30 day or 90 day RX: 90 day   Preferred Pharmacy with phone number: [facility] [place] [address] [address]  Local or Mail Order: Local  Ordering Provider: Carolyn   Would the patient rather a call back or a response via [facility]?   Best Call Back Number: [phone]  Additional Information: Please call her back with refill is completed thanks.

## 2024-08-05 ENCOUNTER — OFFICE VISIT (OUTPATIENT)
Dept: FAMILY MEDICINE | Facility: CLINIC | Age: 64
End: 2024-08-05
Payer: MEDICARE

## 2024-08-05 VITALS
DIASTOLIC BLOOD PRESSURE: 80 MMHG | WEIGHT: 277.81 LBS | HEART RATE: 103 BPM | BODY MASS INDEX: 46.28 KG/M2 | SYSTOLIC BLOOD PRESSURE: 138 MMHG | OXYGEN SATURATION: 97 % | HEIGHT: 65 IN | RESPIRATION RATE: 20 BRPM | TEMPERATURE: 98 F

## 2024-08-05 DIAGNOSIS — I10 PRIMARY HYPERTENSION: Primary | ICD-10-CM

## 2024-08-05 DIAGNOSIS — M47.816 LUMBAR SPONDYLOSIS: ICD-10-CM

## 2024-08-05 PROCEDURE — 1159F MED LIST DOCD IN RCRD: CPT | Mod: CPTII,,, | Performed by: NURSE PRACTITIONER

## 2024-08-05 PROCEDURE — 3075F SYST BP GE 130 - 139MM HG: CPT | Mod: CPTII,,, | Performed by: NURSE PRACTITIONER

## 2024-08-05 PROCEDURE — 99214 OFFICE O/P EST MOD 30 MIN: CPT | Mod: ,,, | Performed by: NURSE PRACTITIONER

## 2024-08-05 PROCEDURE — 3066F NEPHROPATHY DOC TX: CPT | Mod: CPTII,,, | Performed by: NURSE PRACTITIONER

## 2024-08-05 PROCEDURE — 1160F RVW MEDS BY RX/DR IN RCRD: CPT | Mod: CPTII,,, | Performed by: NURSE PRACTITIONER

## 2024-08-05 PROCEDURE — 3008F BODY MASS INDEX DOCD: CPT | Mod: CPTII,,, | Performed by: NURSE PRACTITIONER

## 2024-08-05 PROCEDURE — 4010F ACE/ARB THERAPY RXD/TAKEN: CPT | Mod: CPTII,,, | Performed by: NURSE PRACTITIONER

## 2024-08-05 PROCEDURE — 3079F DIAST BP 80-89 MM HG: CPT | Mod: CPTII,,, | Performed by: NURSE PRACTITIONER

## 2024-08-23 DIAGNOSIS — G40.209 PARTIAL SYMPTOMATIC EPILEPSY WITH COMPLEX PARTIAL SEIZURES, NOT INTRACTABLE, WITHOUT STATUS EPILEPTICUS: ICD-10-CM

## 2024-08-23 RX ORDER — VENLAFAXINE HYDROCHLORIDE 37.5 MG/1
37.5 CAPSULE, EXTENDED RELEASE ORAL DAILY
Qty: 90 CAPSULE | Refills: 3 | Status: SHIPPED | OUTPATIENT
Start: 2024-08-23 | End: 2025-08-23

## 2024-08-23 RX ORDER — CLONAZEPAM 1 MG/1
0.5 TABLET ORAL NIGHTLY PRN
Qty: 15 TABLET | Refills: 2 | Status: SHIPPED | OUTPATIENT
Start: 2024-08-23

## 2024-08-23 RX ORDER — VENLAFAXINE HYDROCHLORIDE 75 MG/1
75 CAPSULE, EXTENDED RELEASE ORAL DAILY
Qty: 90 CAPSULE | Refills: 3 | Status: SHIPPED | OUTPATIENT
Start: 2024-08-23

## 2024-08-23 NOTE — TELEPHONE ENCOUNTER
Patient said to disregard clonazepam refill request     Detail Level: Detailed Render Risk Assessment In Note?: no Additional Notes: Patient consent was obtained to proceed with the visit and recommended plan of care after discussion of all risks and benefits, including the risks of COVID-19 exposure. Patient states that she has received both Covid Vaccinations.

## 2024-08-23 NOTE — TELEPHONE ENCOUNTER
----- Message from Grazyna Chung sent at 8/23/2024  9:26 AM CDT -----  Regarding: med refill  .Who Called: Christie Marcum    Refill or New Rx:Refill  RX Name and Strength:venlafaxine (EFFEXOR-XR) 37.5 MG 24 hr capsule   How is the patient currently taking it? (ex. 1XDay):1xday  Is this a 30 day or 90 day RX:90  Local or Mail Order:Local  List of preferred pharmacies on file (remove unneeded): [unfilled]  If different Pharmacy is requested, enter Pharmacy information here including location and phone number:[id number] - [place], [place] - [address]      Ordering Provider:Carolyn      Preferred Method of Contact: Phone Call  Patient's Preferred Phone Number on File: [phone]   Best Call Back Number, if different:  Additional Information: Disregard prev med request for clonazepam, it's for another provider.

## 2024-08-23 NOTE — TELEPHONE ENCOUNTER
----- Message from Willard Nuno sent at 8/23/2024  9:20 AM CDT -----  .Type:  RX Refill Request    Who Called: pt   Refill or New Rx:refill   RX Name and Strength:clonazePAM (KLONOPIN) 1 MG tablet  How is the patient currently taking it? (ex. 1XDay):1x  Is this a 30 day or 90 day RX:30  [id number] [place] [facility]  Local or Mail Order:local   Ordering Provider:erica   Would the patient rather a call back or a response via [facility]? Call back   Best Call Back Number:[phone]  Additional Information:     .Type:  RX Refill Request    Who Called: pt   Refill or New Rx:refill   RX Name and Strength:venlafaxine (EFFEXOR-XR) 75 MG 24 hr capsule  How is the patient currently taking it? (ex. 1XDay):1x  Is this a 30 day or 90 day RX:90  Preferred Pharmacy with phone number:[id number] [employer] [place] [facility]  Local or Mail Order:local   Ordering Provider:erica   Would the patient rather a call back or a response via [facility] [employer] [facility]? Call back   Best Call Back Number:[phone]  Additional Information:

## 2024-08-27 ENCOUNTER — TELEPHONE (OUTPATIENT)
Dept: NEUROLOGY | Facility: CLINIC | Age: 64
End: 2024-08-27

## 2024-08-27 NOTE — TELEPHONE ENCOUNTER
Pt called requesting refill of cream that was prescribed for a previous rash Pt informed the last recommendation that was given regarding a rash due to medication was oral OTC benadryl to be taken as needed Pt informed there is a OTC benadryl cream that can be used.

## 2024-09-18 DIAGNOSIS — I10 PRIMARY HYPERTENSION: ICD-10-CM

## 2024-09-18 RX ORDER — HYDROXYZINE PAMOATE 25 MG/1
50 CAPSULE ORAL 2 TIMES DAILY
Qty: 360 CAPSULE | Refills: 0 | Status: SHIPPED | OUTPATIENT
Start: 2024-09-18

## 2024-09-18 RX ORDER — VENLAFAXINE HYDROCHLORIDE 37.5 MG/1
37.5 CAPSULE, EXTENDED RELEASE ORAL DAILY
Qty: 90 CAPSULE | Refills: 0 | Status: SHIPPED | OUTPATIENT
Start: 2024-09-18 | End: 2025-09-18

## 2024-09-18 RX ORDER — VENLAFAXINE HYDROCHLORIDE 75 MG/1
75 CAPSULE, EXTENDED RELEASE ORAL DAILY
Qty: 90 CAPSULE | Refills: 0 | Status: SHIPPED | OUTPATIENT
Start: 2024-09-18

## 2024-09-18 RX ORDER — AMLODIPINE BESYLATE 5 MG/1
5 TABLET ORAL DAILY
Qty: 90 TABLET | Refills: 0 | Status: SHIPPED | OUTPATIENT
Start: 2024-09-18 | End: 2025-09-18

## 2024-09-18 NOTE — TELEPHONE ENCOUNTER
----- Message from Jammie Agarwal sent at 9/18/2024  3:45 PM CDT -----  Regarding: refill  Who Called: No patient name on file.    Refill or New Rx:Refill  RX Name and Strength:venlafaxine (EFFEXOR-XR) 37.5 MG 24 hr capsule  How is the patient currently taking it? (ex. 1XDay):1x day  Is this a 30 day or 90 day RX:90  Local or Mail Order:mail  List of preferred pharmacies on file (remove unneeded): [unfilled]  If different Pharmacy is requested, enter Pharmacy information here including location and phone number: [address]   Phone: [phone]  Fax: [phone]         Ordering Provider:      Preferred Method of Contact: Phone Call  Patient's Preferred Phone Number on File: There are no phone numbers on file.   Best Call Back Number, if different:  Additional Information:       Who Called: Christie Marcum    Refill or New Rx:Refill  RX Name and Strength:venlafaxine (EFFEXOR-XR) 75 MG 24 hr capsule  How is the patient currently taking it? (ex. 1XDay):1x day  Is this a 30 day or 90 day RX:90  Local or Mail Order:mail  List of preferred pharmacies on file (remove unneeded): [unfilled]  If different Pharmacy is requested, enter Pharmacy information here including location and phone number: [address]   Phone: [phone]  Fax: [phone]         Ordering Provider:      Preferred Method of Contact: Phone Call  Patient's Preferred Phone Number on File: [phone]   Best Call Back Number, if different:  Additional Information:     Who Called: Christie Marcum    Refill or New Rx:Refill  RX Name and Strength:hydrOXYzine pamoate (VISTARIL) 25 MG Cap  How is the patient currently taking it? (ex. 1XDay):2x day  Is this a 30 day or 90 day RX:90  Local or Mail Order:mail  List of preferred pharmacies on file (remove unneeded): [unfilled]  If different Pharmacy is requested, enter Pharmacy  information here including location and phone number: [place], [place] - [address]    Phone: [phone]  Fax: [phone]         Ordering Provider:      Preferred Method of Contact: Phone Call  Patient's Preferred Phone Number on File: [phone]   Best Call Back Number, if different:  Additional Information:     Who Called: Christie Marcum    Refill or New Rx:Refill  RX Name and Strength:amLODIPine (NORVASC) 5 MG tablet  How is the patient currently taking it? (ex. 1XDay):1x day  Is this a 30 day or 90 day RX:90  Local or Mail Order:mail  List of preferred pharmacies on file (remove unneeded): [unfilled]  If different Pharmacy is requested, enter Pharmacy information here including location and phone number:  [facility] – [place], [place] - [address]   Phone: [phone]  Fax: [phone]        Ordering Provider:      Preferred Method of Contact: Phone Call  Patient's Preferred Phone Number on File: [phone]   Best Call Back Number, if different:  Additional Information:

## 2024-09-19 DIAGNOSIS — G40.209 PARTIAL SYMPTOMATIC EPILEPSY WITH COMPLEX PARTIAL SEIZURES, NOT INTRACTABLE, WITHOUT STATUS EPILEPTICUS: ICD-10-CM

## 2024-09-19 DIAGNOSIS — R56.9 SEIZURE: ICD-10-CM

## 2024-09-19 DIAGNOSIS — M54.9 DORSALGIA, UNSPECIFIED: ICD-10-CM

## 2024-09-19 RX ORDER — GABAPENTIN 300 MG/1
300 CAPSULE ORAL NIGHTLY
Qty: 30 CAPSULE | Refills: 11 | Status: SHIPPED | OUTPATIENT
Start: 2024-09-19 | End: 2025-09-19

## 2024-09-19 RX ORDER — LACOSAMIDE 200 MG/1
200 TABLET ORAL EVERY 12 HOURS
Qty: 60 TABLET | Refills: 5 | Status: SHIPPED | OUTPATIENT
Start: 2024-09-19

## 2024-09-19 RX ORDER — CLONAZEPAM 1 MG/1
0.5 TABLET ORAL NIGHTLY PRN
Qty: 15 TABLET | Refills: 2 | Status: SHIPPED | OUTPATIENT
Start: 2024-09-19

## 2024-09-19 RX ORDER — TOPIRAMATE 100 MG/1
150 TABLET, FILM COATED ORAL 2 TIMES DAILY
Qty: 270 TABLET | Refills: 3 | Status: SHIPPED | OUTPATIENT
Start: 2024-09-19

## 2024-09-23 ENCOUNTER — TELEPHONE (OUTPATIENT)
Dept: FAMILY MEDICINE | Facility: CLINIC | Age: 64
End: 2024-09-23
Payer: MEDICARE

## 2024-09-23 NOTE — TELEPHONE ENCOUNTER
----- Message from Caty Cruz sent at 9/20/2024  1:01 PM CDT -----  .Who Called: Christie Marcum        Preferred Method of Contact: Phone Call  Patient's Preferred Phone Number on File: [phone]   Best Call Back Number, if different:  Additional Information: bp meds was changed and bp been high and has request for PT  and requesting albuterol when is riding per [employer] nurse

## 2024-11-12 ENCOUNTER — TELEPHONE (OUTPATIENT)
Dept: NEUROLOGY | Facility: CLINIC | Age: 64
End: 2024-11-12
Payer: MEDICARE

## 2024-11-12 ENCOUNTER — OFFICE VISIT (OUTPATIENT)
Dept: NEUROLOGY | Facility: CLINIC | Age: 64
End: 2024-11-12
Payer: MEDICARE

## 2024-11-12 VITALS
SYSTOLIC BLOOD PRESSURE: 172 MMHG | HEIGHT: 65 IN | BODY MASS INDEX: 45.09 KG/M2 | WEIGHT: 270.63 LBS | HEART RATE: 108 BPM | DIASTOLIC BLOOD PRESSURE: 82 MMHG

## 2024-11-12 DIAGNOSIS — G40.409 OTHER GENERALIZED EPILEPSY, NOT INTRACTABLE, WITHOUT STATUS EPILEPTICUS: Primary | ICD-10-CM

## 2024-11-12 DIAGNOSIS — M47.816 LUMBAR SPONDYLOSIS: ICD-10-CM

## 2024-11-12 PROCEDURE — 4010F ACE/ARB THERAPY RXD/TAKEN: CPT | Mod: CPTII,S$GLB,, | Performed by: NURSE PRACTITIONER

## 2024-11-12 PROCEDURE — 1159F MED LIST DOCD IN RCRD: CPT | Mod: CPTII,S$GLB,, | Performed by: NURSE PRACTITIONER

## 2024-11-12 PROCEDURE — 1160F RVW MEDS BY RX/DR IN RCRD: CPT | Mod: CPTII,S$GLB,, | Performed by: NURSE PRACTITIONER

## 2024-11-12 PROCEDURE — 99999 PR PBB SHADOW E&M-EST. PATIENT-LVL III: CPT | Mod: PBBFAC,,, | Performed by: NURSE PRACTITIONER

## 2024-11-12 PROCEDURE — 3077F SYST BP >= 140 MM HG: CPT | Mod: CPTII,S$GLB,, | Performed by: NURSE PRACTITIONER

## 2024-11-12 PROCEDURE — 3066F NEPHROPATHY DOC TX: CPT | Mod: CPTII,S$GLB,, | Performed by: NURSE PRACTITIONER

## 2024-11-12 PROCEDURE — 3008F BODY MASS INDEX DOCD: CPT | Mod: CPTII,S$GLB,, | Performed by: NURSE PRACTITIONER

## 2024-11-12 PROCEDURE — 99214 OFFICE O/P EST MOD 30 MIN: CPT | Mod: S$GLB,,, | Performed by: NURSE PRACTITIONER

## 2024-11-12 PROCEDURE — 3079F DIAST BP 80-89 MM HG: CPT | Mod: CPTII,S$GLB,, | Performed by: NURSE PRACTITIONER

## 2024-11-12 NOTE — ASSESSMENT & PLAN NOTE
-needs EMU monitoring to characterize events  -continue topamax 150 mg BID and vimpat 200 mg BID  -she has declined psychiatry referral

## 2024-11-12 NOTE — ASSESSMENT & PLAN NOTE
-still not interested in pain management referral.  Weight loss could help with pain so encouraged her to discuss medication options with her PCP

## 2024-11-18 RX ORDER — VENLAFAXINE HYDROCHLORIDE 37.5 MG/1
37.5 CAPSULE, EXTENDED RELEASE ORAL DAILY
Qty: 90 CAPSULE | Refills: 3 | Status: SHIPPED | OUTPATIENT
Start: 2024-11-18 | End: 2024-11-21 | Stop reason: SDUPTHER

## 2024-11-21 ENCOUNTER — TELEPHONE (OUTPATIENT)
Dept: NEUROLOGY | Facility: CLINIC | Age: 64
End: 2024-11-21
Payer: MEDICARE

## 2024-11-21 DIAGNOSIS — R56.9 SEIZURE: Primary | ICD-10-CM

## 2024-11-21 DIAGNOSIS — G40.209 PARTIAL SYMPTOMATIC EPILEPSY WITH COMPLEX PARTIAL SEIZURES, NOT INTRACTABLE, WITHOUT STATUS EPILEPTICUS: ICD-10-CM

## 2024-11-21 DIAGNOSIS — I10 PRIMARY HYPERTENSION: ICD-10-CM

## 2024-11-21 DIAGNOSIS — R56.9 SEIZURE: ICD-10-CM

## 2024-11-21 RX ORDER — LISINOPRIL 40 MG/1
40 TABLET ORAL DAILY
Qty: 90 TABLET | Refills: 3 | Status: SHIPPED | OUTPATIENT
Start: 2024-11-21 | End: 2025-11-21

## 2024-11-21 RX ORDER — TOPIRAMATE 100 MG/1
150 TABLET, FILM COATED ORAL 2 TIMES DAILY
Qty: 270 TABLET | Refills: 3 | Status: SHIPPED | OUTPATIENT
Start: 2024-11-21

## 2024-11-21 RX ORDER — LISINOPRIL 40 MG/1
40 TABLET ORAL DAILY
Qty: 90 TABLET | Refills: 3 | Status: SHIPPED | OUTPATIENT
Start: 2024-11-21 | End: 2024-11-21 | Stop reason: SDUPTHER

## 2024-11-21 RX ORDER — CLONAZEPAM 1 MG/1
0.5 TABLET ORAL NIGHTLY PRN
Qty: 15 TABLET | Refills: 2 | Status: SHIPPED | OUTPATIENT
Start: 2024-11-21

## 2024-11-21 RX ORDER — HYDROXYZINE PAMOATE 25 MG/1
50 CAPSULE ORAL 2 TIMES DAILY
Qty: 360 CAPSULE | Refills: 0 | Status: SHIPPED | OUTPATIENT
Start: 2024-11-21

## 2024-11-21 RX ORDER — LACOSAMIDE 200 MG/1
200 TABLET ORAL EVERY 12 HOURS
Qty: 60 TABLET | Refills: 5 | Status: SHIPPED | OUTPATIENT
Start: 2024-11-21

## 2024-11-21 RX ORDER — VENLAFAXINE HYDROCHLORIDE 37.5 MG/1
37.5 CAPSULE, EXTENDED RELEASE ORAL DAILY
Qty: 90 CAPSULE | Refills: 3 | Status: SHIPPED | OUTPATIENT
Start: 2024-11-21 | End: 2025-11-21

## 2024-11-21 RX ORDER — AMLODIPINE BESYLATE 5 MG/1
5 TABLET ORAL DAILY
Qty: 90 TABLET | Refills: 0 | Status: SHIPPED | OUTPATIENT
Start: 2024-11-21 | End: 2025-11-21

## 2024-11-21 RX ORDER — HYDROXYZINE PAMOATE 25 MG/1
50 CAPSULE ORAL 2 TIMES DAILY
Qty: 360 CAPSULE | Refills: 0 | Status: SHIPPED | OUTPATIENT
Start: 2024-11-21 | End: 2024-11-21 | Stop reason: SDUPTHER

## 2024-11-21 RX ORDER — VENLAFAXINE HYDROCHLORIDE 37.5 MG/1
37.5 CAPSULE, EXTENDED RELEASE ORAL DAILY
Qty: 90 CAPSULE | Refills: 3 | Status: SHIPPED | OUTPATIENT
Start: 2024-11-21 | End: 2024-11-21 | Stop reason: SDUPTHER

## 2024-11-21 RX ORDER — AMLODIPINE BESYLATE 5 MG/1
5 TABLET ORAL DAILY
Qty: 90 TABLET | Refills: 0 | Status: SHIPPED | OUTPATIENT
Start: 2024-11-21 | End: 2024-11-21 | Stop reason: SDUPTHER

## 2024-11-21 NOTE — TELEPHONE ENCOUNTER
----- Message from Byron sent at 11/21/2024  1:03 PM CST -----  Who Called: Christie Marcum    Refill or New Rx:Refill    RX Name and Strength:venlafaxine (EFFEXOR-XR) 37.5 MG 24 hr capsule  RX Name and Strength:lisinopriL (PRINIVIL,ZESTRIL) 40 MG table  RX Name and Strength:hydrOXYzine pamoate (VISTARIL) 25 MG Cap  RX Name and Strength:amLODIPine (NORVASC) 5 MG tablet   How is the patient currently taking it? (ex. 1XDay):1x  Is this a 30 day or 90 day RX:  Local or Mail Order:  List of preferred pharmacies on file (remove unneeded): [unfilled]  If different Pharmacy is requested, enter Pharmacy information here including location and phone number: [facility] [place] - # [phone] F# [phone]  Ordering Provider:    Patient's Preferred Phone Number on File: [phone]   Best Call Back Number, if different:    Additional Information: pt isnt getting medication from [facility] when needed/ on time , asked that above medications are sent to local pharmacy

## 2024-11-21 NOTE — TELEPHONE ENCOUNTER
----- Message from Byron sent at 11/21/2024  1:03 PM CST -----  Who Called: Christie Marcum    Refill or New Rx:Refill    RX Name and Strength:venlafaxine (EFFEXOR-XR) 37.5 MG 24 hr capsule  RX Name and Strength:lisinopriL (PRINIVIL,ZESTRIL) 40 MG table  RX Name and Strength:hydrOXYzine pamoate (VISTARIL) 25 MG Cap  RX Name and Strength:amLODIPine (NORVASC) 5 MG tablet   How is the patient currently taking it? (ex. 1XDay):1x  Is this a 30 day or 90 day RX:  Local or Mail Order:  List of preferred pharmacies on file (remove unneeded): [unfilled]  If different Pharmacy is requested, enter Pharmacy information here including location and phone number: [facility] [place] - # [phone] F# [phone]  Ordering Provider:    Patient's Preferred Phone Number on File: [phone]   Best Call Back Number, if different:    Additional Information: pt isnt getting medication from [facility] when needed/ on time , asked that above medications are sent to local pharmacy

## 2024-11-21 NOTE — TELEPHONE ENCOUNTER
PT request refill; fax updated meds below to new pharmacy.  topiramate (TOPAMAX) 100 MG tablet BID  lacosamide (VIMPAT) 200 mg Tab tablet every 12h  gabapentin (NEURONTIN) 300 MG capsule nightly  clonazePAM (KLONOPIN) 1 MG tablet PRN nightly    New prescription update to [id number] - [place], [place] - [address]     Please advise.   P: [phone]  F: [phone]

## 2024-11-26 DIAGNOSIS — M54.9 DORSALGIA, UNSPECIFIED: ICD-10-CM

## 2024-11-26 RX ORDER — VENLAFAXINE HYDROCHLORIDE 75 MG/1
75 CAPSULE, EXTENDED RELEASE ORAL DAILY
Qty: 90 CAPSULE | Refills: 0 | Status: SHIPPED | OUTPATIENT
Start: 2024-11-26

## 2024-11-26 RX ORDER — GABAPENTIN 300 MG/1
300 CAPSULE ORAL NIGHTLY
Qty: 30 CAPSULE | Refills: 11 | Status: SHIPPED | OUTPATIENT
Start: 2024-11-26 | End: 2025-11-26

## 2024-11-26 NOTE — TELEPHONE ENCOUNTER
----- Message from Caty sent at 11/26/2024 10:52 AM CST -----  .Who Called: Christie Marcum    Refill or New Rx:Refill  RX Name and Strength:venlafaxine (EFFEXOR-XR) 75 MG 24 hr capsule  How is the patient currently taking it? (ex. 1XDay):1x day  Is this a 30 day or 90 day RX:90  Local or Mail Order:local  List of preferred pharmacies on file (remove unneeded): [unfilled]  If different Pharmacy is requested, enter Pharmacy information here including location and phone number: same   Ordering Provider:Carolyn      Preferred Method of Contact: Phone Call  Patient's Preferred Phone Number on File: [phone]   Best Call Back Number, if different:  Additional Information: refills

## 2024-12-16 ENCOUNTER — LAB VISIT (OUTPATIENT)
Dept: LAB | Facility: HOSPITAL | Age: 64
End: 2024-12-16
Payer: MEDICARE

## 2024-12-16 DIAGNOSIS — E87.20 METABOLIC ACIDOSIS: ICD-10-CM

## 2024-12-16 DIAGNOSIS — E66.01 CLASS 3 SEVERE OBESITY DUE TO EXCESS CALORIES WITH SERIOUS COMORBIDITY AND BODY MASS INDEX (BMI) OF 45.0 TO 49.9 IN ADULT: ICD-10-CM

## 2024-12-16 DIAGNOSIS — N18.31 STAGE 3A CHRONIC KIDNEY DISEASE: ICD-10-CM

## 2024-12-16 DIAGNOSIS — I10 PRIMARY HYPERTENSION: ICD-10-CM

## 2024-12-16 DIAGNOSIS — E83.39 HYPOPHOSPHATEMIA: ICD-10-CM

## 2024-12-16 DIAGNOSIS — E66.813 CLASS 3 SEVERE OBESITY DUE TO EXCESS CALORIES WITH SERIOUS COMORBIDITY AND BODY MASS INDEX (BMI) OF 45.0 TO 49.9 IN ADULT: ICD-10-CM

## 2024-12-16 LAB
ALBUMIN SERPL-MCNC: 3.4 G/DL (ref 3.4–4.8)
ALBUMIN/GLOB SERPL: 0.8 RATIO (ref 1.1–2)
ALP SERPL-CCNC: 74 UNIT/L (ref 40–150)
ALT SERPL-CCNC: 24 UNIT/L (ref 0–55)
ANION GAP SERPL CALC-SCNC: 8 MEQ/L
AST SERPL-CCNC: 19 UNIT/L (ref 5–34)
BASOPHILS # BLD AUTO: 0.04 X10(3)/MCL
BASOPHILS NFR BLD AUTO: 0.6 %
BILIRUB SERPL-MCNC: 0.4 MG/DL
BUN SERPL-MCNC: 7.4 MG/DL (ref 9.8–20.1)
CALCIUM SERPL-MCNC: 9.2 MG/DL (ref 8.4–10.2)
CHLORIDE SERPL-SCNC: 111 MMOL/L (ref 98–107)
CO2 SERPL-SCNC: 24 MMOL/L (ref 23–31)
CREAT SERPL-MCNC: 1.13 MG/DL (ref 0.55–1.02)
CREAT UR-MCNC: 154.2 MG/DL (ref 45–106)
CREAT/UREA NIT SERPL: 7
EOSINOPHIL # BLD AUTO: 0.34 X10(3)/MCL (ref 0–0.9)
EOSINOPHIL NFR BLD AUTO: 5.3 %
ERYTHROCYTE [DISTWIDTH] IN BLOOD BY AUTOMATED COUNT: 12.7 % (ref 11.5–17)
GFR SERPLBLD CREATININE-BSD FMLA CKD-EPI: 54 ML/MIN/1.73/M2
GLOBULIN SER-MCNC: 4.1 GM/DL (ref 2.4–3.5)
GLUCOSE SERPL-MCNC: 104 MG/DL (ref 82–115)
HCT VFR BLD AUTO: 42.3 % (ref 37–47)
HGB BLD-MCNC: 13.3 G/DL (ref 12–16)
IMM GRANULOCYTES # BLD AUTO: 0.02 X10(3)/MCL (ref 0–0.04)
IMM GRANULOCYTES NFR BLD AUTO: 0.3 %
LYMPHOCYTES # BLD AUTO: 1.42 X10(3)/MCL (ref 0.6–4.6)
LYMPHOCYTES NFR BLD AUTO: 22.2 %
MCH RBC QN AUTO: 28.3 PG (ref 27–31)
MCHC RBC AUTO-ENTMCNC: 31.4 G/DL (ref 33–36)
MCV RBC AUTO: 90 FL (ref 80–94)
MONOCYTES # BLD AUTO: 0.65 X10(3)/MCL (ref 0.1–1.3)
MONOCYTES NFR BLD AUTO: 10.2 %
NEUTROPHILS # BLD AUTO: 3.92 X10(3)/MCL (ref 2.1–9.2)
NEUTROPHILS NFR BLD AUTO: 61.4 %
NRBC BLD AUTO-RTO: 0 %
PHOSPHATE SERPL-MCNC: 2.6 MG/DL (ref 2.3–4.7)
PLATELET # BLD AUTO: 237 X10(3)/MCL (ref 130–400)
PMV BLD AUTO: 9.3 FL (ref 7.4–10.4)
POTASSIUM SERPL-SCNC: 3.5 MMOL/L (ref 3.5–5.1)
PROT SERPL-MCNC: 7.5 GM/DL (ref 5.8–7.6)
PROT UR STRIP-MCNC: 11.2 MG/DL
PTH-INTACT SERPL-MCNC: 104.8 PG/ML (ref 8.7–77)
RBC # BLD AUTO: 4.7 X10(6)/MCL (ref 4.2–5.4)
SODIUM SERPL-SCNC: 143 MMOL/L (ref 136–145)
URINE PROTEIN/CREATININE RATIO (OLG): 0.1
WBC # BLD AUTO: 6.39 X10(3)/MCL (ref 4.5–11.5)

## 2024-12-16 PROCEDURE — 84100 ASSAY OF PHOSPHORUS: CPT

## 2024-12-16 PROCEDURE — 82570 ASSAY OF URINE CREATININE: CPT

## 2024-12-16 PROCEDURE — 80053 COMPREHEN METABOLIC PANEL: CPT

## 2024-12-16 PROCEDURE — 85025 COMPLETE CBC W/AUTO DIFF WBC: CPT

## 2024-12-16 PROCEDURE — 83970 ASSAY OF PARATHORMONE: CPT

## 2024-12-16 PROCEDURE — 36415 COLL VENOUS BLD VENIPUNCTURE: CPT

## 2024-12-18 ENCOUNTER — OFFICE VISIT (OUTPATIENT)
Dept: NEPHROLOGY | Facility: CLINIC | Age: 64
End: 2024-12-18
Payer: MEDICARE

## 2024-12-18 VITALS
SYSTOLIC BLOOD PRESSURE: 138 MMHG | RESPIRATION RATE: 20 BRPM | BODY MASS INDEX: 45.92 KG/M2 | OXYGEN SATURATION: 98 % | TEMPERATURE: 98 F | HEART RATE: 101 BPM | WEIGHT: 275.63 LBS | HEIGHT: 65 IN | DIASTOLIC BLOOD PRESSURE: 82 MMHG

## 2024-12-18 DIAGNOSIS — E66.813 CLASS 3 SEVERE OBESITY DUE TO EXCESS CALORIES WITH SERIOUS COMORBIDITY AND BODY MASS INDEX (BMI) OF 45.0 TO 49.9 IN ADULT: ICD-10-CM

## 2024-12-18 DIAGNOSIS — I10 PRIMARY HYPERTENSION: ICD-10-CM

## 2024-12-18 DIAGNOSIS — E66.01 CLASS 3 SEVERE OBESITY DUE TO EXCESS CALORIES WITH SERIOUS COMORBIDITY AND BODY MASS INDEX (BMI) OF 45.0 TO 49.9 IN ADULT: ICD-10-CM

## 2024-12-18 DIAGNOSIS — N18.31 STAGE 3A CHRONIC KIDNEY DISEASE: Primary | ICD-10-CM

## 2024-12-18 PROCEDURE — 99214 OFFICE O/P EST MOD 30 MIN: CPT | Mod: S$GLB,,,

## 2024-12-18 PROCEDURE — 3079F DIAST BP 80-89 MM HG: CPT | Mod: CPTII,S$GLB,,

## 2024-12-18 PROCEDURE — 1159F MED LIST DOCD IN RCRD: CPT | Mod: CPTII,S$GLB,,

## 2024-12-18 PROCEDURE — 3008F BODY MASS INDEX DOCD: CPT | Mod: CPTII,S$GLB,,

## 2024-12-18 PROCEDURE — 3075F SYST BP GE 130 - 139MM HG: CPT | Mod: CPTII,S$GLB,,

## 2024-12-18 PROCEDURE — 3066F NEPHROPATHY DOC TX: CPT | Mod: CPTII,S$GLB,,

## 2024-12-18 PROCEDURE — 4010F ACE/ARB THERAPY RXD/TAKEN: CPT | Mod: CPTII,S$GLB,,

## 2024-12-18 PROCEDURE — 99999 PR PBB SHADOW E&M-EST. PATIENT-LVL V: CPT | Mod: PBBFAC,,,

## 2024-12-18 RX ORDER — AMLODIPINE BESYLATE 5 MG/1
10 TABLET ORAL DAILY
Qty: 180 TABLET | Refills: 3 | Status: SHIPPED | OUTPATIENT
Start: 2024-12-18 | End: 2025-12-18

## 2024-12-18 NOTE — PROGRESS NOTES
[facility] Note    HPI  Christie Marcum, 64 y.o. female, presents to office for CKD.  Patient's history significant for hypertension.  She has had cancer of splenic flexure which was removed, received a couple of months of chemotherapy a few years ago.   She has been dealing with soft stools, no appetite.  Has a GI evaluation on December 24th.  She has no acute renal complaints today.  Only drinks water.      Medical Diagnoses:   Past Medical History:   Diagnosis Date    Anxiety     Arthritis     Asthma     Cancer of splenic flexure     CKD (chronic kidney disease)     COPD (chronic obstructive pulmonary disease) 07/05/2022    Decreased hearing of left ear 12/07/2022    Depressive disorder 07/05/2022    Epilepsy 07/05/2022    Has had seizures since the age of 13. Initial seizure was in the setting of acute illness with hydronephrosis and continued to have seizures frequently. Used to stare and throw dolls at her sister and did not know they were seizures.  SHe used to have 30-40 seizures a month. Currently seizure frequency is about 1 every 6 months. She is on vimpat 200 BID, topamax 200 BID. She also takes clorazepate    Gastroesophageal reflux disease 07/05/2022    Glaucoma 07/05/2022    Hypertension     IGT (impaired glucose tolerance)     Low back pain 09/21/2022    Moderate persistent asthma without complication 08/25/2022    Neuropathy 07/05/2022    On home oxygen therapy 09/21/2022    KHANH on CPAP     Overactive bladder 07/05/2022    Seasonal allergies 11/02/2022     Patient Active Problem List   Diagnosis    Overactive bladder    Family history of diabetes mellitus    Breast cancer screening by mammogram    Anxiety    Cancer of splenic flexure    Colon cancer    Depressive disorder    Epilepsy    Fibroadenoma of breast    Gastroesophageal reflux disease    Glaucoma    Hypertension    KHANH on CPAP    Class 3 severe obesity with serious comorbidity and body mass index (BMI) of 45.0 to  49.9 in adult    COPD (chronic obstructive pulmonary disease)    Neuropathy    Moderate persistent asthma without complication    Low back pain    Arthritis    Arthralgia of hip    Advanced care planning/counseling discussion    Seasonal allergies    Postmenopausal    Decreased hearing of left ear    Acute pain of both knees    Episodic confusion    Peripheral polyneuropathy    Stage 3a chronic kidney disease    Chronic pain of right knee    Hypophosphatemia    Metabolic acidosis    Lumbar spondylosis       Surgical History:   Past Surgical History:   Procedure Laterality Date    COLON SURGERY      COLONOSCOPY      COMPLEX CYSTOMETROGRAM      EMG      HYSTERECTOMY      Left breas biopsy      Right breast biopsy         Family History:   Family History   Problem Relation Name Age of Onset    Hypertension Mother      Stroke Mother      Hypertension Father      Stroke Father      Hyperlipidemia Sister      Kidney disease Sister         Social History:   Social History     Tobacco Use    Smoking status: Never     Passive exposure: Never    Smokeless tobacco: Never   Substance Use Topics    Alcohol use: Not Currently       Allergies:  Review of patient's allergies indicates:   Allergen Reactions    Cephalexin      Other reaction(s): Seizure  hallucinations    Other reaction(s): Unknown    Ciprofloxacin      Other reaction(s): Seiurus  Other reaction(s): Unknown    Codeine      Other reaction(s): Seizure  seizure      Metronidazole      Other reaction(s): Seizure    Metronidazole hcl      Other reaction(s): Unknown    Naproxen      Other reaction(s): Seizure  Nausea/seizure      Pantoprazole sodium      Other reaction(s): Unknown    Tetracycline      Other reaction(s): Seizure  seizure      Tramadol Other (See Comments)     Other reaction(s): Seizure  SEIZURES HALLUCINATION         Medications:    Current Outpatient Medications:     albuterol-ipratropium (DUO-NEB) 2.5 mg-0.5 mg/3 mL nebulizer solution, PLEASE SEE ATTACHED  FOR DETAILED DIRECTIONS, Disp: , Rfl:     amLODIPine (NORVASC) 5 MG tablet, Take 1 tablet (5 mg total) by mouth once daily., Disp: 90 tablet, Rfl: 0    budesonide (PULMICORT) 0.5 mg/2 mL nebulizer solution, Take 0.5 mg by nebulization daily as needed., Disp: , Rfl:     clonazePAM (KLONOPIN) 1 MG tablet, Take 0.5 tablets (0.5 mg total) by mouth nightly as needed for Anxiety., Disp: 15 tablet, Rfl: 2    gabapentin (NEURONTIN) 300 MG capsule, Take 1 capsule (300 mg total) by mouth every evening., Disp: 30 capsule, Rfl: 11    hydrOXYzine pamoate (VISTARIL) 25 MG Cap, Take 2 capsules (50 mg total) by mouth 2 (two) times daily., Disp: 360 capsule, Rfl: 0    lacosamide (VIMPAT) 200 mg Tab tablet, Take 1 tablet (200 mg total) by mouth every 12 (twelve) hours., Disp: 60 tablet, Rfl: 5    latanoprost 0.005 % ophthalmic solution, Place 1 drop into both eyes nightly., Disp: , Rfl:     levocetirizine (XYZAL) 5 MG tablet, Take 5 mg by mouth every evening., Disp: , Rfl:     lisinopriL (PRINIVIL,ZESTRIL) 40 MG tablet, Take 1 tablet (40 mg total) by mouth once daily., Disp: 90 tablet, Rfl: 3    miscellaneous medical supply Kit, 1 each by Misc.(Non-Drug; Combo Route) route Daily. ROUND SHOWER STOOL; NEEDED FOR 99 MONTHS, Disp: 1 kit, Rfl: 0    nebulizer and compressor Eli, 1 each by Misc.(Non-Drug; Combo Route) route every 6 (six) hours as needed (wheezing)., Disp: 1 each, Rfl: 0    topiramate (TOPAMAX) 100 MG tablet, Take 1.5 tablets (150 mg total) by mouth 2 (two) times daily., Disp: 270 tablet, Rfl: 3    venlafaxine (EFFEXOR-XR) 37.5 MG 24 hr capsule, Take 1 capsule (37.5 mg total) by mouth once daily. To take with 75mg once daily, Disp: 90 capsule, Rfl: 3    venlafaxine (EFFEXOR-XR) 75 MG 24 hr capsule, Take 1 capsule (75 mg total) by mouth once daily., Disp: 90 capsule, Rfl: 0       Review of Systems:    Constitutional: Denies fever, fatigue, generalized weakness  Skin: Denies wounds, no rashes, no itching, no new skin  "lesions  Respiratory:  Denies cough, shortness of breath, or wheezing  Cardiovascular: Denies chest pain, palpitations, or swelling  Gastrointestional: + recent vomiting and diarrhea a few days before labs were drawn  Genitourinary: Denies dysuria, hematuria, foamy urine, or incontinence; reports able to empty bladder  Musculoskeletal: Denies back or flank pain  Neurological: Denies headaches, dizziness, paresthesias, tremors or focal weakness      Vital Signs:  /82 (BP Location: Right arm, Patient Position: Sitting)   Pulse 101   Temp 98.1 °F (36.7 °C) (Temporal)   Resp 20   Ht 5' 5" (1.651 m)   Wt 125 kg (275 lb 9.6 oz)   LMP  (LMP Unknown)   SpO2 98%   BMI 45.86 kg/m²   Body mass index is 45.86 kg/m².      Physical Exam:    General: no acute distress, awake, alert  Eyes: conjunctiva clear, eyelids without swelling  HENT: atraumatic, oropharynx and nasal mucosa patent  Neck: supple, trache midline, full ROM, no JVD  Respiratory: equal, unlabored, clear to auscultation A/P  Cardiovascular: RRR without murmur or rub  Edema: none  Gastrointestinal: soft, non-tender, non-distended; positive bowel sounds; no masses to palpation; no ascites  Genitourinary: no CVA tenderness upon palpation  Musculoskeletal: ROM without new limitation or discomfort  Integumentary: warm, dry; no rashes, wounds, or skin lesions  Neurological: oriented x4, appropriate, no acute deficits; no asterixis      Labs:        Component Value Date/Time     12/16/2024 1330     07/02/2024 1315     12/15/2023 0707    K 3.5 12/16/2024 1330    K 3.6 07/02/2024 1315    K 3.9 12/15/2023 0707     (H) 12/16/2024 1330     (H) 07/02/2024 1315     12/15/2023 0707    CO2 24 12/16/2024 1330    CO2 20 (L) 07/02/2024 1315    CO2 18 (L) 12/15/2023 0707    BUN 7.4 (L) 12/16/2024 1330    BUN 11.1 07/02/2024 1315    BUN 14 12/15/2023 0707    CREATININE 1.13 (H) 12/16/2024 1330    CREATININE 1.23 (H) 07/02/2024 1315    " CREATININE 1.20 (H) 06/12/2024 1351    CREATININE 1.33 (H) 03/12/2024 0908    CREATININE 1.11 (H) 12/15/2023 0707    CALCIUM 9.2 12/16/2024 1330    CALCIUM 9.5 07/02/2024 1315    CALCIUM 9.5 12/15/2023 0707    PHOS 2.6 12/16/2024 1330    PHOS 2.4 07/02/2024 1315    .8 (H) 12/16/2024 1330           Component Value Date/Time    WBC 6.39 12/16/2024 1330    WBC 8.92 06/12/2024 1351    HGB 13.3 12/16/2024 1330    HGB 14.3 06/12/2024 1351    HCT 42.3 12/16/2024 1330    HCT 45.6 06/12/2024 1351     12/16/2024 1330     06/12/2024 1351         Impression:    1. Stage 3a chronic kidney disease        2. Primary hypertension        3. Class 3 severe obesity due to excess calories with serious comorbidity and body mass index (BMI) of 45.0 to 49.9 in adult          CKD 3A most likely related to nephrosclerosis and hyper filtration syndrome; renal function stable  Blood pressure:  Systolic between 120s to 150s at home on lisinopril and amlodipine  Metabolic acidosis resolved  No anemia  No significant proteinuria  Maintained on ACE inhibitor      Plan:  Increase amlodipine to 10 mg daily  Main line of management is control of blood pressure, weight loss, avoiding nephrotoxic agents  Otherwise follow up in 6 months    Avoid NSAIDs (Aleve (naproxen), Mobic, Celebrex, Advil (ibuprofen), Toradol and Diclofenac).  Only take tylenol (acetaminophen) occasionally if needed for aches/pains.    Recommend low sodium diet:  Avoid high salt foods (olives, pickles, smoked meats, deli meats, salted potato chips, fast food, etc.).   Do not add salt to your food at the table.   Use only small amounts of salt when cooking.    Recommended Daily Protein Intake for chronic kidney disease:  0.8 g/kg = less than 100 g daily    Recommend a healthy lifestyle for weight loss:  Light to moderate exercise as tolerated  Low carbohydrate, low fat diet  Portion control    Avoid alcohol and soda. Limit tea and coffee.     Stay well hydrated  with water    Patient to call our office with any concerns prior to next appointment.    Chance PEDRAZA      This note was created with the assistance of [employer] voice recognition software or phone dictation. There may be transcription errors as a result of using this technology however minimal. Effort has been made to assure accuracy of transcription but any obvious errors or omissions should be clarified with the author of the document.

## 2024-12-18 NOTE — PATIENT INSTRUCTIONS
Amlodipine to 10 mg daily    Blood pressure goal: consistently less than 130/85    Avoid NSAIDs and COX2 inhibitors: Advil (ibuprofen), Aleve (naproxen), Mobic (meloxicam), Celebrex (celecoxib), Toradol (ketorolac) and Diclofenac (voltaren), Indomethacin (indocin).    Only take tylenol (acetaminophen) occasionally if needed for aches/pains.    Recommend low sodium diet:  Less than 2,000 mg per day  Avoid high salt foods (olives, pickles, smoked meats, deli meats, salted potato chips, fast food, etc.).   Do not add salt to your food at the table.   Use only small amounts of salt when cooking.    Renal Diet:  Reduce intake of nuts, peanut butter, milk, cheese, dried beans, and peas     Recommend a healthy lifestyle for weight management:  Light to moderate exercise, increasing as tolerated  Low carbohydrate, low fat diet  Portion control    Avoid alcohol and soda. Limit tea and coffee.     Stay well hydrated with water      Call our office with concerns prior to next appointment    CHRONIC KIDNEY DISEASE BASIC INFORMATION:    Your kidneys do many important jobs. Some of the ways they keep your whole body in balance include:  Removing natural waste products and extra water from your body  Helping make red blood cells  Balancing important minerals in your body  Helping maintain your blood pressure  Keeping your bones healthy    Chronic kidney disease (CKD) is when the kidneys have become damaged over time (for at least 3 months) and have a hard time doing all their important jobs. CKD also increases the risk of other health problems like heart disease and stroke. Developing CKD is usually a very slow process with very few symptoms at first.    Risk Factors  Anyone can develop CKD - at any age. However, some people are at a higher risk than others. The most common CKD risk factors are:  Diabetes  High blood pressure (hypertension)  Heart disease and/or heart failure  Obesity  Over the age of 60  Family history of CKD or  kidney failure  Personal history of acute kidney injury (ISMAEL)  Smoking and/or use of tobacco products    For many people, CKD is not caused by just one reason. Instead, it is a result of many physical, environmental, and social factors.      For more education on kidney disease you can visit:  https://[email or web address].org/kidney-basics

## 2024-12-24 ENCOUNTER — OFFICE VISIT (OUTPATIENT)
Dept: FAMILY MEDICINE | Facility: CLINIC | Age: 64
End: 2024-12-24
Payer: MEDICARE

## 2024-12-24 VITALS
TEMPERATURE: 97 F | HEIGHT: 65 IN | WEIGHT: 275.81 LBS | DIASTOLIC BLOOD PRESSURE: 74 MMHG | RESPIRATION RATE: 19 BRPM | BODY MASS INDEX: 45.95 KG/M2 | SYSTOLIC BLOOD PRESSURE: 134 MMHG | HEART RATE: 83 BPM | OXYGEN SATURATION: 96 %

## 2024-12-24 DIAGNOSIS — N18.31 STAGE 3A CHRONIC KIDNEY DISEASE: ICD-10-CM

## 2024-12-24 DIAGNOSIS — F32.A DEPRESSIVE DISORDER: ICD-10-CM

## 2024-12-24 DIAGNOSIS — Z78.0 POSTMENOPAUSAL: ICD-10-CM

## 2024-12-24 DIAGNOSIS — K92.1 BLOODY STOOL: ICD-10-CM

## 2024-12-24 DIAGNOSIS — J30.2 SEASONAL ALLERGIES: ICD-10-CM

## 2024-12-24 DIAGNOSIS — Z71.89 ADVANCED CARE PLANNING/COUNSELING DISCUSSION: ICD-10-CM

## 2024-12-24 DIAGNOSIS — J44.9 CHRONIC OBSTRUCTIVE PULMONARY DISEASE, UNSPECIFIED COPD TYPE: ICD-10-CM

## 2024-12-24 DIAGNOSIS — E66.813 CLASS 3 SEVERE OBESITY DUE TO EXCESS CALORIES WITH SERIOUS COMORBIDITY AND BODY MASS INDEX (BMI) OF 45.0 TO 49.9 IN ADULT: ICD-10-CM

## 2024-12-24 DIAGNOSIS — Z23 NEED FOR VACCINATION AGAINST STREPTOCOCCUS PNEUMONIAE: ICD-10-CM

## 2024-12-24 DIAGNOSIS — M47.816 LUMBAR SPONDYLOSIS: ICD-10-CM

## 2024-12-24 DIAGNOSIS — K21.9 GASTROESOPHAGEAL REFLUX DISEASE WITHOUT ESOPHAGITIS: ICD-10-CM

## 2024-12-24 DIAGNOSIS — Z12.31 ENCOUNTER FOR SCREENING MAMMOGRAM FOR MALIGNANT NEOPLASM OF BREAST: ICD-10-CM

## 2024-12-24 DIAGNOSIS — M25.561 ACUTE PAIN OF BOTH KNEES: ICD-10-CM

## 2024-12-24 DIAGNOSIS — R79.9 ABNORMAL FINDING OF BLOOD CHEMISTRY, UNSPECIFIED: ICD-10-CM

## 2024-12-24 DIAGNOSIS — Z00.00 WELLNESS EXAMINATION: Primary | ICD-10-CM

## 2024-12-24 DIAGNOSIS — G47.33 OSA ON CPAP: ICD-10-CM

## 2024-12-24 DIAGNOSIS — M25.562 ACUTE PAIN OF BOTH KNEES: ICD-10-CM

## 2024-12-24 DIAGNOSIS — G62.9 PERIPHERAL POLYNEUROPATHY: ICD-10-CM

## 2024-12-24 DIAGNOSIS — E66.01 CLASS 3 SEVERE OBESITY DUE TO EXCESS CALORIES WITH SERIOUS COMORBIDITY AND BODY MASS INDEX (BMI) OF 45.0 TO 49.9 IN ADULT: ICD-10-CM

## 2024-12-24 DIAGNOSIS — G40.909 NONINTRACTABLE EPILEPSY WITHOUT STATUS EPILEPTICUS, UNSPECIFIED EPILEPSY TYPE: ICD-10-CM

## 2024-12-24 DIAGNOSIS — G62.9 NEUROPATHY: ICD-10-CM

## 2024-12-24 DIAGNOSIS — N32.81 OVERACTIVE BLADDER: ICD-10-CM

## 2024-12-24 DIAGNOSIS — Z23 NEED FOR PNEUMOCOCCAL VACCINATION: ICD-10-CM

## 2024-12-24 DIAGNOSIS — J45.40 MODERATE PERSISTENT ASTHMA WITHOUT COMPLICATION: ICD-10-CM

## 2024-12-24 DIAGNOSIS — H40.9 GLAUCOMA OF BOTH EYES, UNSPECIFIED GLAUCOMA TYPE: ICD-10-CM

## 2024-12-24 DIAGNOSIS — I10 PRIMARY HYPERTENSION: ICD-10-CM

## 2024-12-24 PROCEDURE — 4010F ACE/ARB THERAPY RXD/TAKEN: CPT | Mod: CPTII,,, | Performed by: NURSE PRACTITIONER

## 2024-12-24 PROCEDURE — 3078F DIAST BP <80 MM HG: CPT | Mod: CPTII,,, | Performed by: NURSE PRACTITIONER

## 2024-12-24 PROCEDURE — 90677 PCV20 VACCINE IM: CPT | Mod: ,,, | Performed by: NURSE PRACTITIONER

## 2024-12-24 PROCEDURE — 1160F RVW MEDS BY RX/DR IN RCRD: CPT | Mod: CPTII,,, | Performed by: NURSE PRACTITIONER

## 2024-12-24 PROCEDURE — 1159F MED LIST DOCD IN RCRD: CPT | Mod: CPTII,,, | Performed by: NURSE PRACTITIONER

## 2024-12-24 PROCEDURE — G0439 PPPS, SUBSEQ VISIT: HCPCS | Mod: ,,, | Performed by: NURSE PRACTITIONER

## 2024-12-24 PROCEDURE — G0009 ADMIN PNEUMOCOCCAL VACCINE: HCPCS | Mod: ,,, | Performed by: NURSE PRACTITIONER

## 2024-12-24 PROCEDURE — 3075F SYST BP GE 130 - 139MM HG: CPT | Mod: CPTII,,, | Performed by: NURSE PRACTITIONER

## 2024-12-24 PROCEDURE — 3066F NEPHROPATHY DOC TX: CPT | Mod: CPTII,,, | Performed by: NURSE PRACTITIONER

## 2024-12-24 NOTE — ASSESSMENT & PLAN NOTE
Stable  Pt would like referral to chiropractor; referral placed  Keep appt with PCP for follow up

## 2024-12-24 NOTE — ASSESSMENT & PLAN NOTE
States occasional bloody stools; Increased GERD; nausea  Desires referral to GI  States will contact me when she switches insurance for GI referral  Instructed to continue to monitor symptoms  Report to ED for any syncope,dizziness, weakness, CP, SOB, and/or worsening symptoms  Pt is agreeable to plan and verbalizes understanding

## 2024-12-24 NOTE — PROGRESS NOTES
Family Medicine      Patient ID: [id number]     Chief Complaint: Medicare Annual Wellness     HPI:     Christie Marcum is a 64 y.o. female here today for a Medicare Annual Wellness visit and comprehensive Health Risk Assessment.     Health Maintenance         Date Due Completion Date    Annual UACr Never done ---    Shingles Vaccine (2 of 2) 06/15/2023 4/20/2023    COVID-19 Vaccine (6 - 2024-25 season) 09/01/2024 12/15/2023    Mammogram 01/12/2025 1/12/2024    Hemoglobin A1c (Diabetic Prevention Screening) 12/15/2026 12/15/2023    Lipid Panel 12/15/2028 12/15/2023    TETANUS VACCINE 09/09/2030 9/9/2020    Colorectal Cancer Screening 06/10/2031 6/10/2021        Labs due and ordered  MMG due and ordered  Prevnar due; given in office today    Past Medical History:   Diagnosis Date    Anxiety     Arthritis     Asthma     Cancer of splenic flexure     CKD (chronic kidney disease)     COPD (chronic obstructive pulmonary disease) 07/05/2022    Decreased hearing of left ear 12/07/2022    Depressive disorder 07/05/2022    Epilepsy 07/05/2022    Has had seizures since the age of 13. Initial seizure was in the setting of acute illness with hydronephrosis and continued to have seizures frequently. Used to stare and throw dolls at her sister and did not know they were seizures.  SHe used to have 30-40 seizures a month. Currently seizure frequency is about 1 every 6 months. She is on vimpat 200 BID, topamax 200 BID. She also takes clorazepate    Gastroesophageal reflux disease 07/05/2022    Glaucoma 07/05/2022    Hypertension     IGT (impaired glucose tolerance)     Low back pain 09/21/2022    Moderate persistent asthma without complication 08/25/2022    Neuropathy 07/05/2022    On home oxygen therapy 09/21/2022    KHANH on CPAP     Overactive bladder 07/05/2022    Seasonal allergies 11/02/2022        Past Surgical History:   Procedure Laterality Date    COLON SURGERY      COLONOSCOPY      COMPLEX CYSTOMETROGRAM      EMG       HYSTERECTOMY      Left breas biopsy      Right breast biopsy          Social History     Socioeconomic History    Marital status:    Occupational History    Occupation: unemployed   Tobacco Use    Smoking status: Never     Passive exposure: Never    Smokeless tobacco: Never   Substance and Sexual Activity    Alcohol use: Not Currently    Drug use: Never        Family History   Problem Relation Name Age of Onset    Hypertension Mother      Stroke Mother      Hypertension Father      Stroke Father      Hyperlipidemia Sister      Kidney disease Sister          Current Outpatient Medications   Medication Instructions    albuterol-ipratropium (DUO-NEB) 2.5 mg-0.5 mg/3 mL nebulizer solution PLEASE SEE ATTACHED FOR DETAILED DIRECTIONS    amLODIPine (NORVASC) 10 mg, Oral, Daily    budesonide (PULMICORT) 0.5 mg, Daily PRN    clonazePAM (KLONOPIN) 0.5 mg, Oral, Nightly PRN    gabapentin (NEURONTIN) 300 mg, Oral, Nightly    hydrOXYzine pamoate (VISTARIL) 50 mg, Oral, 2 times daily    lacosamide (VIMPAT) 200 mg, Oral, Every 12 hours    latanoprost 0.005 % ophthalmic solution 1 drop, Nightly    levocetirizine (XYZAL) 5 mg, Nightly    lisinopriL (PRINIVIL,ZESTRIL) 40 mg, Oral, Daily    miscellaneous medical supply Kit 1 each, Misc.(Non-Drug; Combo Route), Daily, ROUND SHOWER STOOL; NEEDED FOR 99 MONTHS    nebulizer and compressor Eli 1 each, Misc.(Non-Drug; Combo Route), Every 6 hours PRN    topiramate (TOPAMAX) 150 mg, Oral, 2 times daily    venlafaxine (EFFEXOR-XR) 37.5 mg, Oral, Daily, To take with 75mg once daily    venlafaxine (EFFEXOR-XR) 75 mg, Oral, Daily       Review of patient's allergies indicates:   Allergen Reactions    Cephalexin      Other reaction(s): Seizure  hallucinations    Other reaction(s): Unknown    Ciprofloxacin      Other reaction(s): Seiurus  Other reaction(s): Unknown    Codeine      Other reaction(s): Seizure  seizure      Metronidazole      Other reaction(s): Seizure    Metronidazole hcl       Other reaction(s): Unknown    Naproxen      Other reaction(s): Seizure  Nausea/seizure      Pantoprazole sodium      Other reaction(s): Unknown    Tetracycline      Other reaction(s): Seizure  seizure      Tramadol Other (See Comments)     Other reaction(s): Seizure  SEIZURES HALLUCINATION          Immunization History   Administered Date(s) Administered    COVID-19, MRNA, LN-S, PF (Pfizer) (Gray Cap) 08/06/2022    COVID-19, MRNA, LN-S, PF (Pfizer) (Purple Cap) 12/07/2021    COVID-19, mRNA, LNP-S, PF, marcella-sucrose, 30 mcg/0.3 mL (Pfizer Ages 12+) 12/15/2023    COVID-19, vector-nr, rS-Ad26, PF (Kristi) 03/08/2021, 03/08/2021    Influenza 01/08/2019, 11/19/2019    Influenza (FLUBLOK) - Quadrivalent - Recombinant - PF *Preferred* (egg allergy) 10/20/2020    Influenza - Quadrivalent - PF *Preferred* (6 months and older) 10/30/2021, 10/26/2022, 09/22/2023    Influenza - Trivalent - Fluarix, Flulaval, Fluzone, Afluria - PF 11/16/2017, 01/08/2019, 11/19/2019, 10/20/2020    Influenza - Trivalent - Flucelvax - PF 11/12/2024    Pneumococcal Conjugate - 20 Valent 12/24/2024    RSVpreF (Arexvy) 04/26/2024    Tdap 09/09/2020    Zoster Recombinant 04/20/2023        Patient Care Team:  Mary Leon MD as PCP - General (Family Medicine)  Yamile Mortensen MD (Pulmonary Disease)  Cornelio Waggoner MD as Consulting Physician (Gastroenterology)  Indira Worely MD as Consulting Physician (Neurology)  Tahir Ahmadi MD as Consulting Physician (Oncology)  Mitch Flor MD as Consulting Physician (Urology)    Subjective:     Review of Systems   Constitutional: Negative.    HENT: Negative.     Eyes: Negative.    Respiratory: Negative.     Cardiovascular: Negative.    Gastrointestinal:  Positive for abdominal pain, blood in stool and nausea.   Endocrine: Negative.    Genitourinary: Negative.    Musculoskeletal: Negative.         Knee pain   Skin: Negative.    Neurological: Negative.    Psychiatric/Behavioral:  "Negative.     All other systems reviewed and are negative.      12 point review of systems conducted, negative except as stated in the history of present illness. See HPI for details.    Objective:     Visit Vitals  /74 (BP Location: Right forearm, Patient Position: Sitting)   Pulse 83   Temp 97.3 °F (36.3 °C) (Oral)   Resp 19   Ht 5' 5" (1.651 m)   Wt 125.1 kg (275 lb 12.8 oz)   LMP  (LMP Unknown)   SpO2 96%   BMI 45.90 kg/m²       Physical Exam  Vitals reviewed.   Constitutional:       Appearance: Normal appearance.   HENT:      Head: Normocephalic.      Mouth/Throat:      Mouth: Mucous membranes are moist.   Eyes:      Conjunctiva/sclera: Conjunctivae normal.      Pupils: Pupils are equal, round, and reactive to light.   Cardiovascular:      Rate and Rhythm: Normal rate and regular rhythm.   Pulmonary:      Effort: Pulmonary effort is normal. No respiratory distress.      Breath sounds: Normal breath sounds.   Abdominal:      General: Bowel sounds are normal. There is no distension.      Palpations: Abdomen is soft.   Musculoskeletal:         General: Normal range of motion.      Cervical back: Normal range of motion and neck supple.      Right knee: Crepitus present. Normal range of motion.      Left knee: Crepitus present. Normal range of motion.   Skin:     General: Skin is warm and dry.   Neurological:      Mental Status: She is alert and oriented to person, place, and time.   Psychiatric:         Mood and Affect: Mood normal.         Assessment:       ICD-10-CM ICD-9-CM   1. Wellness examination  Z00.00 V70.0   2. Peripheral polyneuropathy  G62.9 356.9   3. Lumbar spondylosis  M47.816 721.3   4. Depressive disorder  F32.A 311   5. Chronic obstructive pulmonary disease, unspecified COPD type  J44.9 496   6. Seasonal allergies  J30.2 477.9   7. Glaucoma of both eyes, unspecified glaucoma type  H40.9 365.9   8. Nonintractable epilepsy without status epilepticus, unspecified epilepsy type  G40.909 345.90 "   9. Neuropathy  G62.9 355.9   10. Moderate persistent asthma without complication  J45.40 493.90   11. Primary hypertension  I10 401.9   12. Overactive bladder  N32.81 596.51   13. Stage 3a chronic kidney disease  N18.31 585.3   14. Postmenopausal  Z78.0 V49.81   15. Class 3 severe obesity due to excess calories with serious comorbidity and body mass index (BMI) of 45.0 to 49.9 in adult  E66.813 278.01    Z68.42 V85.42    E66.01    16. Gastroesophageal reflux disease without esophagitis  K21.9 530.81   17. Advanced care planning/counseling discussion  Z71.89 V65.49   18. KHANH on CPAP  G47.33 327.23   19. Bloody stool  K92.1 578.1   20. Encounter for screening mammogram for malignant neoplasm of breast  Z12.31 V76.12   21. Need for vaccination against Streptococcus pneumoniae  Z23 V03.82   22. Need for pneumococcal vaccination  Z23 V03.82   23. Acute pain of both knees  M25.561 338.19    M25.562 719.46   24. Abnormal finding of blood chemistry, unspecified  R79.9 790.6        Plan:     1. Wellness examination  Assessment & Plan:  Labs due and ordered  MMG due and ordered  Prevnar due; given in office today    Orders:  -     Lipid Panel; Future; Expected date: 12/24/2024  -     TSH; Future; Expected date: 12/24/2024  -     Hemoglobin A1C; Future; Expected date: 12/24/2024    2. Peripheral polyneuropathy  Assessment & Plan:  Stable  Keep appt with PCP for follow up        3. Lumbar spondylosis  Assessment & Plan:  Stable  Pt would like referral to chiropractor; referral placed  Keep appt with PCP for follow up      Orders:  -     Ambulatory referral/consult to Chiropractic; Future; Expected date: 12/31/2024    4. Depressive disorder  Assessment & Plan:  Stable  Continue Effexor  Keep appt with PCP for follow up        5. Chronic obstructive pulmonary disease, unspecified COPD type  Assessment & Plan:  Stable  Continue inhalers as prescribed  Keep appt with pulmonology for follow up         6. Seasonal  "allergies  Assessment & Plan:  Stable  Continue Xyzal as prescribed  Keep appt with PCP for follow up        7. Glaucoma of both eyes, unspecified glaucoma type  Assessment & Plan:  Stable  Continue Latanoprost ophthalmic solution  Keep appt with Ophthalmology  for follow up        8. Nonintractable epilepsy without status epilepticus, unspecified epilepsy type  Overview:  Has had seizures since the age of 13. Initial seizure was in the setting of acute illness with hydronephrosis and continued to have seizures frequently. Used to stare and throw dolls at her sister and did not know they were seizures.  SHe used to have 30-40 seizures a month. Currently seizure frequency is about 1 every 6 months. She is on vimpat 200 BID, topamax 200 BID. She also takes clorazepate, clonazepam 1 BID. She has 2 types of seizures. First type is characterized by starting with "lord sola help me" and she makes sign of cross, then stares, hits her leg, falls to ground if standing lasting for 1-2 minutes. has postictal confusion for 5 minutes to 15 minutes. Has never had a generalized tonic clonic seizure witnessed. Second type is nocturnal. She has had a seizure in her sleep and woke up with tongue bitten. Overall she is very happy with her seizure frequency currently as it is much improved. She does complain of short-term memory problems, which she attributes to the medications.      Assessment & Plan:  Stable  Continue current meds (Topamax and Vimpat)  Keep appt with Neurology for follow up        9. Neuropathy  Assessment & Plan:  Stable  Continue Gabapentin as prescribed  Keep appt with PCP for follow up        10. Moderate persistent asthma without complication  Assessment & Plan:  Stable  Continue inhalers as prescribed  Keep appt with pulmonology for follow up        11. Primary hypertension  Assessment & Plan:  BP at goal  Continue current medication (Lisinopril and Norvasc)  Daily BP check; bring log all clinic " visits  Instructed to report to ED for any BP greater than 200/100, dizziness, syncope, CP, or SOB  Keep appt. With PCP for follow up  Patient is agreeable to plan and verbalizes understanding     Orders:  -     Lipid Panel; Future; Expected date: 12/24/2024  -     TSH; Future; Expected date: 12/24/2024  -     Hemoglobin A1C; Future; Expected date: 12/24/2024    12. Overactive bladder  Assessment & Plan:  Stable On  Myrbetriq.  Keep appts with urology      13. Stage 3a chronic kidney disease  Assessment & Plan:  Stable  Keep appt with Nephrology for follow up      Orders:  -     Lipid Panel; Future; Expected date: 12/24/2024  -     TSH; Future; Expected date: 12/24/2024  -     Hemoglobin A1C; Future; Expected date: 12/24/2024    14. Postmenopausal  Assessment & Plan:  DEXA up to date      15. Class 3 severe obesity due to excess calories with serious comorbidity and body mass index (BMI) of 45.0 to 49.9 in adult  Assessment & Plan:  Healthy eating habits discussed   Exercise as tolerated      16. Gastroesophageal reflux disease without esophagitis  Assessment & Plan:  Stable  Continue PPI  Keep appt with PCP for follow up        17. Advanced care planning/counseling discussion  Assessment & Plan:  Advanced Care Planning:  I attest that I have had a face-to-face discussion with patient       18. KHANH on CPAP  Assessment & Plan:  Stable  C-PAP???  Keep appt with PCP for follow up        19. Bloody stool  Assessment & Plan:  States occasional bloody stools; Increased GERD; nausea  Desires referral to GI  States will contact me when she switches insurance for GI referral  Instructed to continue to monitor symptoms  Report to ED for any syncope,dizziness, weakness, CP, SOB, and/or worsening symptoms  Pt is agreeable to plan and verbalizes understanding       20. Encounter for screening mammogram for malignant neoplasm of breast  -     Mammo Digital Screening Bilat w/ [place]; Future; Expected date: 01/24/2025    21. Need for  vaccination against Streptococcus pneumoniae  -     Discontinue: (VFC) PCV20 (Prevnar 20) IM vaccine (>/= 6 wks)    22. Need for pneumococcal vaccination  -     Pneumococcal conjugate vaccine 20 (PCV20) *Preferred* (PREVNAR 20) - (IM)    23. Acute pain of both knees  Assessment & Plan:  Referral placed to Ortho for evaluation    Orders:  -     Ambulatory referral/consult to Orthopedics; Future; Expected date: 12/31/2024    24. Abnormal finding of blood chemistry, unspecified  -     Hemoglobin A1C; Future; Expected date: 12/24/2024         A comprehensive HEALTH RISK ASSESSMENT was completed today. Results are summarized below:    There are NO EMOTIONAL/SOCIAL CONCERNS identified on today's screening for Social Isolation, Depression and Anxiety.    There are NO COGNITIVE FUNCTION CONCERNS identified on today's screening.  The following FUNCTIONAL AND/OR SAFETY CONCERNS were identified on today's screening for Physical Symptoms, Nutritional, Home Safety/Living Situation, Fall Risk, Activities of Daily Living, Independent Activities of Daily Living, Physical Activity,Timed Up and Go test and Whisper test::  *Patient reports NO ROUTINE EXERCISE. (On average, how many days per week do you engage in moderate to strenuous exercise (like a brisk walk)?: (!) 0)      The patient reports NO OPIOID PRESCRIPTIONS. This was confirmed through medication reconciliation and the [facility] website.    The patient is NOT A TOBACCO USER.        All Questions regarding food, transportation or housing were not answered today.    The patient was asked and declined the use of a free .        Provided patient with a 5-10 year written screening schedule and personal prevention plan. Recommendations were developed using the USPSTF age appropriate recommendations. Education, counseling, and referrals were provided as needed. After Visit Summary printed and given to patient, which includes a list of additional screenings\tests  needed.    Follow up in about 6 months (around 6/24/2025) for F/U with PCP. In addition to their scheduled follow up, the patient has also been instructed to follow up on as needed basis.     Future Appointments   Date Time Provider Department Center   5/28/2025 11:00 AM Indira Worley MD [facility] [address] [place]   6/18/2025 10:45 AM Jd Carolina MD [facility] [place]   6/24/2025  1:30 PM [facility]       Advance Care Planning   Today we discussed advance care planning. She is interested in learning more about how to make Advance Directives. Information was provided and I offered to discuss more at her discretion.

## 2024-12-24 NOTE — ASSESSMENT & PLAN NOTE
BP at goal  Continue current medication (Lisinopril and Norvasc)  Daily BP check; bring log all clinic visits  Instructed to report to ED for any BP greater than 200/100, dizziness, syncope, CP, or SOB  Keep appt. With PCP for follow up  Patient is agreeable to plan and verbalizes understanding

## 2024-12-24 NOTE — PATIENT INSTRUCTIONS
Patient Education       Weight Loss Tips   About this topic   More and more people are concerned about their weight. You can choose from many different programs. The goal of a weight loss program may be to cut down on calories or to lose extra weight through exercise.  Losing weight may mean changing your ideas about food. Going on a diet and losing weight does not mean starving yourself. It means cutting down on the amount of food you eat, making healthy food choices, and being active.  General   Ideally, you need to lose 1 to 2 pounds (0.5 to 1 kg) a week for a healthy weight loss. Losing too much weight too fast is not good. When you take in fewer calories, you will lose weight. Your ideal calorie and weight goal depends on your current age, weight, height, and personal goals. Ask your doctor or dietitian what your ideal weight is.  You need to burn 3500 calories to lose 1 pound (0.5 kg). That means cutting out 500 calories every day for 7 days. You can cut out 500 calories per day by eating or drinking fewer calories, burning them through exercise, or doing both. To lose that extra weight and stay healthy:  Take time to exercise.  Exercise regularly. Burn calories with activity and exercise. Exercise can help you lose weight and it also strengthens your muscles. Set a schedule where you will have time to do exercises. With just 30 to 60 minutes of exercise each day, you could burn 500 extra calories. Your metabolism stays elevated for a period of time after exercise.  If you don't have time for a 30 minute workout, try three 10 minute exercises each day.  If you work near your home, walk to work. Walking is a very good form of exercise.  Take a 20 minute walk each day. Walk during your lunch break. Park far away, so you have to walk more.  Take the steps instead of elevators. You will burn more calories this way.  If you have an illness, like diabetes or high blood pressure, ask your doctor how much exercise is  "right for you.  Choose healthy snacks.  Low calorie healthy snacks are a good thing. They help your blood sugar stay even and prevent you from overeating at meals. Choose a balanced snack, such as a small apple with 2 tablespoons (30 grams) of peanut butter.  Keep in mind, even "low calorie" foods can add up. Just because you choose low calorie foods does not mean you do not have to count the calories you eat.  Pack a few fresh fruits or a small salad to take to work or school. Avoid buying a snack at the nearest vending machine.  When you feel thirsty, drink water. Water has no calories and is a very good thirst quencher.  Plan healthy meals.  Plan ahead. Keep a diary of foods that are low in calories. You can also make a list of meal plans for your breakfast, lunch, and dinner. Planning ahead will prevent you from eating out at a fast food place or restaurant.  Make a grocery list before shopping so you only buy food you need. Don't go to the store hungry.  Visit a dietitian. This person will help you make meal plans that will help you lose weight.  Add fiber to your meals. Adding fiber helps you to feel full for a longer amount of time.  Take care when eating out.  Choose lower fat and lower calorie meals. Try a seafood, lean meat, or vegetarian entrée.  Share a meal with a friend.  Try a salad and appetizer instead of an entree.  Ask for a to-go box when dinner is served and put half of your meal in it for a later meal.  Have fruit for dessert.  Drink water instead of other high calorie drinks.  Learn not to overeat.  Watch your portions. For example, the recommended serving size of meat is 3 ounces (90 grams). This is the size of a deck of playing cards. Two tablespoons (30 grams) of peanut butter is the size of a ping-pong ball. One medium fruit is the size of a baseball.  Use a smaller plate or glass during dinner for less calorie intake.  Try drinking a glass or two of water before eating. This may make you " feel more full and help you to eat less.  Eat slowly. Take at least 30 minutes to eat. This gives time for your brain to tell your stomach you are full. This will help you avoid overeating.  Some people eat smaller meals more often to help not to overeat. If you can eat six small meals, make them healthy and low calorie. If three meals are best for you, know your calorie level for the day and spread it out into three healthy low calorie meals.     What will the results be?   Losing weight may make you healthier. You also may have more energy for your daily activities. You may lower disease risk. You may also add years to your life.  What changes to diet are needed?   Learn how to read nutrition labels. Know the serving size. Knowing the calories in an item will help you make healthier choices and lose weight.  Keep a diary of the food you eat. This will help you count the calories you are taking in.  Make a menu in advance. This will help you make good choices to include in your diet.  Avoid eating 2 hours before bedtime to allow for digestion. If you eat right before you go to bed, you may also have worse heartburn.  Be sure to count the calories in the things you drink. You may want to stop drinking soda pop, beer, wine, and mixed drinks (alcohol). Some coffee drinks also have a lot of calories in them.  Who should use this diet?   A weight loss diet may be needed for people with a calculated body mass index of 25 and over. This means you are overweight or obese.  Who should not use this diet?   People with BMI of 18.5 or lower should not use this diet. Do not use this diet if your doctor does not recommend weight loss.  What foods are good to eat?   Choose foods that are nutritious. Remember, portion control is key. Even a low calorie food can become high in calories if you have too big of a serving. Here is a list of foods that are good to eat:  Vegetables:   Broccoli  Asparagus  Spinach  Green leafy  vegetables  Tomatoes  Onions  Mushrooms  Cucumbers  Zucchini  Lean proteins:   Egg whites  Beans including kidney, navy, black, and chickpeas  Grilled, broiled, or baked skinless chicken breast  Grilled, broiled, or baked skinless turkey breast  Lean beef  [place] meat  Grilled, broiled, or baked fish  Beans  Nuts, such as almonds, cashews, and pistachios  Seeds  Whole grains and carbs:   Oatmeal  Brown rice  Sweet potatoes  Cereal  Whole grain bread or pasta  Fruits:   Apples  Grapefruit  Blueberries  Oranges  Bananas  Grapes  Peaches  Pineapple  Strawberries  Dairy:   Fat-free or low-fat milk and cheese  Low fat yogurt  Soy, rice, or almond milk  What foods should be limited or avoided?   Limit or avoid foods that are high in calories like:  Junk foods  Fried foods  Fatty foods  Processed meats  Food with saturated and trans fat  Whole fat dairy products  Butter  Cheese  Ice cream  Food and drinks with a lot of sugar. Some examples are beer, wine, mixed drinks (alcohol), carbonated sodas, cakes, and cookies.  When do I need to call the doctor?   Weakness  Fast heartbeat  Dizziness  Helpful tips   Join a support group and an exercise group. It is much easier to lose weight if you have support and encouragement.  Do not skip meals. If you skip a meal, you most likely will overeat at that next meal.  Eat at the dining room table instead in front of the TV to help monitor intake.  Where can I learn more?   Academy of Nutrition and Dietetics  https://www.eatright.org/health/weight-loss/your-health-and-your-weight/back-to-basics-for-healthy-weight-loss   Centers for Disease Control and Prevention  https://www.cdc.gov/healthyweight/   Weight-Control Information Network  https://www.niddk.nih.gov/health-information/diet-nutrition/changing-habits-better-health   Last Reviewed Date   2021-08-09  Consumer Information Use and Disclaimer   This information is not specific medical advice and does not replace information you  receive from your health care provider. This is only a brief summary of general information. It does NOT include all information about conditions, illnesses, injuries, tests, procedures, treatments, therapies, discharge instructions or life-style choices that may apply to you. You must talk with your health care provider for complete information about your health and treatment options. This information should not be used to decide whether or not to accept your health care providers advice, instructions or recommendations. Only your health care provider has the knowledge and training to provide advice that is right for you.  Copyright   Copyright © 2021 "[employer] Inc. and its affiliates and/or licensors. All rights reserved.

## 2025-01-03 ENCOUNTER — LAB VISIT (OUTPATIENT)
Dept: LAB | Facility: HOSPITAL | Age: 65
End: 2025-01-03
Attending: NURSE PRACTITIONER
Payer: MEDICARE

## 2025-01-03 DIAGNOSIS — I10 PRIMARY HYPERTENSION: ICD-10-CM

## 2025-01-03 DIAGNOSIS — Z00.00 WELLNESS EXAMINATION: ICD-10-CM

## 2025-01-03 DIAGNOSIS — N18.31 STAGE 3A CHRONIC KIDNEY DISEASE: ICD-10-CM

## 2025-01-03 DIAGNOSIS — R79.9 ABNORMAL FINDING OF BLOOD CHEMISTRY, UNSPECIFIED: ICD-10-CM

## 2025-01-03 LAB
CHOLEST SERPL-MCNC: 206 MG/DL
CHOLEST/HDLC SERPL: 4 {RATIO} (ref 0–5)
EST. AVERAGE GLUCOSE BLD GHB EST-MCNC: 122.6 MG/DL
HBA1C MFR BLD: 5.9 %
HDLC SERPL-MCNC: 48 MG/DL (ref 35–60)
LDLC SERPL CALC-MCNC: 136 MG/DL (ref 50–140)
TRIGL SERPL-MCNC: 109 MG/DL (ref 37–140)
TSH SERPL-ACNC: 3.31 UIU/ML (ref 0.35–4.94)
VLDLC SERPL CALC-MCNC: 22 MG/DL

## 2025-01-03 PROCEDURE — 84443 ASSAY THYROID STIM HORMONE: CPT

## 2025-01-03 PROCEDURE — 36415 COLL VENOUS BLD VENIPUNCTURE: CPT

## 2025-01-03 PROCEDURE — 80061 LIPID PANEL: CPT

## 2025-01-03 PROCEDURE — 83036 HEMOGLOBIN GLYCOSYLATED A1C: CPT

## 2025-01-09 ENCOUNTER — TELEPHONE (OUTPATIENT)
Dept: FAMILY MEDICINE | Facility: CLINIC | Age: 65
End: 2025-01-09
Payer: MEDICARE

## 2025-01-09 NOTE — TELEPHONE ENCOUNTER
----- Message from Miriam sent at 1/9/2025 10:50 AM CST -----  Who Called: Christie Marcum    Caller is requesting to schedule their annual mammogram appointment. Order is not listed in Epic. Please enter order and contact patient to schedule.    Where would they [facility] the [facility]ogram [facility]    Preferred Method of Contact: Phone Call  Patient's Preferred Phone Number on File: [phone]   Best Call Back Number, if different:  Additional Information:  requesting order  
I spoke with patient and mammogram order sent to [facility]  
[Negative] : Heme/Lymph

## 2025-01-16 ENCOUNTER — NURSE TRIAGE (OUTPATIENT)
Dept: ADMINISTRATIVE | Facility: CLINIC | Age: 65
End: 2025-01-16
Payer: MEDICARE

## 2025-01-16 ENCOUNTER — TELEPHONE (OUTPATIENT)
Dept: NEUROLOGY | Facility: CLINIC | Age: 65
End: 2025-01-16
Payer: MEDICARE

## 2025-01-16 ENCOUNTER — OCHSNER VIRTUAL EMERGENCY DEPARTMENT (OUTPATIENT)
Facility: CLINIC | Age: 65
End: 2025-01-16
Payer: MEDICARE

## 2025-01-16 NOTE — TELEPHONE ENCOUNTER
Pt reports  being light-headed/dizzy and nauseated. Was increased to 10 mg (from 5mg) of her amlodipine about a week or two ago and feels that is what is causing the dizziness as her symptoms started shortly after starting the medication, Pt states her BP was 172/80 an hour ago and she takes her amlodipine in the evenings now, but denies that she feels worse after taking the medication or having a significant drop in her BP after taking it. Does take 40mg of Lisinopril daily as well, but has been on that amount for a long time. Pt states she does have to hold on to something when she gets up and walks due to the dizziness. Pt advised to go to the ED now (or PCP triage) per protocol, was referred to [facility]. Dr. Dash advised for pt to be seen in the ED to rule out CVA. Pt informed and encouraged to call back with any worsening symptoms or questions. She verbalized understanding and plans to go to the ED.    Reason for Disposition   SEVERE dizziness (e.g., unable to stand, requires support to walk, feels like passing out now)    Additional Information   Negative: SEVERE difficulty breathing (e.g., struggling for each breath, speaks in single words)   Negative: [1] Difficulty breathing or swallowing AND [2] started suddenly after medicine, an allergic food or bee sting   Negative: Shock suspected (e.g., cold/pale/clammy skin, too weak to stand, low BP, rapid pulse)   Negative: Difficult to awaken or acting confused (e.g., disoriented, slurred speech)   Negative: [1] Weakness (i.e., paralysis, loss of muscle strength) of the face, arm or leg on one side of the body AND [2] sudden onset AND [3] present now   Negative: [1] Numbness (i.e., loss of sensation) of the face, arm or leg on one side of the body AND [2] sudden onset AND [3] present now   Negative: [1] Loss of speech or garbled speech AND [2] sudden onset AND [3] present now   Negative: Overdose (accidental or intentional) of medications   Negative: [1] Fainted >  15 minutes ago AND [2] still feels too weak or dizzy to stand   Negative: Heart beating < 50 beats per minute OR > 140 beats per minute   Negative: Sounds like a life-threatening emergency to the triager   Negative: Difficulty breathing    Protocols used: Dizziness - [id number]

## 2025-01-16 NOTE — TELEPHONE ENCOUNTER
For the past 3 days has been lightheaded, dizzy, and nauseated. She was wondering if the gabapentin 300 mg could be causing these symptoms. Please advise. [phone]

## 2025-01-16 NOTE — PLAN OF CARE-OVED
[facility] Emergency Department Plan of Care Note    Referral source: Nurse On-Call      Reason for consult: Dizziness      Additional History: pt that is having dizziness and nausea for about 2 weeks now, which was around the time that her amlodipine was increased to 10mg every morning from 5mg every morning. Pt feels like the medication is causing it, but states she is constantly feeling this way, not just after taking the med in the morning. Current BP is 172/80. Pt denies any significant drop in her BP after taking the med. Pt having to hold on to something just to stand up.  Have co-morbidities.        Disposition recommended: Emergency Department      Additional Recommendations: no

## 2025-01-17 ENCOUNTER — TELEPHONE (OUTPATIENT)
Facility: CLINIC | Age: 65
End: 2025-01-17
Payer: MEDICARE

## 2025-01-17 NOTE — TELEPHONE ENCOUNTER
"Patient spoke with [facility] RN on 1/16/2025 with complaint of: "Having dizziness and nausea for about 2 weeks now, which was around the time that her amlodipine was increased to 10mg every morning from 5mg every morning. Pt feels like the medication is causing it, but states she is constantly feeling this way, not just after taking the med in the morning. Current BP is 172/80. Pt denies any significant drop in her BP after taking the med. Protocol advised for pt to go to the ED now (or PCP triage). Pt wanted to reach out to [facility] to see if going to the ED is necessary. Pt can not do an ODVV and the next available appt with her PCP office isn't until the 22nd of January."    [facility] RN consulted with [facility] provider, Dr. Dash, and disposition recommended was: emergency department.  No emergency room encounter noted.    Patient outreached to see if patient received the care she was needing, but unable to reach.  Left voicemail for a call return.  "

## 2025-01-17 NOTE — TELEPHONE ENCOUNTER
Phone call placed to pt as well as alternate phone number. No answer.   Left VM requesting pt return call to clinic. Waiting on call back.

## 2025-01-28 ENCOUNTER — TELEPHONE (OUTPATIENT)
Dept: NEUROLOGY | Facility: CLINIC | Age: 65
End: 2025-01-28
Payer: MEDICARE

## 2025-01-28 NOTE — TELEPHONE ENCOUNTER
----- Message from Med Assistant Cheung sent at 1/27/2025  6:46 AM CST -----  Regarding: FW: Missed call  Contact: [phone]  [facility]  ----- Message -----  From: Jabier Rinaldi  Sent: 1/13/2025  12:27 PM CST  To: [facility] Neuro Clinical Support  Subject: Missed call                                      Caller is returning a missed called from Denisha Horton RN  in the office. She said tomorrow she won't be able to talk because she is booked with appointments. Please call back as soon as possible.

## 2025-02-05 ENCOUNTER — OFFICE VISIT (OUTPATIENT)
Dept: FAMILY MEDICINE | Facility: CLINIC | Age: 65
End: 2025-02-05
Payer: MEDICARE

## 2025-02-05 VITALS
OXYGEN SATURATION: 97 % | SYSTOLIC BLOOD PRESSURE: 136 MMHG | HEART RATE: 84 BPM | HEIGHT: 65 IN | RESPIRATION RATE: 16 BRPM | BODY MASS INDEX: 45.65 KG/M2 | TEMPERATURE: 98 F | WEIGHT: 274 LBS | DIASTOLIC BLOOD PRESSURE: 76 MMHG

## 2025-02-05 DIAGNOSIS — G40.909 NONINTRACTABLE EPILEPSY WITHOUT STATUS EPILEPTICUS, UNSPECIFIED EPILEPSY TYPE: ICD-10-CM

## 2025-02-05 DIAGNOSIS — M47.816 LUMBAR SPONDYLOSIS: Primary | ICD-10-CM

## 2025-02-05 DIAGNOSIS — M51.369 DEGENERATION OF INTERVERTEBRAL DISC OF LUMBAR REGION, UNSPECIFIED WHETHER PAIN PRESENT: ICD-10-CM

## 2025-02-05 PROCEDURE — 1160F RVW MEDS BY RX/DR IN RCRD: CPT | Mod: CPTII,,, | Performed by: NURSE PRACTITIONER

## 2025-02-05 PROCEDURE — 1159F MED LIST DOCD IN RCRD: CPT | Mod: CPTII,,, | Performed by: NURSE PRACTITIONER

## 2025-02-05 PROCEDURE — 3075F SYST BP GE 130 - 139MM HG: CPT | Mod: CPTII,,, | Performed by: NURSE PRACTITIONER

## 2025-02-05 PROCEDURE — 99214 OFFICE O/P EST MOD 30 MIN: CPT | Mod: ,,, | Performed by: NURSE PRACTITIONER

## 2025-02-05 PROCEDURE — 3078F DIAST BP <80 MM HG: CPT | Mod: CPTII,,, | Performed by: NURSE PRACTITIONER

## 2025-02-05 PROCEDURE — 3008F BODY MASS INDEX DOCD: CPT | Mod: CPTII,,, | Performed by: NURSE PRACTITIONER

## 2025-02-05 PROCEDURE — 3044F HG A1C LEVEL LT 7.0%: CPT | Mod: CPTII,,, | Performed by: NURSE PRACTITIONER

## 2025-02-05 NOTE — PROGRESS NOTES
Subjective:      Patient ID: Christie Marcum is a 64 y.o. Black or  female      Chief Complaint: Follow-up (Paperwork)       Past Medical History:   Diagnosis Date    Anxiety     Arthritis     Asthma     Cancer of splenic flexure     CKD (chronic kidney disease)     COPD (chronic obstructive pulmonary disease) 07/05/2022    Decreased hearing of left ear 12/07/2022    Depressive disorder 07/05/2022    Epilepsy 07/05/2022    Has had seizures since the age of 13. Initial seizure was in the setting of acute illness with hydronephrosis and continued to have seizures frequently. Used to stare and throw dolls at her sister and did not know they were seizures.  SHe used to have 30-40 seizures a month. Currently seizure frequency is about 1 every 6 months. She is on vimpat 200 BID, topamax 200 BID. She also takes clorazepate    Gastroesophageal reflux disease 07/05/2022    Glaucoma 07/05/2022    Hypertension     IGT (impaired glucose tolerance)     Low back pain 09/21/2022    Moderate persistent asthma without complication 08/25/2022    Neuropathy 07/05/2022    On home oxygen therapy 09/21/2022    KHANH on CPAP     Overactive bladder 07/05/2022    Seasonal allergies 11/02/2022        HPI  Presents to clinic for completion of paperwork for long term personal care.    Seizure disorder:  She is followed by Neurology, Dr. Worley.  Currently on Vimpat and Topamax.      Chronic Back Pain:  Pt has chronic back pain and is unable to stand for prolonged periods of time.  She has difficulties performing ADLs, cooking, cleaning, toileting, and performing basic household duties.  Pt is requesting Rx back brace.      Due to history of seizures and chronic back pain, patient is requesting paperwork for a long time personal care.  Denies any other problems..                Patient Care Team:  Mary Leon MD as PCP - General (Family Medicine)  Yamile Mortensen MD (Pulmonary Disease)  Cornelio Waggoner MD as Consulting  Physician (Gastroenterology)  Indira Worley MD as Consulting Physician (Neurology)  Tahir Ahmadi MD as Consulting Physician (Oncology)  Mitch Flor MD as Consulting Physician (Urology)  Cornelio Waggoner MD as Consulting Physician (Gastroenterology)      Review of Systems   Constitutional: Negative.  Negative for appetite change, chills and fever.   HENT: Negative.     Eyes: Negative.  Negative for discharge and redness.   Respiratory: Negative.  Negative for shortness of breath.    Cardiovascular: Negative.  Negative for chest pain.   Gastrointestinal: Negative.    Endocrine: Negative.    Genitourinary: Negative.    Musculoskeletal:  Positive for back pain.   Integumentary:  Negative.   Allergic/Immunologic: Negative.    Neurological: Negative.    Psychiatric/Behavioral: Negative.     All other systems reviewed and are negative.          Objective:      Vitals:    02/05/25 1434   BP: 136/76   Pulse: 84   Resp: 16   Temp: 97.9 °F (36.6 °C)      Body mass index is 45.6 kg/m².     Physical Exam  Vitals reviewed.   Constitutional:       Appearance: Normal appearance.   HENT:      Head: Normocephalic.      Mouth/Throat:      Mouth: Mucous membranes are moist.   Eyes:      Conjunctiva/sclera: Conjunctivae normal.      Pupils: Pupils are equal, round, and reactive to light.   Cardiovascular:      Rate and Rhythm: Normal rate and regular rhythm.   Pulmonary:      Effort: Pulmonary effort is normal. No respiratory distress.      Breath sounds: Normal breath sounds.   Musculoskeletal:         General: Normal range of motion.      Cervical back: Normal range of motion and neck supple.   Skin:     General: Skin is warm and dry.   Neurological:      Mental Status: She is alert and oriented to person, place, and time.   Psychiatric:         Mood and Affect: Mood normal.            Current Outpatient Medications:     albuterol-ipratropium (DUO-NEB) 2.5 mg-0.5 mg/3 mL nebulizer solution, PLEASE SEE  ATTACHED FOR DETAILED DIRECTIONS, Disp: , Rfl:     amLODIPine (NORVASC) 5 MG tablet, Take 2 tablets (10 mg total) by mouth once daily. (Patient taking differently: Take 5 mg by mouth once daily.), Disp: 180 tablet, Rfl: 3    budesonide (PULMICORT) 0.5 mg/2 mL nebulizer solution, Take 0.5 mg by nebulization daily as needed., Disp: , Rfl:     clonazePAM (KLONOPIN) 1 MG tablet, Take 0.5 tablets (0.5 mg total) by mouth nightly as needed for Anxiety., Disp: 15 tablet, Rfl: 2    gabapentin (NEURONTIN) 300 MG capsule, Take 1 capsule (300 mg total) by mouth every evening., Disp: 30 capsule, Rfl: 11    hydrOXYzine pamoate (VISTARIL) 25 MG Cap, Take 2 capsules (50 mg total) by mouth 2 (two) times daily., Disp: 360 capsule, Rfl: 0    lacosamide (VIMPAT) 200 mg Tab tablet, Take 1 tablet (200 mg total) by mouth every 12 (twelve) hours., Disp: 60 tablet, Rfl: 5    latanoprost 0.005 % ophthalmic solution, Place 1 drop into both eyes nightly., Disp: , Rfl:     levocetirizine (XYZAL) 5 MG tablet, Take 5 mg by mouth every evening., Disp: , Rfl:     lisinopriL (PRINIVIL,ZESTRIL) 40 MG tablet, Take 1 tablet (40 mg total) by mouth once daily., Disp: 90 tablet, Rfl: 3    topiramate (TOPAMAX) 100 MG tablet, Take 1.5 tablets (150 mg total) by mouth 2 (two) times daily., Disp: 270 tablet, Rfl: 3    venlafaxine (EFFEXOR-XR) 37.5 MG 24 hr capsule, Take 1 capsule (37.5 mg total) by mouth once daily. To take with 75mg once daily, Disp: 90 capsule, Rfl: 3    venlafaxine (EFFEXOR-XR) 75 MG 24 hr capsule, Take 1 capsule (75 mg total) by mouth once daily., Disp: 90 capsule, Rfl: 0    miscellaneous medical supply Kit, 1 each by Misc.(Non-Drug; Combo Route) route Daily. ROUND SHOWER STOOL; NEEDED FOR 99 MONTHS, Disp: 1 kit, Rfl: 0    nebulizer and compressor Eli, 1 each by Misc.(Non-Drug; Combo Route) route every 6 (six) hours as needed (wheezing)., Disp: 1 each, Rfl: 0    Assessment & Plan:     Problem List Items Addressed This Visit          Neuro     Epilepsy     Stable  Continue current meds (Topamax and Vimpat) as prescribed  Keep appt with PCP for follow up           Lumbar spondylosis - Primary     Chronic back pain   Patient has difficulties performing ADLs, cooking, cleaning, toileting, and performing basic household duties.  Will complete paperwork for long-term personal care         Relevant Orders    Back/Cervical Brace For Home Use     Other Visit Diagnoses       Degeneration of intervertebral disc of lumbar region, unspecified whether pain present        Relevant Orders    Back/Cervical Brace For Home Use

## 2025-02-07 NOTE — ASSESSMENT & PLAN NOTE
Chronic back pain   Patient has difficulties performing ADLs, cooking, cleaning, toileting, and performing basic household duties.  Will complete paperwork for long-term personal care

## 2025-02-07 NOTE — ASSESSMENT & PLAN NOTE
Stable  Continue current meds (Topamax and Vimpat) as prescribed  Keep appt with PCP for follow up

## 2025-02-14 DIAGNOSIS — I10 PRIMARY HYPERTENSION: ICD-10-CM

## 2025-02-14 RX ORDER — LISINOPRIL 40 MG/1
40 TABLET ORAL DAILY
Qty: 90 TABLET | Refills: 1 | Status: SHIPPED | OUTPATIENT
Start: 2025-02-14 | End: 2026-02-14

## 2025-02-14 NOTE — TELEPHONE ENCOUNTER
----- Message from Byron sent at 2/14/2025  9:20 AM CST -----  Who Called: Christie Marcum    Refill or New Rx:Refill    RX Name and Strength:lisinopriL (PRINIVIL,ZESTRIL) 40 MG tablet  How is the patient currently taking it? (ex. 1XDay):  Is this a 30 day or 90 day RX:  Local or Mail Order:  List of preferred pharmacies on file (remove unneeded): [unfilled]  If different Pharmacy is requested, enter Pharmacy information here including location and phone number: [id number] - [place] [address]   Ordering Provider:        Patient's Preferred Phone Number on File: [phone]   Best Call Back Number, if different:  Additional Information:

## 2025-02-17 ENCOUNTER — TELEPHONE (OUTPATIENT)
Dept: FAMILY MEDICINE | Facility: CLINIC | Age: 65
End: 2025-02-17
Payer: MEDICARE

## 2025-02-17 NOTE — TELEPHONE ENCOUNTER
----- Message from Willard sent at 2/17/2025  9:50 AM CST -----  .Who Called: Christie Hawthorne is requesting assistance/information from provider's office.Symptoms (please be specific): pt wants to know if paperwork was filled out and darin she get a call whn they are ready for  today  How long has patient had these symptoms:  n/aList of preferred pharmacies on file (remove unneeded): [unfilled]If different, enter pharmacy into here including location and phone number: n/aPreferred Method of Contact: Phone CallPatient's Preferred Phone Number on File: [phone] Best Call Back Number, if different:Additional Information:

## 2025-02-17 NOTE — TELEPHONE ENCOUNTER
Returned call to pt. Informed that paperwork is ready for . Will  @ [facility].

## 2025-02-20 DIAGNOSIS — G40.209 PARTIAL SYMPTOMATIC EPILEPSY WITH COMPLEX PARTIAL SEIZURES, NOT INTRACTABLE, WITHOUT STATUS EPILEPTICUS: ICD-10-CM

## 2025-02-20 RX ORDER — CLONAZEPAM 1 MG/1
0.5 TABLET ORAL NIGHTLY PRN
Qty: 15 TABLET | Refills: 2 | Status: SHIPPED | OUTPATIENT
Start: 2025-02-20

## 2025-02-20 NOTE — TELEPHONE ENCOUNTER
Requesting CLONAZEPAM 1 mg. Last office visit was 11/12/2024 and last fill on medication was 11/21/2024. Please advise.

## 2025-03-28 ENCOUNTER — TELEPHONE (OUTPATIENT)
Dept: NEUROLOGY | Facility: CLINIC | Age: 65
End: 2025-03-28
Payer: MEDICARE

## 2025-03-28 DIAGNOSIS — G40.909 NONINTRACTABLE EPILEPSY WITHOUT STATUS EPILEPTICUS, UNSPECIFIED EPILEPSY TYPE: Primary | ICD-10-CM

## 2025-03-28 NOTE — TELEPHONE ENCOUNTER
EMU Scheduled 5/14/25, 11am. Aware an adult is required to accompany her for the duration including overnight. Aware she will be connected to lines to her head, chest, arm, have an IV placed; will not be able to leave the room until all equipment is removed at discharge. Instructions thoroughly discussed; mailed via [employer]. She reports she does use a nebulizer at home 3 x day for her asthma. She has been instructed to bring all of her medications including her nebulizer treatment medication- we will have the nebulizer here in the hospital so she does not need to bring the machine, but should bring the medication in case we do not have it as a formulary med in our pharmacy. V/U.

## 2025-04-21 RX ORDER — VENLAFAXINE HYDROCHLORIDE 75 MG/1
75 CAPSULE, EXTENDED RELEASE ORAL DAILY
Qty: 90 CAPSULE | Refills: 0 | Status: SHIPPED | OUTPATIENT
Start: 2025-04-21

## 2025-04-21 NOTE — TELEPHONE ENCOUNTER
Copied from CRM #[id number]. Topic: General Inquiry - Information Request  >> Apr 21, 2025  9:57 AM Byron wrote:  Who Called: Christie Marcum    Refill or New Rx:Refill    RX Name and Strength:venlafaxine (EFFEXOR-XR) 75 MG 24 hr capsule  How is the patient currently taking it? (ex. 1XDay):  Is this a 30 day or 90 day RX:  Local or Mail Order:  List of preferred pharmacies on file (remove unneeded): [unfilled]  If different Pharmacy is requested, enter Pharmacy information here including location and phone number: [id number] - [place], [place] - [id number]       Ordering Provider:  Patient's Preferred Phone Number on File: [phone]   Best Call Back Number, if different:  Additional Information:

## 2025-04-29 ENCOUNTER — TELEPHONE (OUTPATIENT)
Dept: NEUROLOGY | Facility: CLINIC | Age: 65
End: 2025-04-29
Payer: MEDICARE

## 2025-05-14 ENCOUNTER — HOSPITAL ENCOUNTER (INPATIENT)
Facility: HOSPITAL | Age: 65
LOS: 3 days | Discharge: HOME | DRG: 101 | End: 2025-05-17
Attending: PSYCHIATRY & NEUROLOGY | Admitting: PSYCHIATRY & NEUROLOGY
Payer: MEDICARE

## 2025-05-14 DIAGNOSIS — F32.A DEPRESSIVE DISORDER: ICD-10-CM

## 2025-05-14 DIAGNOSIS — R56.9 SEIZURE: ICD-10-CM

## 2025-05-14 DIAGNOSIS — G40.109 FOCAL EPILEPSY: ICD-10-CM

## 2025-05-14 DIAGNOSIS — G47.33 OSA (OBSTRUCTIVE SLEEP APNEA): Primary | ICD-10-CM

## 2025-05-14 DIAGNOSIS — G40.909 NONINTRACTABLE EPILEPSY WITHOUT STATUS EPILEPTICUS, UNSPECIFIED EPILEPSY TYPE: ICD-10-CM

## 2025-05-14 DIAGNOSIS — F41.9 ANXIETY: ICD-10-CM

## 2025-05-14 LAB
ABSOLUTE EOSINOPHIL (OHS): 0.42 K/UL
ABSOLUTE MONOCYTE (OHS): 0.72 K/UL (ref 0.3–1)
ABSOLUTE NEUTROPHIL COUNT (OHS): 3.81 K/UL (ref 1.8–7.7)
ALBUMIN SERPL BCP-MCNC: 3.2 G/DL (ref 3.5–5.2)
ALP SERPL-CCNC: 67 UNIT/L (ref 40–150)
ALT SERPL W/O P-5'-P-CCNC: 19 UNIT/L (ref 10–44)
AMPHET UR QL SCN: NEGATIVE
ANION GAP (OHS): 8 MMOL/L (ref 8–16)
AST SERPL-CCNC: 17 UNIT/L (ref 11–45)
BARBITURATE SCN PRESENT UR: NEGATIVE
BASOPHILS # BLD AUTO: 0.04 K/UL
BASOPHILS NFR BLD AUTO: 0.6 %
BENZODIAZ UR QL SCN: NEGATIVE
BILIRUB SERPL-MCNC: 0.4 MG/DL (ref 0.1–1)
BUN SERPL-MCNC: 18 MG/DL (ref 8–23)
CALCIUM SERPL-MCNC: 9.4 MG/DL (ref 8.7–10.5)
CANNABINOIDS UR QL SCN: NEGATIVE
CHLORIDE SERPL-SCNC: 110 MMOL/L (ref 95–110)
CO2 SERPL-SCNC: 25 MMOL/L (ref 23–29)
COCAINE UR QL SCN: NEGATIVE
CREAT SERPL-MCNC: 1.3 MG/DL (ref 0.5–1.4)
CREAT UR-MCNC: 85 MG/DL (ref 15–325)
ERYTHROCYTE [DISTWIDTH] IN BLOOD BY AUTOMATED COUNT: 12.8 % (ref 11.5–14.5)
GFR SERPLBLD CREATININE-BSD FMLA CKD-EPI: 46 ML/MIN/1.73/M2
GLUCOSE SERPL-MCNC: 127 MG/DL (ref 70–110)
HCT VFR BLD AUTO: 40.3 % (ref 37–48.5)
HGB BLD-MCNC: 12.3 GM/DL (ref 12–16)
IMM GRANULOCYTES # BLD AUTO: 0.02 K/UL (ref 0–0.04)
IMM GRANULOCYTES NFR BLD AUTO: 0.3 % (ref 0–0.5)
LYMPHOCYTES # BLD AUTO: 1.59 K/UL (ref 1–4.8)
MCH RBC QN AUTO: 27.7 PG (ref 27–31)
MCHC RBC AUTO-ENTMCNC: 30.5 G/DL (ref 32–36)
MCV RBC AUTO: 91 FL (ref 82–98)
METHADONE UR QL SCN: NEGATIVE
NUCLEATED RBC (/100WBC) (OHS): 0 /100 WBC
OPIATES UR QL SCN: NEGATIVE
PCP UR QL: NEGATIVE
PLATELET # BLD AUTO: 222 K/UL (ref 150–450)
PMV BLD AUTO: 9.2 FL (ref 9.2–12.9)
POTASSIUM SERPL-SCNC: 3.7 MMOL/L (ref 3.5–5.1)
PROT SERPL-MCNC: 7.5 GM/DL (ref 6–8.4)
RBC # BLD AUTO: 4.44 M/UL (ref 4–5.4)
RELATIVE EOSINOPHIL (OHS): 6.4 %
RELATIVE LYMPHOCYTE (OHS): 24.1 % (ref 18–48)
RELATIVE MONOCYTE (OHS): 10.9 % (ref 4–15)
RELATIVE NEUTROPHIL (OHS): 57.7 % (ref 38–73)
SODIUM SERPL-SCNC: 143 MMOL/L (ref 136–145)
WBC # BLD AUTO: 6.6 K/UL (ref 3.9–12.7)

## 2025-05-14 PROCEDURE — 95714 VEEG EA 12-26 HR UNMNTR: CPT

## 2025-05-14 PROCEDURE — 85025 COMPLETE CBC W/AUTO DIFF WBC: CPT

## 2025-05-14 PROCEDURE — 25000242 PHARM REV CODE 250 ALT 637 W/ HCPCS

## 2025-05-14 PROCEDURE — 80299 QUANTITATIVE ASSAY DRUG: CPT

## 2025-05-14 PROCEDURE — 95700 EEG CONT REC W/VID EEG TECH: CPT

## 2025-05-14 PROCEDURE — 11000001 HC ACUTE MED/SURG PRIVATE ROOM

## 2025-05-14 PROCEDURE — 95720 EEG PHY/QHP EA INCR W/VEEG: CPT | Mod: ,,, | Performed by: PSYCHIATRY & NEUROLOGY

## 2025-05-14 PROCEDURE — 94640 AIRWAY INHALATION TREATMENT: CPT

## 2025-05-14 PROCEDURE — 94761 N-INVAS EAR/PLS OXIMETRY MLT: CPT

## 2025-05-14 PROCEDURE — 80053 COMPREHEN METABOLIC PANEL: CPT

## 2025-05-14 PROCEDURE — 99223 1ST HOSP IP/OBS HIGH 75: CPT | Mod: ,,, | Performed by: PSYCHIATRY & NEUROLOGY

## 2025-05-14 PROCEDURE — 80201 ASSAY OF TOPIRAMATE: CPT

## 2025-05-14 PROCEDURE — 36415 COLL VENOUS BLD VENIPUNCTURE: CPT

## 2025-05-14 PROCEDURE — 25000003 PHARM REV CODE 250

## 2025-05-14 PROCEDURE — 80235 DRUG ASSAY LACOSAMIDE: CPT

## 2025-05-14 PROCEDURE — 80307 DRUG TEST PRSMV CHEM ANLYZR: CPT

## 2025-05-14 RX ORDER — CLONAZEPAM 0.25 MG/1
0.25 TABLET, ORALLY DISINTEGRATING ORAL NIGHTLY
Status: DISCONTINUED | OUTPATIENT
Start: 2025-05-14 | End: 2025-05-17

## 2025-05-14 RX ORDER — ACETAMINOPHEN 325 MG/1
650 TABLET ORAL EVERY 4 HOURS PRN
Status: DISCONTINUED | OUTPATIENT
Start: 2025-05-14 | End: 2025-05-17 | Stop reason: HOSPADM

## 2025-05-14 RX ORDER — VENLAFAXINE HYDROCHLORIDE 37.5 MG/1
37.5 CAPSULE, EXTENDED RELEASE ORAL DAILY
Status: DISCONTINUED | OUTPATIENT
Start: 2025-05-15 | End: 2025-05-17 | Stop reason: HOSPADM

## 2025-05-14 RX ORDER — LORAZEPAM 2 MG/ML
2 INJECTION INTRAMUSCULAR ONCE
Status: DISCONTINUED | OUTPATIENT
Start: 2025-05-14 | End: 2025-05-14

## 2025-05-14 RX ORDER — BUDESONIDE 0.5 MG/2ML
0.5 INHALANT ORAL EVERY 8 HOURS PRN
Status: DISCONTINUED | OUTPATIENT
Start: 2025-05-14 | End: 2025-05-17 | Stop reason: HOSPADM

## 2025-05-14 RX ORDER — HYDROXYZINE PAMOATE 25 MG/1
50 CAPSULE ORAL 2 TIMES DAILY
Status: DISCONTINUED | OUTPATIENT
Start: 2025-05-14 | End: 2025-05-17 | Stop reason: HOSPADM

## 2025-05-14 RX ORDER — GABAPENTIN 300 MG/1
300 CAPSULE ORAL NIGHTLY
Status: DISCONTINUED | OUTPATIENT
Start: 2025-05-14 | End: 2025-05-17 | Stop reason: HOSPADM

## 2025-05-14 RX ORDER — BUDESONIDE 0.5 MG/2ML
0.5 INHALANT ORAL EVERY 8 HOURS
Status: DISCONTINUED | OUTPATIENT
Start: 2025-05-14 | End: 2025-05-14

## 2025-05-14 RX ORDER — AMLODIPINE BESYLATE 5 MG/1
5 TABLET ORAL NIGHTLY
Status: DISCONTINUED | OUTPATIENT
Start: 2025-05-14 | End: 2025-05-17 | Stop reason: HOSPADM

## 2025-05-14 RX ORDER — LORAZEPAM 2 MG/ML
2 INJECTION INTRAMUSCULAR EVERY 5 MIN PRN
Status: DISCONTINUED | OUTPATIENT
Start: 2025-05-14 | End: 2025-05-17 | Stop reason: HOSPADM

## 2025-05-14 RX ORDER — IPRATROPIUM BROMIDE AND ALBUTEROL SULFATE 2.5; .5 MG/3ML; MG/3ML
3 SOLUTION RESPIRATORY (INHALATION) EVERY 12 HOURS
Status: DISCONTINUED | OUTPATIENT
Start: 2025-05-14 | End: 2025-05-17

## 2025-05-14 RX ORDER — DOCUSATE SODIUM 100 MG/1
100 CAPSULE, LIQUID FILLED ORAL 2 TIMES DAILY PRN
Status: DISCONTINUED | OUTPATIENT
Start: 2025-05-14 | End: 2025-05-17 | Stop reason: HOSPADM

## 2025-05-14 RX ORDER — ONDANSETRON 8 MG/1
8 TABLET, ORALLY DISINTEGRATING ORAL EVERY 8 HOURS PRN
Status: DISCONTINUED | OUTPATIENT
Start: 2025-05-14 | End: 2025-05-17 | Stop reason: HOSPADM

## 2025-05-14 RX ORDER — VENLAFAXINE HYDROCHLORIDE 75 MG/1
75 CAPSULE, EXTENDED RELEASE ORAL DAILY
Status: DISCONTINUED | OUTPATIENT
Start: 2025-05-15 | End: 2025-05-17 | Stop reason: HOSPADM

## 2025-05-14 RX ORDER — LATANOPROST 50 UG/ML
1 SOLUTION/ DROPS OPHTHALMIC NIGHTLY
Status: DISCONTINUED | OUTPATIENT
Start: 2025-05-14 | End: 2025-05-17 | Stop reason: HOSPADM

## 2025-05-14 RX ORDER — LISINOPRIL 20 MG/1
40 TABLET ORAL NIGHTLY
Status: DISCONTINUED | OUTPATIENT
Start: 2025-05-14 | End: 2025-05-17 | Stop reason: HOSPADM

## 2025-05-14 RX ORDER — DOCUSATE SODIUM 100 MG/1
100 CAPSULE, LIQUID FILLED ORAL 2 TIMES DAILY
Status: DISCONTINUED | OUTPATIENT
Start: 2025-05-14 | End: 2025-05-14

## 2025-05-14 RX ORDER — SODIUM CHLORIDE 0.9 % (FLUSH) 0.9 %
10 SYRINGE (ML) INJECTION
Status: DISCONTINUED | OUTPATIENT
Start: 2025-05-14 | End: 2025-05-17 | Stop reason: HOSPADM

## 2025-05-14 RX ADMIN — GABAPENTIN 300 MG: 300 CAPSULE ORAL at 09:05

## 2025-05-14 RX ADMIN — LISINOPRIL 40 MG: 20 TABLET ORAL at 09:05

## 2025-05-14 RX ADMIN — HYDROXYZINE PAMOATE 50 MG: 25 CAPSULE ORAL at 09:05

## 2025-05-14 RX ADMIN — ACETAMINOPHEN 650 MG: 325 TABLET ORAL at 09:05

## 2025-05-14 RX ADMIN — AMLODIPINE BESYLATE 5 MG: 5 TABLET ORAL at 09:05

## 2025-05-14 RX ADMIN — CLONAZEPAM 0.25 MG: 0.25 TABLET, ORALLY DISINTEGRATING ORAL at 09:05

## 2025-05-14 RX ADMIN — IPRATROPIUM BROMIDE AND ALBUTEROL SULFATE 3 ML: 2.5; .5 SOLUTION RESPIRATORY (INHALATION) at 08:05

## 2025-05-14 RX ADMIN — LATANOPROST 1 DROP: 50 SOLUTION OPHTHALMIC at 09:05

## 2025-05-14 RX ADMIN — HYDROXYZINE PAMOATE 50 MG: 25 CAPSULE ORAL at 01:05

## 2025-05-14 NOTE — SUBJECTIVE & OBJECTIVE
Past Medical History:   Diagnosis Date    Anxiety     Arthritis     Asthma     Cancer of splenic flexure     CKD (chronic kidney disease)     COPD (chronic obstructive pulmonary disease) 07/05/2022    Decreased hearing of left ear 12/07/2022    Depressive disorder 07/05/2022    Epilepsy 07/05/2022    Has had seizures since the age of 13. Initial seizure was in the setting of acute illness with hydronephrosis and continued to have seizures frequently. Used to stare and throw dolls at her sister and did not know they were seizures.  SHe used to have 30-40 seizures a month. Currently seizure frequency is about 1 every 6 months. She is on vimpat 200 BID, topamax 200 BID. She also takes clorazepate    Gastroesophageal reflux disease 07/05/2022    Glaucoma 07/05/2022    Hypertension     IGT (impaired glucose tolerance)     Low back pain 09/21/2022    Moderate persistent asthma without complication 08/25/2022    Neuropathy 07/05/2022    On home oxygen therapy 09/21/2022    KHANH on CPAP     Overactive bladder 07/05/2022    Seasonal allergies 11/02/2022       Past Surgical History:   Procedure Laterality Date    COLON SURGERY      COLONOSCOPY      COMPLEX CYSTOMETROGRAM      EMG      HYSTERECTOMY      Left breas biopsy      Right breast biopsy         Review of patient's allergies indicates:   Allergen Reactions    Cephalexin      Other reaction(s): Seizure  hallucinations    Other reaction(s): Unknown    Ciprofloxacin      Other reaction(s): Seiurus  Other reaction(s): Unknown    Codeine      Other reaction(s): Seizure  seizure      Metronidazole      Other reaction(s): Seizure    Metronidazole hcl      Other reaction(s): Unknown    Naproxen      Other reaction(s): Seizure  Nausea/seizure      Pantoprazole sodium      Other reaction(s): Unknown    Tetracycline      Other reaction(s): Seizure  seizure      Tramadol Other (See Comments)     Other reaction(s): Seizure  SEIZURES HALLUCINATION         No current  facility-administered medications on file prior to encounter.     Current Outpatient Medications on File Prior to Encounter   Medication Sig    amLODIPine (NORVASC) 5 MG tablet Take 2 tablets (10 mg total) by mouth once daily. (Patient taking differently: Take 5 mg by mouth once daily.)    clonazePAM (KLONOPIN) 1 MG tablet Take 0.5 tablets (0.5 mg total) by mouth nightly as needed for Anxiety.    gabapentin (NEURONTIN) 300 MG capsule Take 1 capsule (300 mg total) by mouth every evening.    hydrOXYzine pamoate (VISTARIL) 25 MG Cap Take 2 capsules (50 mg total) by mouth 2 (two) times daily.    lacosamide (VIMPAT) 200 mg Tab tablet Take 1 tablet (200 mg total) by mouth every 12 (twelve) hours.    latanoprost 0.005 % ophthalmic solution Place 1 drop into both eyes nightly.    lisinopriL (PRINIVIL,ZESTRIL) 40 MG tablet Take 1 tablet (40 mg total) by mouth once daily.    topiramate (TOPAMAX) 100 MG tablet Take 1.5 tablets (150 mg total) by mouth 2 (two) times daily. (Patient taking differently: Take 100 mg by mouth 2 (two) times daily.)    venlafaxine (EFFEXOR-XR) 37.5 MG 24 hr capsule Take 1 capsule (37.5 mg total) by mouth once daily. To take with 75mg once daily    albuterol-ipratropium (DUO-NEB) 2.5 mg-0.5 mg/3 mL nebulizer solution PLEASE SEE ATTACHED FOR DETAILED DIRECTIONS    budesonide (PULMICORT) 0.5 mg/2 mL nebulizer solution Take 0.5 mg by nebulization daily as needed.    levocetirizine (XYZAL) 5 MG tablet Take 5 mg by mouth every evening.    miscellaneous medical supply Kit 1 each by Misc.(Non-Drug; Combo Route) route Daily. ROUND SHOWER STOOL; NEEDED FOR 99 MONTHS    nebulizer and compressor Eli 1 each by Misc.(Non-Drug; Combo Route) route every 6 (six) hours as needed (wheezing).     Continuous Infusions:    Family History       Problem Relation (Age of Onset)    Hyperlipidemia Sister    Hypertension Mother, Father    Kidney disease Sister    Stroke Mother, Father          Tobacco Use    Smoking status:  Never     Passive exposure: Never    Smokeless tobacco: Never   Substance and Sexual Activity    Alcohol use: Not Currently    Drug use: Never    Sexual activity: Not on file     Review of Systems   Neurological:  Positive for seizures.     Objective:     Vital Signs (Most Recent):  Temp: 98.4 °F (36.9 °C) (05/14/25 1130)  Pulse: 80 (05/14/25 1135)  Resp: 18 (05/14/25 1130)  BP: 137/83 (05/14/25 1130)  SpO2: 96 % (05/14/25 1130) Vital Signs (24h Range):  Temp:  [98.4 °F (36.9 °C)] 98.4 °F (36.9 °C)  Pulse:  [78-80] 80  Resp:  [18] 18  SpO2:  [96 %] 96 %  BP: (137)/(83) 137/83     Weight: 132.5 kg (292 lb 1.8 oz)  Body mass index is 48.61 kg/m².     Physical Exam  Vitals and nursing note reviewed.   Constitutional:       General: She is not in acute distress.     Appearance: Normal appearance. She is obese. She is not ill-appearing or diaphoretic.   HENT:      Head: Normocephalic and atraumatic.      Right Ear: External ear normal.      Left Ear: External ear normal.      Nose: Nose normal. No congestion or rhinorrhea.      Mouth/Throat:      Mouth: Mucous membranes are moist.      Pharynx: Oropharynx is clear.   Eyes:      General: No scleral icterus.     Extraocular Movements: Extraocular movements intact.      Pupils: Pupils are equal, round, and reactive to light.   Pulmonary:      Effort: Pulmonary effort is normal.   Abdominal:      Palpations: Abdomen is soft.   Musculoskeletal:         General: Normal range of motion.      Cervical back: Normal range of motion and neck supple.      Right lower leg: No edema.      Left lower leg: No edema.   Skin:     General: Skin is warm and dry.   Neurological:      Mental Status: She is alert and oriented to person, place, and time.      Cranial Nerves: Cranial nerves 2-12 are intact.      Motor: Motor strength is normal.     Coordination: Finger-Nose-Finger Test normal.      Deep Tendon Reflexes:      Reflex Scores:       Bicep reflexes are 2+ on the right side and 2+ on  the left side.       Brachioradialis reflexes are 2+ on the right side and 2+ on the left side.       Patellar reflexes are 1+ on the right side and 1+ on the left side.       Achilles reflexes are 1+ on the right side and 1+ on the left side.  Psychiatric:         Mood and Affect: Mood normal.         Speech: Speech normal.         Behavior: Behavior normal.         Thought Content: Thought content normal.         Judgment: Judgment normal.            NEUROLOGICAL EXAMINATION:     MENTAL STATUS   Oriented to person, place, and time.   Attention: normal. Concentration: normal.   Speech: speech is normal   Level of consciousness: alert    CRANIAL NERVES   Cranial nerves II through XII intact.     CN III, IV, VI   Pupils are equal, round, and reactive to light.    MOTOR EXAM   Muscle bulk: normal  Overall muscle tone: normal    Strength   Strength 5/5 throughout.     REFLEXES     Reflexes   Right brachioradialis: 2+  Left brachioradialis: 2+  Right biceps: 2+  Left biceps: 2+  Right patellar: 1+  Left patellar: 1+  Right achilles: 1+  Left achilles: 1+  Right plantar: normal  Left plantar: normal    SENSORY EXAM   Light touch normal.     GAIT AND COORDINATION      Coordination   Finger to nose coordination: normal    Tremor   Resting tremor: absent  Intention tremor: absent  Action tremor: absent      Significant Labs: CBC:   Recent Labs   Lab 05/14/25  1155   WBC 6.60   HGB 12.3   HCT 40.3        CMP:   Recent Labs   Lab 05/14/25  1155   *      K 3.7      CO2 25   BUN 18   CREATININE 1.3   CALCIUM 9.4   PROT 7.5   ALBUMIN 3.2*   BILITOT 0.4   ALKPHOS 67   AST 17   ALT 19   ANIONGAP 8       Significant Studies: I have reviewed all pertinent imaging results/findings within the past 24 hours.

## 2025-05-14 NOTE — ASSESSMENT & PLAN NOTE
- hold TPM and LCS  - decrease CZP to 0.25mg QHS  - check CZP, TPM, LCS, CBC, CMP, and Utox  - continuous vEEG   - PRN IV ativan for GTC >5 minutes, notify Epilepsy on call if administering  - seizure precautions, suction and oxygen at bedside  - fall precautions, side rail padding in place  - Continuous pulse oximetry   - telemetry

## 2025-05-14 NOTE — H&P
"[facility] - Neurosurgery ([facility])  Neurology-Epilepsy  History & Physical    Patient Name: Christie Marcum  MRN: [id number]   Admission Date: 5/14/2025  Code Status: Full Code   Attending Provider: Vinod Gillespie MD   Primary Care Physician: Mary Leon MD  Principal Problem:<principal problem not specified>    Subjective:     Chief Complaint:  Seizure     HPI:   Christie Marcum is a 64 y.o. female with history of epilepsy, asthma, hypertension, lumbar radiculopathy, neuropathy, KHANH not on CPAP admitted to EMU for event capture and characterization. Patient reports her seizures started at 11 years old after being acutely ill and developed a resultant "scar" on her left brain. Since that hospitalization, she has been having seizures. She used to have 30-40 seizures a month, but currently her seizure frequency is about 8 every month. She follows with Dr. Worley for her epilepsy.     Event type 1 brief alteration in consciousness  Aura- "nervous feeling in stomach"  Event- "a few seconds" mumbling, quiet, dazed, will kick one or both legs (L>R), as a child she threw dolls at her sister  Postictal- 15 min confusion  Occurs 8x Monthly, last event May 2, 2025  Has had unknown total lifetime events    Event type 2 GTC  Aura- unknown  Event- shaking of limbs and LoC. Left tongue biting +/- urinary incontinence  Postictal- 30 min confusion  Occurs infrequently, last event unknown  Has had 100 total lifetime events    Triggers: loud Sounds, pain, sleep loss, caffeine, sweets (2 slices cake or 4 donuts)    Current AEDs:  - TPM 100mg BID  - LCS 200mg BID  - CZP 0.5mg QHS  Has not noticed side effects from current regimen. Reports adherence, last dose 5/14/2025 AM.    Prior medications and SE/reason for stopping:  Felbatol   Carbamazepine  Dilantin  Depakote  Phenobarbital  Keppra  Lamictal  Zonisamide  Lyrica  Clorazepate    Prior seizure evaluation:  - EEG none on file  - [facility] in which there were single seizure recorded after 6 days after her AEDs had been discontinued.   - MRI none on file but patient self-reports scar on left brain? affecting sound sensitivity    Family history negative for childhood epilepsy. Great maternal Uncle and mother both has seizures as older adults. Mom and dad both passed after strokes.      Past Medical History:   Diagnosis Date    Anxiety     Arthritis     Asthma     Cancer of splenic flexure     CKD (chronic kidney disease)     COPD (chronic obstructive pulmonary disease) 07/05/2022    Decreased hearing of left ear 12/07/2022    Depressive disorder 07/05/2022    Epilepsy 07/05/2022    Has had seizures since the age of 13. Initial seizure was in the setting of acute illness with hydronephrosis and continued to have seizures frequently. Used to stare and throw dolls at her sister and did not know they were seizures.  SHe used to have 30-40 seizures a month. Currently seizure frequency is about 1 every 6 months. She is on vimpat 200 BID, topamax 200 BID. She also takes clorazepate    Gastroesophageal reflux disease 07/05/2022    Glaucoma 07/05/2022    Hypertension     IGT (impaired glucose tolerance)     Low back pain 09/21/2022    Moderate persistent asthma without complication 08/25/2022    Neuropathy 07/05/2022    On home oxygen therapy 09/21/2022    KHANH on CPAP     Overactive bladder 07/05/2022    Seasonal allergies 11/02/2022       Past Surgical History:   Procedure Laterality Date    COLON SURGERY      COLONOSCOPY      COMPLEX CYSTOMETROGRAM      EMG      HYSTERECTOMY      Left breas biopsy      Right breast biopsy         Review of patient's allergies indicates:   Allergen Reactions    Cephalexin      Other reaction(s): Seizure  hallucinations    Other reaction(s): Unknown    Ciprofloxacin      Other reaction(s): Seiurus  Other reaction(s): Unknown    Codeine      Other reaction(s): Seizure  seizure       Metronidazole      Other reaction(s): Seizure    Metronidazole hcl      Other reaction(s): Unknown    Naproxen      Other reaction(s): Seizure  Nausea/seizure      Pantoprazole sodium      Other reaction(s): Unknown    Tetracycline      Other reaction(s): Seizure  seizure      Tramadol Other (See Comments)     Other reaction(s): Seizure  SEIZURES HALLUCINATION         No current facility-administered medications on file prior to encounter.     Current Outpatient Medications on File Prior to Encounter   Medication Sig    amLODIPine (NORVASC) 5 MG tablet Take 2 tablets (10 mg total) by mouth once daily. (Patient taking differently: Take 5 mg by mouth once daily.)    clonazePAM (KLONOPIN) 1 MG tablet Take 0.5 tablets (0.5 mg total) by mouth nightly as needed for Anxiety.    gabapentin (NEURONTIN) 300 MG capsule Take 1 capsule (300 mg total) by mouth every evening.    hydrOXYzine pamoate (VISTARIL) 25 MG Cap Take 2 capsules (50 mg total) by mouth 2 (two) times daily.    lacosamide (VIMPAT) 200 mg Tab tablet Take 1 tablet (200 mg total) by mouth every 12 (twelve) hours.    latanoprost 0.005 % ophthalmic solution Place 1 drop into both eyes nightly.    lisinopriL (PRINIVIL,ZESTRIL) 40 MG tablet Take 1 tablet (40 mg total) by mouth once daily.    topiramate (TOPAMAX) 100 MG tablet Take 1.5 tablets (150 mg total) by mouth 2 (two) times daily. (Patient taking differently: Take 100 mg by mouth 2 (two) times daily.)    venlafaxine (EFFEXOR-XR) 37.5 MG 24 hr capsule Take 1 capsule (37.5 mg total) by mouth once daily. To take with 75mg once daily    albuterol-ipratropium (DUO-NEB) 2.5 mg-0.5 mg/3 mL nebulizer solution PLEASE SEE ATTACHED FOR DETAILED DIRECTIONS    budesonide (PULMICORT) 0.5 mg/2 mL nebulizer solution Take 0.5 mg by nebulization daily as needed.    levocetirizine (XYZAL) 5 MG tablet Take 5 mg by mouth every evening.    miscellaneous medical supply Kit 1 each by Misc.(Non-Drug; Combo Route) route Daily. ROUND  SHOWER STOOL; NEEDED FOR 99 MONTHS    nebulizer and compressor Eli 1 each by Misc.(Non-Drug; Combo Route) route every 6 (six) hours as needed (wheezing).     Continuous Infusions:    Family History       Problem Relation (Age of Onset)    Hyperlipidemia Sister    Hypertension Mother, Father    Kidney disease Sister    Stroke Mother, Father          Tobacco Use    Smoking status: Never     Passive exposure: Never    Smokeless tobacco: Never   Substance and Sexual Activity    Alcohol use: Not Currently    Drug use: Never    Sexual activity: Not on file     Review of Systems   Neurological:  Positive for seizures.     Objective:     Vital Signs (Most Recent):  Temp: 98.4 °F (36.9 °C) (05/14/25 1130)  Pulse: 80 (05/14/25 1135)  Resp: 18 (05/14/25 1130)  BP: 137/83 (05/14/25 1130)  SpO2: 96 % (05/14/25 1130) Vital Signs (24h Range):  Temp:  [98.4 °F (36.9 °C)] 98.4 °F (36.9 °C)  Pulse:  [78-80] 80  Resp:  [18] 18  SpO2:  [96 %] 96 %  BP: (137)/(83) 137/83     Weight: 132.5 kg (292 lb 1.8 oz)  Body mass index is 48.61 kg/m².     Physical Exam  Vitals and nursing note reviewed.   Constitutional:       General: She is not in acute distress.     Appearance: Normal appearance. She is obese. She is not ill-appearing or diaphoretic.   HENT:      Head: Normocephalic and atraumatic.      Right Ear: External ear normal.      Left Ear: External ear normal.      Nose: Nose normal. No congestion or rhinorrhea.      Mouth/Throat:      Mouth: Mucous membranes are moist.      Pharynx: Oropharynx is clear.   Eyes:      General: No scleral icterus.     Extraocular Movements: Extraocular movements intact.      Pupils: Pupils are equal, round, and reactive to light.   Pulmonary:      Effort: Pulmonary effort is normal.   Abdominal:      Palpations: Abdomen is soft.   Musculoskeletal:         General: Normal range of motion.      Cervical back: Normal range of motion and neck supple.      Right lower leg: No edema.      Left lower leg: No  edema.   Skin:     General: Skin is warm and dry.   Neurological:      Mental Status: She is alert and oriented to person, place, and time.      Cranial Nerves: Cranial nerves 2-12 are intact.      Motor: Motor strength is normal.     Coordination: Finger-Nose-Finger Test normal.      Deep Tendon Reflexes:      Reflex Scores:       Bicep reflexes are 2+ on the right side and 2+ on the left side.       Brachioradialis reflexes are 2+ on the right side and 2+ on the left side.       Patellar reflexes are 1+ on the right side and 1+ on the left side.       Achilles reflexes are 1+ on the right side and 1+ on the left side.  Psychiatric:         Mood and Affect: Mood normal.         Speech: Speech normal.         Behavior: Behavior normal.         Thought Content: Thought content normal.         Judgment: Judgment normal.            NEUROLOGICAL EXAMINATION:     MENTAL STATUS   Oriented to person, place, and time.   Attention: normal. Concentration: normal.   Speech: speech is normal   Level of consciousness: alert    CRANIAL NERVES   Cranial nerves II through XII intact.     CN III, IV, VI   Pupils are equal, round, and reactive to light.    MOTOR EXAM   Muscle bulk: normal  Overall muscle tone: normal    Strength   Strength 5/5 throughout.     REFLEXES     Reflexes   Right brachioradialis: 2+  Left brachioradialis: 2+  Right biceps: 2+  Left biceps: 2+  Right patellar: 1+  Left patellar: 1+  Right achilles: 1+  Left achilles: 1+  Right plantar: normal  Left plantar: normal    SENSORY EXAM   Light touch normal.     GAIT AND COORDINATION      Coordination   Finger to nose coordination: normal    Tremor   Resting tremor: absent  Intention tremor: absent  Action tremor: absent      Significant Labs: CBC:   Recent Labs   Lab 05/14/25  1155   WBC 6.60   HGB 12.3   HCT 40.3        CMP:   Recent Labs   Lab 05/14/25  1155   *      K 3.7      CO2 25   BUN 18   CREATININE 1.3   CALCIUM 9.4   PROT 7.5    ALBUMIN 3.2*   BILITOT 0.4   ALKPHOS 67   AST 17   ALT 19   ANIONGAP 8       Significant Studies: I have reviewed all pertinent imaging results/findings within the past 24 hours.  Assessment and Plan:     Peripheral polyneuropathy  2/2 radiculopathy  Continuing home medications.  GBP 300mg QHS      Low back pain  Chronic  PRN tylenol     KHANH (obstructive sleep apnea)  Reports CPAP machine is broken for a year or so.   Needs HST   RF for seizures  Referral to sleep medicine at discharge    Glaucoma  Continuing home medications.      Epilepsy  - hold TPM and LCS  - decrease CZP to 0.25mg QHS  - check CZP, TPM, LCS, CBC, CMP, and Utox  - continuous vEEG   - PRN IV ativan for GTC >5 minutes, notify Epilepsy on call if administering  - seizure precautions, suction and oxygen at bedside  - fall precautions, side rail padding in place  - Continuous pulse oximetry   - telemetry      Depressive disorder  Continuing home medications.      Anxiety  Continuing home medications.          VTE Risk Mitigation (From admission, onward)           Ordered     IP VTE HIGH RISK PATIENT  Once         05/14/25 1142     Place sequential compression device  Until discontinued         05/14/25 1142                    Dale Linder MD  Neurology-Epilepsy  [facility] - Neurosurgery ([facility])

## 2025-05-14 NOTE — HPI
"Christie Marcum is a 64 y.o. female with history of epilepsy, asthma, hypertension, lumbar radiculopathy, neuropathy, KHANH not on CPAP admitted to EMU for event capture and characterization. Patient reports her seizures started at 11 years old after being acutely ill and developed a resultant "scar" on her left brain. Since that hospitalization, she has been having seizures. She used to have 30-40 seizures a month, but currently her seizure frequency is about 8 every month. She follows with Dr. Worley for her epilepsy.     Event type 1 brief alteration in consciousness  Aura- "nervous feeling in stomach"  Event- "a few seconds" mumbling, quiet, dazed, will kick one or both legs (L>R), as a child she threw dolls at her sister  Postictal- 15 min confusion  Occurs 8x Monthly, last event May 2, 2025  Has had unknown total lifetime events    Event type 2 GTC  Aura- unknown  Event- shaking of limbs and LoC. Left tongue biting +/- urinary incontinence  Postictal- 30 min confusion  Occurs infrequently, last event unknown  Has had 100 total lifetime events    Triggers: loud Sounds, pain, sleep loss, caffeine, sweets (2 slices cake or 4 donuts)    Current AEDs:  - TPM 100mg BID  - LCS 200mg BID  - CZP 0.5mg QHS  Has not noticed side effects from current regimen. Reports adherence, last dose 5/14/2025 AM.    Prior medications and SE/reason for stopping:  Felbatol   Carbamazepine  Dilantin  Depakote  Phenobarbital  Keppra  Lamictal  Zonisamide  Lyrica  Clorazepate    Prior seizure evaluation:  - EEG none on file  - Patient reports [place] approximately ?20 years ago in which there were single seizure recorded after 6 days after her AEDs had been discontinued.   - MRI none on file but patient self-reports scar on left brain? affecting sound sensitivity    Family history negative for childhood epilepsy. Great maternal Uncle and mother both has seizures as older adults. Mom and dad both passed after " strokes.

## 2025-05-14 NOTE — NURSING
EMU NURSING INTERVIEW  Pt admitted to EMU room 904, EMU team notified.  Onset-When did your seizures start? At 11 years old   Aura-Do you experience an aura? Yes, feels anxious before seizures begin  Symptoms-What symptoms do you experience? Confusion that lasts up to 4 mins  Triggers-Do you have any Triggers? Sounds such as birds chirping or train sounds  Do you bite your tongue? yes  Do become incontinent of bladder or bowels? Incontinent of bladder  Have you been told your BP or oxygen drops during an event? no    Bed locked, bed alarm on and call light in reach. Educated to call staff before ambulating. Educated to use event button when patient feels like they will have an event or when an event is suspected. Pt and family verbalize understanding.

## 2025-05-14 NOTE — ASSESSMENT & PLAN NOTE
Reports CPAP machine is broken for a year or so.   Needs HST   RF for seizures  Referral to sleep medicine at discharge

## 2025-05-15 PROCEDURE — 25000003 PHARM REV CODE 250

## 2025-05-15 PROCEDURE — 99499 UNLISTED E&M SERVICE: CPT | Mod: ,,, | Performed by: PHYSICIAN ASSISTANT

## 2025-05-15 PROCEDURE — 99900035 HC TECH TIME PER 15 MIN (STAT)

## 2025-05-15 PROCEDURE — 95720 EEG PHY/QHP EA INCR W/VEEG: CPT | Mod: ,,, | Performed by: PSYCHIATRY & NEUROLOGY

## 2025-05-15 PROCEDURE — 25000242 PHARM REV CODE 250 ALT 637 W/ HCPCS

## 2025-05-15 PROCEDURE — 94761 N-INVAS EAR/PLS OXIMETRY MLT: CPT

## 2025-05-15 PROCEDURE — 27000221 HC OXYGEN, UP TO 24 HOURS

## 2025-05-15 PROCEDURE — 95714 VEEG EA 12-26 HR UNMNTR: CPT

## 2025-05-15 PROCEDURE — 94640 AIRWAY INHALATION TREATMENT: CPT

## 2025-05-15 PROCEDURE — 11000001 HC ACUTE MED/SURG PRIVATE ROOM

## 2025-05-15 PROCEDURE — 99233 SBSQ HOSP IP/OBS HIGH 50: CPT | Mod: FS,,, | Performed by: PSYCHIATRY & NEUROLOGY

## 2025-05-15 RX ADMIN — AMLODIPINE BESYLATE 5 MG: 5 TABLET ORAL at 09:05

## 2025-05-15 RX ADMIN — GABAPENTIN 300 MG: 300 CAPSULE ORAL at 09:05

## 2025-05-15 RX ADMIN — CLONAZEPAM 0.25 MG: 0.25 TABLET, ORALLY DISINTEGRATING ORAL at 09:05

## 2025-05-15 RX ADMIN — IPRATROPIUM BROMIDE AND ALBUTEROL SULFATE 3 ML: 2.5; .5 SOLUTION RESPIRATORY (INHALATION) at 08:05

## 2025-05-15 RX ADMIN — VENLAFAXINE HYDROCHLORIDE 37.5 MG: 37.5 CAPSULE, EXTENDED RELEASE ORAL at 09:05

## 2025-05-15 RX ADMIN — HYDROXYZINE PAMOATE 50 MG: 25 CAPSULE ORAL at 09:05

## 2025-05-15 RX ADMIN — VENLAFAXINE HYDROCHLORIDE 75 MG: 75 CAPSULE, EXTENDED RELEASE ORAL at 09:05

## 2025-05-15 RX ADMIN — IPRATROPIUM BROMIDE AND ALBUTEROL SULFATE 3 ML: 2.5; .5 SOLUTION RESPIRATORY (INHALATION) at 07:05

## 2025-05-15 RX ADMIN — ONDANSETRON 8 MG: 8 TABLET, ORALLY DISINTEGRATING ORAL at 05:05

## 2025-05-15 RX ADMIN — LISINOPRIL 40 MG: 20 TABLET ORAL at 09:05

## 2025-05-15 RX ADMIN — LATANOPROST 1 DROP: 50 SOLUTION OPHTHALMIC at 09:05

## 2025-05-15 RX ADMIN — HYDROXYZINE PAMOATE 50 MG: 25 CAPSULE ORAL at 08:05

## 2025-05-15 NOTE — PLAN OF CARE
[facility] (Hospital)  Initial Discharge Assessment       Primary Care Provider: Mary Leon MD    Admission Diagnosis: Nonintractable epilepsy without status epilepticus, unspecified epilepsy type [G40.909]    Admission Date: 5/14/2025  Expected Discharge Date: 5/20/2025    Transition of Care Barriers: None    Payor: [employer] [facility] / Plan: [employer] / Product Type: Medicare Advantage /     Extended Emergency Contact Information  Primary Emergency Contact: Dave Gillespie  Mobile Phone: [phone]  Relation: Spouse  Preferred language: English   needed? No  Secondary Emergency Contact: Andreea Carias  Address: [address] [place] [address]           [place] [address] [facility]  Home Phone: [phone]  Mobile Phone: [phone]  Relation: Sister    Discharge Plan A: Home with family  Discharge Plan B: Home with family      [id number] - [place], [place] - [address]  AT [facility] [id number] [place] [address]  Phone: [phone] Fax: [phone]    [id number] - [place], [place] - [address]   [address] [place] [address]  Phone: [phone] Fax: [phone]  CM obtained discharge planning assessment with patient, spouse at bedside.    Initial Assessment (most recent)       Adult Discharge Assessment - 05/15/25 1202          Discharge Assessment    Assessment Type Discharge Planning Assessment     Confirmed/corrected address, phone number and insurance Yes     Confirmed Demographics Correct on Facesheet     Source of Information patient     Communicated EFRAIN with patient/caregiver Yes     Reason For Admission monitoring     People in Home spouse     Do you expect to return to your current living situation? Yes     Do you have help at home or someone to help you manage your care at home? Yes     Who are your caregiver(s) and their phone number(s)? Dave Gillespie - spouse  [phone]     Prior to hospitilization cognitive status: Alert/Oriented     Current cognitive status: Alert/Oriented     Walking or Climbing Stairs Difficulty no     Dressing/Bathing Difficulty no     Equipment Currently Used at Home none     Readmission within 30 days? No     Patient currently being followed by outpatient case management? No     Do you currently have service(s) that help you manage your care at home? No     Do you take prescription medications? Yes     Do you have prescription coverage? Yes     Do you have any problems affording any of your prescribed medications? No     Is the patient taking medications as prescribed? yes     Who is going to help you get home at discharge? family     How do you get to doctors appointments? family or friend will provide     Are you on dialysis? No     Do you take coumadin? No     Discharge Plan A Home with family     Discharge Plan B Home with family     DME Needed Upon Discharge  none     Discharge Plan discussed with: Patient     Transition of Care Barriers None        Physical Activity    On average, how many days per week do you engage in moderate to strenuous exercise (like a brisk walk)? 0 days     On average, how many minutes do you engage in exercise at this level? 0 min        Financial Resource Strain    How hard is it for you to pay for the very basics like food, housing, medical care, and heating? Not hard at all        Housing Stability    In the last 12 months, was there a time when you were not able to pay the mortgage or rent on time? No     At any time in the past 12 months, were you homeless or living in a shelter (including now)? No        Transportation Needs    In the past 12 months, has lack of transportation kept you from medical appointments or from getting medications? No     In the past 12 months, has lack of transportation kept you from meetings, work, or from getting things needed for daily living? No        Food Insecurity    Within  the past 12 months, you worried that your food would run out before you got the money to buy more. Never true     Within the past 12 months, the food you bought just didn't last and you didn't have money to get more. Never true        Stress    Do you feel stress - tense, restless, nervous, or anxious, or unable to sleep at night because your mind is troubled all the time - these days? Only a little        Social Isolation    How often do you feel lonely or isolated from those around you?  Never        Alcohol Use    Q1: How often do you have a drink containing alcohol? Never     Q2: How many drinks containing alcohol do you have on a typical day when you are drinking? Patient does not drink     Q3: How often do you have six or more drinks on one occasion? Never        Utilities    In the past 12 months has the electric, gas, oil, or water company threatened to shut off services in your home? No        Health Literacy    How often do you need to have someone help you when you read instructions, pamphlets, or other written material from your doctor or pharmacy? Never        OTHER    Name(s) of People in Home Dave - spouse

## 2025-05-15 NOTE — ASSESSMENT & PLAN NOTE
- Chronic, controlled  - Continue home Amlodipine 5 mg daily and Lisinopril 40 mg daily  - Continue to monitor

## 2025-05-15 NOTE — ASSESSMENT & PLAN NOTE
"64F PMHx of HTN, pre-DM, CKD3, peripheral neuropathy on Gabapentin 300 mg qhs, asthma, depression/anxiety on Clonazepam 0.5 mg qhs, KHANH, and epilepsy since childhood currently maintained on Lacosamide 200 mg BID and Topiramate 150 mg BID referred to EMU by Dr. Worley for event capture and characterization of 1) "lord selby help me" followed by making sign of the cross, staring, falls to ground followed by confusion 2) nocturnal events associated with tongue bite. No EEG or MRI on file.    - Continuous vEEG   - Held home Lacosamide and Topiramate on admit; Clonazepam decreased to 0.25 mg qhs on admit  - AED levels pending  - Activation procedures per protocol- medication adjustments as above, HV/PS  - IV Ativan PRN for GTC greater than 5 min - please call epilepsy on call   - Seizure precautions, suction and oxygen at bedside  - Fall precautions, side rail padding in place  - Visi monitoring with continuous pulse oximetry   - Telemetry  "

## 2025-05-15 NOTE — HOSPITAL COURSE
5/14>5/15: Admit to EMU. Held home Lacosamide and Topiramate, Clonazepam decreased to 0.25 mg qhs. No typical events. EEG with right temporal sharps, no seizures. Proceed with provoking measures for event capture- HV/PS.  5/15>5/16: EEG with abundant right temporal sharps, 2 typical events c/w electroclinical seizures with right temporal onset. Continue for additional seizure capture. Additional PB @1142 for electroclinical seizure. Plan to resume AEDs tonight @2300: IV Lacosamide 400 mg x1 followed by  mg BID and increase Topiramate to 150 mg BID. Anticipate discharge tomorrow, 5/17.  5/16>5/17: Discharge home today. AEDs returned to home doses. Outpatient follow up with Dr. Worley and will also begin surgical planning workup with outpatient MRI to be scheduled Monday.    See EEG reports for details.

## 2025-05-15 NOTE — PROGRESS NOTES
"   05/15/25 6367   Seizure Episode   Seizure Activity reported   Seizure Presentation aura present;staring;repetitive movement;eyelid flutter   Seizure Duration 2 min   Seizure Areas Involved focal   Seizure Movement focal   Seizure Extraocular Movements both eyes   Seizure Associated Symptoms cyanosis   Seizure Interventions IV access established;oxygen administration   Seizure Postictal Care emotional support provided;assessed for injury     Event button pressed , patient's  stated  "patient said that she's feeling funny and then said  oh lord selby" and when entering the room patient was lethargic and pulse oxygen level dropped to 80's patient received  prn oxygen.  "

## 2025-05-15 NOTE — PROGRESS NOTES
[facility] ([facility])  Neurology-Epilepsy  Progress Note    Patient Name: Christie Marcum  MRN: [id number]  Admission Date: 5/14/2025  Hospital Length of Stay: 1 days  Code Status: Full Code   Attending Provider: Олег Garg MD  Primary Care Physician: Mary Leon MD   Principal Problem:Epilepsy    Subjective:     Hospital Course:   5/14>5/15: Admit to EMU. Held home Lacosamide and Topiramate, Clonazepam decreased to 0.25 mg qhs. No typical events. EEG with right temporal sharps, no seizures. Proceed with provoking measures for event capture- HV/PS.    See EEG reports for details.    Interval History: NAEON. This AM, patient sitting upright in bed with SO at bedside. Patient endorses poor sleep overnight d/t bed discomfort. No typical events. Encouraged patient to eat her triggering foods and caffeine for event provocation. Discussed plan of care and answered questions at bedside.    Current Medications[1]  Continuous Infusions:    Review of Systems  Objective:     Vital Signs (Most Recent):  Temp: 98.2 °F (36.8 °C) (05/15/25 0721)  Pulse: 78 (05/15/25 0847)  Resp: 18 (05/15/25 0847)  BP: 112/71 (05/15/25 0721)  SpO2: 96 % (05/15/25 0900) Vital Signs (24h Range):  Temp:  [97.8 °F (36.6 °C)-98.4 °F (36.9 °C)] 98.2 °F (36.8 °C)  Pulse:  [75-91] 78  Resp:  [18-20] 18  SpO2:  [93 %-97 %] 96 %  BP: (112-138)/(71-83) 112/71     Weight: 132.5 kg (292 lb 1.8 oz)  Body mass index is 48.61 kg/m².     Physical Exam  Vitals reviewed.   Constitutional:       General: She is not in acute distress.     Appearance: She is not diaphoretic.   HENT:      Head: Normocephalic and atraumatic.      Comments: EEG in place  Eyes:      General: No scleral icterus.        Right eye: No discharge.         Left eye: No discharge.      Extraocular Movements: Extraocular movements intact and EOM normal.      Conjunctiva/sclera: Conjunctivae normal.      Pupils: Pupils are equal, round, and reactive to light.   Cardiovascular:  "     Rate and Rhythm: Normal rate.   Pulmonary:      Effort: Pulmonary effort is normal. No respiratory distress.   Musculoskeletal:         General: No swelling, tenderness or deformity.   Skin:     General: Skin is warm and dry.   Neurological:      General: No focal deficit present.      Mental Status: She is alert and oriented to person, place, and time. Mental status is at baseline.   Psychiatric:         Mood and Affect: Mood normal.         Speech: Speech normal.         Behavior: Behavior normal.            NEUROLOGICAL EXAMINATION:     MENTAL STATUS   Oriented to person, place, and time.   Attention: normal. Concentration: normal.   Speech: speech is normal   Level of consciousness: alert    CRANIAL NERVES     CN II   Visual fields full to confrontation.     CN III, IV, VI   Pupils are equal, round, and reactive to light.  Extraocular motions are normal.     CN V   Right corneal reflex: normal  Left corneal reflex: normal    CN VII   Facial expression full, symmetric.     CN VIII   Hearing: intact    CN XI   CN XI normal.     CN XII   CN XII normal.       Significant Labs: All pertinent lab results from the past 24 hours have been reviewed.    Significant Studies: I have reviewed all pertinent imaging results/findings within the past 24 hours.    Assessment and Plan:     * Epilepsy  64F PMHx of HTN, pre-DM, CKD3, peripheral neuropathy on Gabapentin 300 mg qhs, asthma, depression/anxiety on Clonazepam 0.5 mg qhs, KHANH, and epilepsy since childhood currently maintained on Lacosamide 200 mg BID and Topiramate 150 mg BID referred to EMU by Dr. Worley for event capture and characterization of 1) "lord sola help me" followed by making sign of the cross, staring, falls to ground followed by confusion 2) nocturnal events associated with tongue bite. No EEG or MRI on file.    - Continuous vEEG   - Held home Lacosamide and Topiramate on admit; Clonazepam decreased to 0.25 mg qhs on admit  - AED levels pending  - " Activation procedures per protocol- medication adjustments as above, HV/PS  - IV Ativan PRN for GTC greater than 5 min - please call epilepsy on call   - Seizure precautions, suction and oxygen at bedside  - Fall precautions, side rail padding in place  - Visi monitoring with continuous pulse oximetry   - Telemetry    Stage 3a chronic kidney disease  - Chronic and stable  - Follows with Nephrology as outpatient    Peripheral polyneuropathy  Low back pain  - Chronic  - Continue home Gabapentin 300 mg qhs  - Analgesics PRN, supportive care    Moderate persistent asthma without complication  - Chronic, no s/s exacerbation, stable on RA  - Duonebs and albuterol ordered  - Continuous pulse ox  - Follows with Pulm as outpatient    KHANH (obstructive sleep apnea)  Reports CPAP machine is broken for a year or so  - Referral to sleep medicine at discharge  - Continuous pulse ox    Hypertension  - Chronic, controlled  - Continue home Amlodipine 5 mg daily and Lisinopril 40 mg daily  - Continue to monitor    Glaucoma  - Chronic  - Continuing home eye drops    Depressive disorder  Anxiety  - Chronic, continue home Venlafaxine        VTE Risk Mitigation (From admission, onward)           Ordered     IP VTE HIGH RISK PATIENT  Once         05/14/25 1142     Place sequential compression device  Until discontinued         05/14/25 1142                    Mari Gillespie PA-C  Neurology-Epilepsy  [facility]  Staff: Dr. Garg       [1]   Current Facility-Administered Medications   Medication Dose Route Frequency Provider Last Rate Last Admin    acetaminophen tablet 650 mg  650 mg Oral Q4H PRN Dale Linder MD   650 mg at 05/14/25 2104    albuterol-ipratropium 2.5 mg-0.5 mg/3 mL nebulizer solution 3 mL  3 mL Nebulization Q12H Dale Linder MD   3 mL at 05/15/25 0847    amLODIPine tablet 5 mg  5 mg Oral QHS Dale Linder MD   5 mg at 05/14/25 2105    budesonide nebulizer solution 0.5 mg  0.5 mg Nebulization Q8H PRN  Dale Linder MD        clonazePAM disintegrating tablet 0.25 mg  0.25 mg Oral Nightly Dale Linder MD   0.25 mg at 05/14/25 2105    docusate sodium capsule 100 mg  100 mg Oral BID PRN Mari Gillespie PA-C        gabapentin capsule 300 mg  300 mg Oral QHS Dale Linder MD   300 mg at 05/14/25 2104    hydrOXYzine pamoate capsule 50 mg  50 mg Oral BID Dale Linder MD   50 mg at 05/15/25 0859    latanoprost 0.005 % ophthalmic solution 1 drop  1 drop Both Eyes Nightly Dale Linder MD   1 drop at 05/14/25 2108    lisinopriL tablet 40 mg  40 mg Oral QHS Dale Linder MD   40 mg at 05/14/25 2104    LORazepam injection 2 mg  2 mg Intravenous Q5 Min PRN Dale Linder MD        ondansetron disintegrating tablet 8 mg  8 mg Oral Q8H PRN Dale Linder MD        sodium chloride 0.9% flush 10 mL  10 mL Intravenous PRN Dale Linder MD        venlafaxine 24 hr capsule 37.5 mg  37.5 mg Oral Daily Dale Linder MD   37.5 mg at 05/15/25 0900    venlafaxine 24 hr capsule 75 mg  75 mg Oral Daily Dale Linder MD   75 mg at 05/15/25 0900

## 2025-05-15 NOTE — PROCEDURES
EEG REPORT      Christie Marcum  [id number]  1960    DATE OF SERVICE: 5/14/2025    [facility] -1    METHODOLOGY      Extended electroencephalographic recording is made while the patient is ambulatory and continuing normal daily activities.  Electrodes are placed according to the International 10-20 placement system and included T1 and T2 electrode placement.  Twenty four (24) channels of digital signal (sampling rate of 512/sec) was simultaneously recorded from the scalp including EKG and eye monitors.  Recording band pass was 0.1 to 100 hz and all data was stored digitally on the recorder.  The patient is instructed to press an event button when clinical symptoms occur and write the symptoms into a diary. Activation procedures which include photic stimulation, hyperventilation and instructing patients to perform simple task are done in selected patients.        The EEG is displayed on a monitor screen and can be reformatted into different montages for evaluation.  The entire recoding is submitted for computer assisted analysis to detect spike and electrographic seizure activity.  The entire recording is visually reviewed and the times identified by computer analysis as being spikes or seizures are reviewed again.  Compresses spectral analysis (CSA) is also performed on the activity recorded from each individual channel.  This is displayed as a power display of frequencies from 0 to 30 Hz over time.   The CSA analysis is done and displayed continuously.  This is reviewed for asymmetries in power between homologous areas of the scalp and for presence of changes in power which canbe seen when seizures occur.  Sections of suspected abnormalities on the CSA is then compared with the original EEG recording.  .     [employer] software was also utilized in the review of this study.  This software suite analyzes the EEG recording in multiple domains.  Coherence and rhythmicity is computed to identify EEG sections which may  contain organized seizures.  Each channel undergoes analysis to detect presence of spike and sharp waves which have special and morphological characteristic of epileptic activity.  The routine EEG recording is converted from spacial into frequency domain.  This is then displayed comparing homologous areas to identify areas of significant asymmetry.  Algorithm to identify non-cortically generated artifact is used to separate eye movement, EMG and other artifact from the EEG     Recording Times  Start on 5/14/2025  Stop on 5/15/2025    A total of 18:39:48 hours of EEG was recorded.      EEG FINDINGS:  Background activity:   The background rhythm was characterized by alpha and anterior dominant beta activity with a 10Hz posterior dominant alpha rhythm at 30-70 microvolts.   Symmetry and continuity: the background was continuous and symmetric     Sleep:   Normal sleep transients including sleep spindles, K complexes, vertex waves and POSTS were seen.    Activation procedures:   NA    Abnormal activity:   Frequent sharp waves and intermittent rhythmic delta at F8.    IMPRESSION:   Abnormal EEG due to seizure focus in the right anterior temporal region, no seizures.      Олег Garg MD  Neurology-Epilepsy.  [facility].

## 2025-05-15 NOTE — ASSESSMENT & PLAN NOTE
Low back pain  - Chronic  - Continue home Gabapentin 300 mg qhs  - Analgesics PRN, supportive care

## 2025-05-15 NOTE — ASSESSMENT & PLAN NOTE
- Chronic, no s/s exacerbation, stable on RA  - Duonebs and albuterol ordered  - Continuous pulse ox  - Follows with Pulm as outpatient

## 2025-05-15 NOTE — ASSESSMENT & PLAN NOTE
Reports CPAP machine is broken for a year or so  - Referral to sleep medicine at discharge  - Continuous pulse ox

## 2025-05-15 NOTE — SUBJECTIVE & OBJECTIVE
Interval History: NAEON. This AM, patient sitting upright in bed with SO at bedside. Patient endorses poor sleep overnight d/t bed discomfort. No typical events. Encouraged patient to eat her triggering foods and caffeine for event provocation. Discussed plan of care and answered questions at bedside.    Current Medications[1]  Continuous Infusions:    Review of Systems  Objective:     Vital Signs (Most Recent):  Temp: 98.2 °F (36.8 °C) (05/15/25 0721)  Pulse: 78 (05/15/25 0847)  Resp: 18 (05/15/25 0847)  BP: 112/71 (05/15/25 0721)  SpO2: 96 % (05/15/25 0900) Vital Signs (24h Range):  Temp:  [97.8 °F (36.6 °C)-98.4 °F (36.9 °C)] 98.2 °F (36.8 °C)  Pulse:  [75-91] 78  Resp:  [18-20] 18  SpO2:  [93 %-97 %] 96 %  BP: (112-138)/(71-83) 112/71     Weight: 132.5 kg (292 lb 1.8 oz)  Body mass index is 48.61 kg/m².     Physical Exam  Vitals reviewed.   Constitutional:       General: She is not in acute distress.     Appearance: She is not diaphoretic.   HENT:      Head: Normocephalic and atraumatic.      Comments: EEG in place  Eyes:      General: No scleral icterus.        Right eye: No discharge.         Left eye: No discharge.      Extraocular Movements: Extraocular movements intact and EOM normal.      Conjunctiva/sclera: Conjunctivae normal.      Pupils: Pupils are equal, round, and reactive to light.   Cardiovascular:      Rate and Rhythm: Normal rate.   Pulmonary:      Effort: Pulmonary effort is normal. No respiratory distress.   Musculoskeletal:         General: No swelling, tenderness or deformity.   Skin:     General: Skin is warm and dry.   Neurological:      General: No focal deficit present.      Mental Status: She is alert and oriented to person, place, and time. Mental status is at baseline.   Psychiatric:         Mood and Affect: Mood normal.         Speech: Speech normal.         Behavior: Behavior normal.            NEUROLOGICAL EXAMINATION:     MENTAL STATUS   Oriented to person, place, and time.    Attention: normal. Concentration: normal.   Speech: speech is normal   Level of consciousness: alert    CRANIAL NERVES     CN II   Visual fields full to confrontation.     CN III, IV, VI   Pupils are equal, round, and reactive to light.  Extraocular motions are normal.     CN V   Right corneal reflex: normal  Left corneal reflex: normal    CN VII   Facial expression full, symmetric.     CN VIII   Hearing: intact    CN XI   CN XI normal.     CN XII   CN XII normal.       Significant Labs: All pertinent lab results from the past 24 hours have been reviewed.    Significant Studies: I have reviewed all pertinent imaging results/findings within the past 24 hours.       [1]   Current Facility-Administered Medications   Medication Dose Route Frequency Provider Last Rate Last Admin    acetaminophen tablet 650 mg  650 mg Oral Q4H PRN Dale Linder MD   650 mg at 05/14/25 2104    albuterol-ipratropium 2.5 mg-0.5 mg/3 mL nebulizer solution 3 mL  3 mL Nebulization Q12H Dale Linder MD   3 mL at 05/15/25 0847    amLODIPine tablet 5 mg  5 mg Oral QHS Dale Linder MD   5 mg at 05/14/25 2105    budesonide nebulizer solution 0.5 mg  0.5 mg Nebulization Q8H PRN Dale Linder MD        clonazePAM disintegrating tablet 0.25 mg  0.25 mg Oral Nightly Dale Linder MD   0.25 mg at 05/14/25 2105    docusate sodium capsule 100 mg  100 mg Oral BID PRN Mari Gillespie PA-C        gabapentin capsule 300 mg  300 mg Oral QHS Dale Linder MD   300 mg at 05/14/25 2104    hydrOXYzine pamoate capsule 50 mg  50 mg Oral BID Dale Linder MD   50 mg at 05/15/25 0859    latanoprost 0.005 % ophthalmic solution 1 drop  1 drop Both Eyes Nightly Dale Linder MD   1 drop at 05/14/25 2108    lisinopriL tablet 40 mg  40 mg Oral QHS Dale Linder MD   40 mg at 05/14/25 2104    LORazepam injection 2 mg  2 mg Intravenous Q5 Min PRN Dale Linder MD        ondansetron disintegrating tablet 8 mg  8 mg Oral Q8H PRN  Dale Linder MD        sodium chloride 0.9% flush 10 mL  10 mL Intravenous PRN Dale Linder MD        venlafaxine 24 hr capsule 37.5 mg  37.5 mg Oral Daily Dale Linder MD   37.5 mg at 05/15/25 0900    venlafaxine 24 hr capsule 75 mg  75 mg Oral Daily Dale Linder MD   75 mg at 05/15/25 0900

## 2025-05-16 PROBLEM — G40.109 FOCAL EPILEPSY: Status: ACTIVE | Noted: 2022-07-05

## 2025-05-16 LAB
LACOSAMIDE SERPL-MCNC: 11.8 MCG/ML (ref 1–10)
OHS QRS DURATION: 82 MS
OHS QTC CALCULATION: 426 MS

## 2025-05-16 PROCEDURE — 25000003 PHARM REV CODE 250

## 2025-05-16 PROCEDURE — 27000221 HC OXYGEN, UP TO 24 HOURS

## 2025-05-16 PROCEDURE — 99233 SBSQ HOSP IP/OBS HIGH 50: CPT | Mod: FS,,, | Performed by: PSYCHIATRY & NEUROLOGY

## 2025-05-16 PROCEDURE — 99900035 HC TECH TIME PER 15 MIN (STAT)

## 2025-05-16 PROCEDURE — 63600175 PHARM REV CODE 636 W HCPCS: Performed by: PHYSICIAN ASSISTANT

## 2025-05-16 PROCEDURE — 93010 ELECTROCARDIOGRAM REPORT: CPT | Mod: ,,, | Performed by: INTERNAL MEDICINE

## 2025-05-16 PROCEDURE — 94761 N-INVAS EAR/PLS OXIMETRY MLT: CPT

## 2025-05-16 PROCEDURE — 11000001 HC ACUTE MED/SURG PRIVATE ROOM

## 2025-05-16 PROCEDURE — 25000242 PHARM REV CODE 250 ALT 637 W/ HCPCS

## 2025-05-16 PROCEDURE — 95714 VEEG EA 12-26 HR UNMNTR: CPT

## 2025-05-16 PROCEDURE — 94640 AIRWAY INHALATION TREATMENT: CPT

## 2025-05-16 PROCEDURE — 99499 UNLISTED E&M SERVICE: CPT | Mod: ,,, | Performed by: PHYSICIAN ASSISTANT

## 2025-05-16 PROCEDURE — 95720 EEG PHY/QHP EA INCR W/VEEG: CPT | Mod: ,,, | Performed by: PSYCHIATRY & NEUROLOGY

## 2025-05-16 PROCEDURE — 25000003 PHARM REV CODE 250: Performed by: PHYSICIAN ASSISTANT

## 2025-05-16 PROCEDURE — 93005 ELECTROCARDIOGRAM TRACING: CPT

## 2025-05-16 PROCEDURE — C9254 INJECTION, LACOSAMIDE: HCPCS | Performed by: PHYSICIAN ASSISTANT

## 2025-05-16 RX ORDER — LACOSAMIDE 100 MG/1
200 TABLET ORAL EVERY 12 HOURS
Status: DISCONTINUED | OUTPATIENT
Start: 2025-05-17 | End: 2025-05-17 | Stop reason: HOSPADM

## 2025-05-16 RX ADMIN — HYDROXYZINE PAMOATE 50 MG: 25 CAPSULE ORAL at 09:05

## 2025-05-16 RX ADMIN — LACOSAMIDE 400 MG: 10 INJECTION INTRAVENOUS at 11:05

## 2025-05-16 RX ADMIN — IPRATROPIUM BROMIDE AND ALBUTEROL SULFATE 3 ML: 2.5; .5 SOLUTION RESPIRATORY (INHALATION) at 08:05

## 2025-05-16 RX ADMIN — CLONAZEPAM 0.25 MG: 0.25 TABLET, ORALLY DISINTEGRATING ORAL at 09:05

## 2025-05-16 RX ADMIN — LATANOPROST 1 DROP: 50 SOLUTION OPHTHALMIC at 09:05

## 2025-05-16 RX ADMIN — GABAPENTIN 300 MG: 300 CAPSULE ORAL at 09:05

## 2025-05-16 RX ADMIN — IPRATROPIUM BROMIDE AND ALBUTEROL SULFATE 3 ML: 2.5; .5 SOLUTION RESPIRATORY (INHALATION) at 09:05

## 2025-05-16 RX ADMIN — VENLAFAXINE HYDROCHLORIDE 75 MG: 75 CAPSULE, EXTENDED RELEASE ORAL at 08:05

## 2025-05-16 RX ADMIN — HYDROXYZINE PAMOATE 50 MG: 25 CAPSULE ORAL at 08:05

## 2025-05-16 RX ADMIN — LISINOPRIL 40 MG: 20 TABLET ORAL at 09:05

## 2025-05-16 RX ADMIN — VENLAFAXINE HYDROCHLORIDE 37.5 MG: 37.5 CAPSULE, EXTENDED RELEASE ORAL at 08:05

## 2025-05-16 RX ADMIN — AMLODIPINE BESYLATE 5 MG: 5 TABLET ORAL at 09:05

## 2025-05-16 NOTE — PROGRESS NOTES
"[facility] - [facility] ([facility])  Neurology-Epilepsy  Progress Note    Patient Name: Christie Marcum  MRN: [id number]  Admission Date: 5/14/2025  Hospital Length of Stay: 2 days  Code Status: Full Code   Attending Provider: Олег Garg MD  Primary Care Physician: Mary Leon MD   Principal Problem:Focal epilepsy    Subjective:     Hospital Course:   5/14>5/15: Admit to EMU. Held home Lacosamide and Topiramate, Clonazepam decreased to 0.25 mg qhs. No typical events. EEG with right temporal sharps, no seizures. Proceed with provoking measures for event capture- HV/PS.  5/15>5/16: EEG with abundant right temporal sharps, 2 typical events c/w electroclinical seizures with right temporal onset. Continue for additional seizure capture. Additional PB @1142 for electroclinical seizure. Plan to resume AEDs tonight @2300: IV Lacosamide 400 mg x1 followed by  mg BID and increase Topiramate to 150 mg BID. Anticipate discharge tomorrow, 5/17.    See EEG reports for details.    Interval History: PB x2 overnight for typical events c/w electroclinical seizures. This AM, patient lying in bed resting comfortably with  at bedside. Patient recalls aura of "feeling bad like anxious in my stomach" then unaware of seizure following.  reports these were typical. Patient continues to endorse anxiety; she reports this may continue for days after a seizure at home. Discussed plan of care and answered questions at bedside.    Current Medications[1]  Continuous Infusions:    Review of Systems  Objective:     Vital Signs (Most Recent):  Temp: 98.3 °F (36.8 °C) (05/16/25 0729)  Pulse: 78 (05/16/25 0904)  Resp: 18 (05/16/25 0904)  BP: 119/66 (05/16/25 0729)  SpO2: 95 % (05/16/25 0904) Vital Signs (24h Range):  Temp:  [98 °F (36.7 °C)-98.5 °F (36.9 °C)] 98.3 °F (36.8 °C)  Pulse:  [73-89] 78  Resp:  [18] 18  SpO2:  [93 %-99 %] 95 %  BP: (119-139)/(66-84) 119/66     Weight: 132.5 kg (292 lb 1.8 oz)  Body mass index is " 48.61 kg/m².     Physical Exam  Vitals reviewed.   Constitutional:       General: She is not in acute distress.     Appearance: She is not diaphoretic.   HENT:      Head: Normocephalic and atraumatic.      Comments: EEG in place  Eyes:      General: No scleral icterus.        Right eye: No discharge.         Left eye: No discharge.      Extraocular Movements: Extraocular movements intact and EOM normal.      Conjunctiva/sclera: Conjunctivae normal.      Pupils: Pupils are equal, round, and reactive to light.   Cardiovascular:      Rate and Rhythm: Normal rate.   Pulmonary:      Effort: Pulmonary effort is normal. No respiratory distress.   Musculoskeletal:         General: No swelling, tenderness or deformity.   Skin:     General: Skin is warm and dry.   Neurological:      General: No focal deficit present.      Mental Status: She is alert and oriented to person, place, and time. Mental status is at baseline.   Psychiatric:         Mood and Affect: Mood normal.         Speech: Speech normal.         Behavior: Behavior normal.            NEUROLOGICAL EXAMINATION:     MENTAL STATUS   Oriented to person, place, and time.   Attention: normal. Concentration: normal.   Speech: speech is normal   Level of consciousness: alert    CRANIAL NERVES     CN II   Visual fields full to confrontation.     CN III, IV, VI   Pupils are equal, round, and reactive to light.  Extraocular motions are normal.     CN V   Right corneal reflex: normal  Left corneal reflex: normal    CN VII   Facial expression full, symmetric.     CN VIII   Hearing: intact    CN XI   CN XI normal.     CN XII   CN XII normal.       Significant Labs: All pertinent lab results from the past 24 hours have been reviewed.    Significant Studies: I have reviewed all pertinent imaging results/findings within the past 24 hours.    Assessment and Plan:     * Focal epilepsy  64F PMHx of HTN, pre-DM, CKD3, peripheral neuropathy on Gabapentin 300 mg qhs, asthma,  "depression/anxiety on Clonazepam 0.5 mg qhs, KHANH, and epilepsy since childhood currently maintained on Lacosamide 200 mg BID and Topiramate 150 mg BID referred to EMU by Dr. Worley for event capture and characterization of 1) "lord selby help me" followed by making sign of the cross, staring, falls to ground followed by confusion 2) nocturnal events associated with tongue bite. No EEG or MRI on file.    - Continuous vEEG   - Held home Lacosamide and Topiramate on admit; Clonazepam decreased to 0.25 mg qhs on admit  - LCM 11.8; TPM pending  - Plan to resume AEDs tonight @2300: IV Lacosamide 400 mg x1 followed by  mg BID and increase Topiramate to 150 mg BID  - Activation procedures per protocol- medication adjustments as above  - IV Ativan PRN for GTC greater than 5 min - please call epilepsy on call   - Seizure precautions, suction and oxygen at bedside  - Fall precautions, side rail padding in place  - Visi monitoring with continuous pulse oximetry   - Telemetry  - MRI Epilepsy Protocol as outpatient  - Has scheduled follow up with established neurologist, Dr. Worley on 5/28  - Outpatient virtual follow up in Epilepsy clinic with Dr. Garg to pursue surgical pathway    Stage 3a chronic kidney disease  - Chronic and stable  - Follows with Nephrology as outpatient    Peripheral polyneuropathy  Low back pain  - Chronic  - Continue home Gabapentin 300 mg qhs  - Analgesics PRN, supportive care    Moderate persistent asthma without complication  - Chronic, no s/s exacerbation, stable on RA  - Duonebs and albuterol ordered  - Continuous pulse ox  - Follows with Pulm as outpatient    KHANH (obstructive sleep apnea)  Reports CPAP machine is broken for a year or so  - Referral placed to sleep medicine   - Continuous pulse ox    Hypertension  - Chronic, controlled  - Continue home Amlodipine 5 mg daily and Lisinopril 40 mg daily  - Continue to monitor    Glaucoma  Continuing home medications.    Depressive " disorder  Anxiety  - Chronic, continue home Venlafaxine        VTE Risk Mitigation (From admission, onward)           Ordered     IP VTE HIGH RISK PATIENT  Once         05/14/25 1142     Place sequential compression device  Until discontinued         05/14/25 1142                    Mair Gillespie PA-C  Neurology-Epilepsy  [facility]  Staff: Dr. Garg       [1]   Current Facility-Administered Medications   Medication Dose Route Frequency Provider Last Rate Last Admin    acetaminophen tablet 650 mg  650 mg Oral Q4H PRN Dale Linder MD   650 mg at 05/14/25 2104    albuterol-ipratropium 2.5 mg-0.5 mg/3 mL nebulizer solution 3 mL  3 mL Nebulization Q12H Dale Linder MD   3 mL at 05/16/25 0904    amLODIPine tablet 5 mg  5 mg Oral QHS Dale Linder MD   5 mg at 05/15/25 2113    budesonide nebulizer solution 0.5 mg  0.5 mg Nebulization Q8H PRN Dale Linder MD        clonazePAM disintegrating tablet 0.25 mg  0.25 mg Oral Nightly Dale Linder MD   0.25 mg at 05/15/25 2113    docusate sodium capsule 100 mg  100 mg Oral BID PRN Mari Gillespie PA-C        gabapentin capsule 300 mg  300 mg Oral QHS Dale Linder MD   300 mg at 05/15/25 2113    hydrOXYzine pamoate capsule 50 mg  50 mg Oral BID Dale Linder MD   50 mg at 05/16/25 0821    latanoprost 0.005 % ophthalmic solution 1 drop  1 drop Both Eyes Nightly Dale Linder MD   1 drop at 05/15/25 2126    lisinopriL tablet 40 mg  40 mg Oral QHS Dale Linder MD   40 mg at 05/15/25 2113    LORazepam injection 2 mg  2 mg Intravenous Q5 Min PRN Dale Linder MD        ondansetron disintegrating tablet 8 mg  8 mg Oral Q8H PRN Dale Linder MD   8 mg at 05/15/25 1706    sodium chloride 0.9% flush 10 mL  10 mL Intravenous PRN Dale Linder MD        venlafaxine 24 hr capsule 37.5 mg  37.5 mg Oral Daily Dale Linder MD   37.5 mg at 05/16/25 0822    venlafaxine 24 hr capsule 75 mg  75 mg Oral Daily Dale Linder MD    75 mg at 05/16/25 0821

## 2025-05-16 NOTE — PROGRESS NOTES
05/16/25 1142   Seizure Episode   Seizure Activity witnessed   Seizure Presentation confusion   Seizure Duration 1 mintue   Seizure Areas Involved focal   Seizure Movement focal   Seizure Extraocular Movements both eyes   Seizure Associated Symptoms cyanosis   Seizure Interventions IV access established;airway patency maintained;oxygen administration     Was in room talking to patient and patient made a sign of the cross and blank stare  , patients pulse ox dropped and  o2 administered, team notified button pressed

## 2025-05-16 NOTE — PLAN OF CARE
Problem: Adult Inpatient Plan of Care  Goal: Plan of Care Review  5/16/2025 0353 by Isela Okeefe RN  Outcome: Progressing  5/16/2025 0345 by Isela Okeefe RN  Outcome: Progressing  Goal: Patient-Specific Goal (Individualized)  5/16/2025 0353 by Isela Okeefe RN  Outcome: Progressing  5/16/2025 0345 by Isela Okeefe RN  Outcome: Progressing  Goal: Absence of Hospital-Acquired Illness or Injury  5/16/2025 0353 by Isela Okeefe RN  Outcome: Progressing  5/16/2025 0345 by Isela Okeefe RN  Outcome: Progressing  Goal: Optimal Comfort and Wellbeing  5/16/2025 0353 by Isela Okeefe RN  Outcome: Progressing  5/16/2025 0345 by Isela Okeefe RN  Outcome: Progressing  Goal: Readiness for Transition of Care  5/16/2025 0353 by Isela Okeefe RN  Outcome: Progressing  5/16/2025 0345 by Isela Okeefe RN  Outcome: Progressing     Problem: Bariatric Environmental Safety  Goal: Safety Maintained with Care  5/16/2025 0353 by Isela Okeefe RN  Outcome: Progressing  5/16/2025 0345 by Isela Okeefe RN  Outcome: Progressing

## 2025-05-16 NOTE — SUBJECTIVE & OBJECTIVE
"Interval History: PB x2 overnight for typical events c/w electroclinical seizures. This AM, patient lying in bed resting comfortably with  at bedside. Patient recalls aura of "feeling bad like anxious in my stomach" then unaware of seizure following.  reports these were typical. Patient continues to endorse anxiety; she reports this may continue for days after a seizure at home. Discussed plan of care and answered questions at bedside.    Current Medications[1]  Continuous Infusions:    Review of Systems  Objective:     Vital Signs (Most Recent):  Temp: 98.3 °F (36.8 °C) (05/16/25 0729)  Pulse: 78 (05/16/25 0904)  Resp: 18 (05/16/25 0904)  BP: 119/66 (05/16/25 0729)  SpO2: 95 % (05/16/25 0904) Vital Signs (24h Range):  Temp:  [98 °F (36.7 °C)-98.5 °F (36.9 °C)] 98.3 °F (36.8 °C)  Pulse:  [73-89] 78  Resp:  [18] 18  SpO2:  [93 %-99 %] 95 %  BP: (119-139)/(66-84) 119/66     Weight: 132.5 kg (292 lb 1.8 oz)  Body mass index is 48.61 kg/m².     Physical Exam  Vitals reviewed.   Constitutional:       General: She is not in acute distress.     Appearance: She is not diaphoretic.   HENT:      Head: Normocephalic and atraumatic.      Comments: EEG in place  Eyes:      General: No scleral icterus.        Right eye: No discharge.         Left eye: No discharge.      Extraocular Movements: Extraocular movements intact and EOM normal.      Conjunctiva/sclera: Conjunctivae normal.      Pupils: Pupils are equal, round, and reactive to light.   Cardiovascular:      Rate and Rhythm: Normal rate.   Pulmonary:      Effort: Pulmonary effort is normal. No respiratory distress.   Musculoskeletal:         General: No swelling, tenderness or deformity.   Skin:     General: Skin is warm and dry.   Neurological:      General: No focal deficit present.      Mental Status: She is alert and oriented to person, place, and time. Mental status is at baseline.   Psychiatric:         Mood and Affect: Mood normal.         Speech: " Speech normal.         Behavior: Behavior normal.            NEUROLOGICAL EXAMINATION:     MENTAL STATUS   Oriented to person, place, and time.   Attention: normal. Concentration: normal.   Speech: speech is normal   Level of consciousness: alert    CRANIAL NERVES     CN II   Visual fields full to confrontation.     CN III, IV, VI   Pupils are equal, round, and reactive to light.  Extraocular motions are normal.     CN V   Right corneal reflex: normal  Left corneal reflex: normal    CN VII   Facial expression full, symmetric.     CN VIII   Hearing: intact    CN XI   CN XI normal.     CN XII   CN XII normal.       Significant Labs: All pertinent lab results from the past 24 hours have been reviewed.    Significant Studies: I have reviewed all pertinent imaging results/findings within the past 24 hours.       [1]   Current Facility-Administered Medications   Medication Dose Route Frequency Provider Last Rate Last Admin    acetaminophen tablet 650 mg  650 mg Oral Q4H PRN Dale Linder MD   650 mg at 05/14/25 2104    albuterol-ipratropium 2.5 mg-0.5 mg/3 mL nebulizer solution 3 mL  3 mL Nebulization Q12H Dale Linder MD   3 mL at 05/16/25 0904    amLODIPine tablet 5 mg  5 mg Oral QHS Dale Linder MD   5 mg at 05/15/25 2113    budesonide nebulizer solution 0.5 mg  0.5 mg Nebulization Q8H PRN Dale Linder MD        clonazePAM disintegrating tablet 0.25 mg  0.25 mg Oral Nightly Dale Linder MD   0.25 mg at 05/15/25 2113    docusate sodium capsule 100 mg  100 mg Oral BID PRN Mari Gillespie PA-C        gabapentin capsule 300 mg  300 mg Oral QHS Dale Linder MD   300 mg at 05/15/25 2113    hydrOXYzine pamoate capsule 50 mg  50 mg Oral BID Dale Linder MD   50 mg at 05/16/25 0821    latanoprost 0.005 % ophthalmic solution 1 drop  1 drop Both Eyes Nightly Dale Linder MD   1 drop at 05/15/25 2126    lisinopriL tablet 40 mg  40 mg Oral QHS Dale Linder MD   40 mg at 05/15/25 2113     LORazepam injection 2 mg  2 mg Intravenous Q5 Min PRN Dale Linder MD        ondansetron disintegrating tablet 8 mg  8 mg Oral Q8H PRN Dale Linder MD   8 mg at 05/15/25 1706    sodium chloride 0.9% flush 10 mL  10 mL Intravenous PRN Dale Linder MD        venlafaxine 24 hr capsule 37.5 mg  37.5 mg Oral Daily Dale Linder MD   37.5 mg at 05/16/25 0822    venlafaxine 24 hr capsule 75 mg  75 mg Oral Daily Dale Linder MD   75 mg at 05/16/25 0821

## 2025-05-16 NOTE — ASSESSMENT & PLAN NOTE
"64F PMHx of HTN, pre-DM, CKD3, peripheral neuropathy on Gabapentin 300 mg qhs, asthma, depression/anxiety on Clonazepam 0.5 mg qhs, KHANH, and epilepsy since childhood currently maintained on Lacosamide 200 mg BID and Topiramate 150 mg BID referred to EMU by Dr. Worley for event capture and characterization of 1) "lord selby help me" followed by making sign of the cross, staring, falls to ground followed by confusion 2) nocturnal events associated with tongue bite. No EEG or MRI on file.    - Continuous vEEG   - Held home Lacosamide and Topiramate on admit; Clonazepam decreased to 0.25 mg qhs on admit  - LCM 11.8; TPM pending  - Plan to resume AEDs tonight @2300: IV Lacosamide 400 mg x1 followed by  mg BID and increase Topiramate to 150 mg BID  - Activation procedures per protocol- medication adjustments as above  - IV Ativan PRN for GTC greater than 5 min - please call epilepsy on call   - Seizure precautions, suction and oxygen at bedside  - Fall precautions, side rail padding in place  - Visi monitoring with continuous pulse oximetry   - Telemetry  - MRI Epilepsy Protocol as outpatient  - Has scheduled follow up with established neurologist, Dr. Worley on 5/29  - Outpatient virtual follow up in Epilepsy clinic with Dr. Garg to pursue surgical pathway  "

## 2025-05-16 NOTE — PROGRESS NOTES
EMU SEIZURE EVENT NOTE    Event button activated at 1136 for event presentation. Event lasted a few seconds per spouse. Patient responsive at bedside. See flowsheet for vitals.  Seizure precautions maintained, no signs of injury noted. Neurological assessment completed and VS monitored per protocol. VSS through event, see flowsheets for details.  Neurologically, patient is still postictal and able to follow commands. Patient and spouse instructed to call staff for assistance, verbalized understanding. Instructed patient/ spouse to press event button if patient has any further aura or event presentations, verbalized understanding. No acute signs of distress noted at this time.

## 2025-05-16 NOTE — PROCEDURES
EEG REPORT      Christie Marcum  [id number]  1960    DATE OF SERVICE: 5/15/2025    [facility] -2    METHODOLOGY      Extended electroencephalographic recording is made while the patient is ambulatory and continuing normal daily activities.  Electrodes are placed according to the International 10-20 placement system and included T1 and T2 electrode placement.  Twenty four (24) channels of digital signal (sampling rate of 512/sec) was simultaneously recorded from the scalp including EKG and eye monitors.  Recording band pass was 0.1 to 100 hz and all data was stored digitally on the recorder.  The patient is instructed to press an event button when clinical symptoms occur and write the symptoms into a diary. Activation procedures which include photic stimulation, hyperventilation and instructing patients to perform simple task are done in selected patients.        The EEG is displayed on a monitor screen and can be reformatted into different montages for evaluation.  The entire recoding is submitted for computer assisted analysis to detect spike and electrographic seizure activity.  The entire recording is visually reviewed and the times identified by computer analysis as being spikes or seizures are reviewed again.  Compresses spectral analysis (CSA) is also performed on the activity recorded from each individual channel.  This is displayed as a power display of frequencies from 0 to 30 Hz over time.   The CSA analysis is done and displayed continuously.  This is reviewed for asymmetries in power between homologous areas of the scalp and for presence of changes in power which canbe seen when seizures occur.  Sections of suspected abnormalities on the CSA is then compared with the original EEG recording.  .     [employer] software was also utilized in the review of this study.  This software suite analyzes the EEG recording in multiple domains.  Coherence and rhythmicity is computed to identify EEG sections which may  "contain organized seizures.  Each channel undergoes analysis to detect presence of spike and sharp waves which have special and morphological characteristic of epileptic activity.  The routine EEG recording is converted from spacial into frequency domain.  This is then displayed comparing homologous areas to identify areas of significant asymmetry.  Algorithm to identify non-cortically generated artifact is used to separate eye movement, EMG and other artifact from the EEG     Recording Times  Start on 5/15/2025  Stop on 5/16/2025    A total of 23:43:44 hours of EEG was recorded.      EEG FINDINGS:  Background activity:   The background rhythm was characterized by alpha and anterior dominant beta activity with a 10Hz posterior dominant alpha rhythm at 30-70 microvolts.   Symmetry and continuity: the background was continuous and symmetric     Sleep:   Normal sleep transients including sleep spindles, K complexes, vertex waves and POSTS were seen.    Activation procedures:   Photic stimulation was performed with no abnormalities seen  Hyperventilation was performed with no abnormalities seen    Abnormal activity:   Abundant sharp waves at F8.    Seizure 1 at 18:35  Patient is awake and holding phone with both hands at mid line when she says "I feel bad" prompting  to hit event howie.  She is poorly responsive to voice with no other semiology noted.   Just after the event howie EEG develops rhythmic theta at F8 with evolution lasting less than a minute.    Seizure 2 at 23:35  Patient is awake and at rest when she makes sign of cross with right hand then grimaces prompting  to hit event howie. No other semiology noted.  There is onset of rhythmic theta at F8, T4 just after hand gesture with evolution lasting less than a minute.    IMPRESSION:   Abnormal EEG due to seizure focus and two focal seizures in the right anterior temporal lobe.      Олег Garg MD  Neurology-Epilepsy.  [facility].

## 2025-05-16 NOTE — PLAN OF CARE
POC reviewed and updated with the patient/. Questions regarding POC were encouraged and addressed with the patient.  VSS, see flow-sheets. Tele and continuous pulse ox maintained per provider's order. Continuous EEG in place. Pt had seizure event around 2236.  reoriented to what button to push. Patient is AO X 4 at this time. Interruptions minimized to promote sleep. Seizure, fall, and safety precautions maintained, no signs of injury noted during shift.  Upon exiting room, patient's bed locked in low position, side rails up x 4, with call light within reach. Instructed patient/  to call staff for assistance, verbalized understanding.  No acute signs of distress noted at this time.

## 2025-05-17 VITALS
WEIGHT: 292.13 LBS | SYSTOLIC BLOOD PRESSURE: 120 MMHG | OXYGEN SATURATION: 98 % | HEIGHT: 65 IN | HEART RATE: 84 BPM | BODY MASS INDEX: 48.67 KG/M2 | TEMPERATURE: 98 F | DIASTOLIC BLOOD PRESSURE: 76 MMHG | RESPIRATION RATE: 18 BRPM

## 2025-05-17 LAB — TOPIRAMATE SERPL-MCNC: 9.8 MCG/ML

## 2025-05-17 PROCEDURE — 99900035 HC TECH TIME PER 15 MIN (STAT)

## 2025-05-17 PROCEDURE — 99233 SBSQ HOSP IP/OBS HIGH 50: CPT | Mod: FS,,, | Performed by: PSYCHIATRY & NEUROLOGY

## 2025-05-17 PROCEDURE — A4216 STERILE WATER/SALINE, 10 ML: HCPCS

## 2025-05-17 PROCEDURE — 94799 UNLISTED PULMONARY SVC/PX: CPT

## 2025-05-17 PROCEDURE — 25000003 PHARM REV CODE 250: Performed by: PHYSICIAN ASSISTANT

## 2025-05-17 PROCEDURE — 95718 EEG PHYS/QHP 2-12 HR W/VEEG: CPT | Mod: ,,, | Performed by: PSYCHIATRY & NEUROLOGY

## 2025-05-17 PROCEDURE — 25000003 PHARM REV CODE 250

## 2025-05-17 PROCEDURE — 94761 N-INVAS EAR/PLS OXIMETRY MLT: CPT

## 2025-05-17 PROCEDURE — 27000221 HC OXYGEN, UP TO 24 HOURS

## 2025-05-17 RX ORDER — CLONAZEPAM 0.25 MG/1
0.25 TABLET, ORALLY DISINTEGRATING ORAL ONCE
Status: COMPLETED | OUTPATIENT
Start: 2025-05-17 | End: 2025-05-17

## 2025-05-17 RX ORDER — IPRATROPIUM BROMIDE AND ALBUTEROL SULFATE 2.5; .5 MG/3ML; MG/3ML
3 SOLUTION RESPIRATORY (INHALATION) EVERY 12 HOURS PRN
Status: DISCONTINUED | OUTPATIENT
Start: 2025-05-17 | End: 2025-05-17 | Stop reason: HOSPADM

## 2025-05-17 RX ADMIN — TOPIRAMATE 150 MG: 100 TABLET, FILM COATED ORAL at 12:05

## 2025-05-17 RX ADMIN — LACOSAMIDE 200 MG: 100 TABLET, FILM COATED ORAL at 08:05

## 2025-05-17 RX ADMIN — VENLAFAXINE HYDROCHLORIDE 75 MG: 75 CAPSULE, EXTENDED RELEASE ORAL at 08:05

## 2025-05-17 RX ADMIN — Medication 10 ML: at 08:05

## 2025-05-17 RX ADMIN — TOPIRAMATE 150 MG: 100 TABLET, FILM COATED ORAL at 08:05

## 2025-05-17 RX ADMIN — VENLAFAXINE HYDROCHLORIDE 37.5 MG: 37.5 CAPSULE, EXTENDED RELEASE ORAL at 08:05

## 2025-05-17 RX ADMIN — HYDROXYZINE PAMOATE 50 MG: 25 CAPSULE ORAL at 08:05

## 2025-05-17 RX ADMIN — CLONAZEPAM 0.25 MG: 0.25 TABLET, ORALLY DISINTEGRATING ORAL at 12:05

## 2025-05-17 NOTE — PLAN OF CARE
[facility] - Neurosurgery (Hospital)  Discharge Final Note    Primary Care Provider: Mary Leon MD    Expected Discharge Date: 5/17/2025    Final Discharge Note (most recent)       Final Note - 05/17/25 1511          Final Note    Assessment Type Final Discharge Note     Anticipated Discharge Disposition Home or Self Care     What phone number can be called within the next 1-3 days to see how you are doing after discharge? [phone]     Hospital Resources/Appts/Education Provided Provided patient/caregiver with written discharge plan information;Appointments scheduled and added to AVS        Post-Acute Status    Patient choice form signed by patient/caregiver List with quality metrics by geographic area provided     Discharge Delays None known at this time                     Important Message from Medicare             Contact Info       Олег Garg MD   Specialty: Neurology    [address]  [facility] [address]   Phone: [phone]       Next Steps: Schedule an appointment as soon as possible for a visit    Instructions: Plan for outpatient pre-surgical epilepsy workup          Patient marked medically ready and is agreeable to discharge. Patient to discharge to home today and has transportation. All necessary follow up appointments have been scheduled and added to patient AVS. No other case management needs at this time.     Future Appointments   Date Time Provider Department Center   5/28/2025 11:00 AM Indira Wolrey MD [facility] [address] [place]   6/16/2025  3:15 PM Jd Carolina MD [facility] [place]   6/24/2025  1:30 PM Mary Leon MD [facility], MSW  Case Management  [facility]

## 2025-05-17 NOTE — ASSESSMENT & PLAN NOTE
Anxiety  - Chronic, continue home Venlafaxine  - Discussed plan to improve anxiety and reduce clonazepam; patient to follow up with outpatient psychiatry.  - Referral for outpatient psychiatry

## 2025-05-17 NOTE — SUBJECTIVE & OBJECTIVE
Interval History: PB x1. 2 seizures overnight. 1 right sided that did have a PB event and 1 subclinical left sided. Suspect amygdala origins. Discussed plan of care and answered questions at bedside.    Current Medications[1]  Continuous Infusions:    Review of Systems  Objective:     Vital Signs (Most Recent):  Temp: 98 °F (36.7 °C) (05/17/25 1116)  Pulse: 84 (05/17/25 1116)  Resp: 18 (05/17/25 1116)  BP: 120/76 (05/17/25 1116)  SpO2: 98 % (05/17/25 1116) Vital Signs (24h Range):  Temp:  [97.6 °F (36.4 °C)-98.6 °F (37 °C)] 98 °F (36.7 °C)  Pulse:  [71-89] 84  Resp:  [10-20] 18  SpO2:  [93 %-98 %] 98 %  BP: (109-173)/(67-81) 120/76     Weight: 132.5 kg (292 lb 1.8 oz)  Body mass index is 48.61 kg/m².     Physical Exam  Vitals reviewed.   Constitutional:       General: She is not in acute distress.     Appearance: She is not diaphoretic.   HENT:      Head: Normocephalic and atraumatic.      Comments: EEG in place  Eyes:      General: No scleral icterus.        Right eye: No discharge.         Left eye: No discharge.      Extraocular Movements: Extraocular movements intact and EOM normal.      Conjunctiva/sclera: Conjunctivae normal.      Pupils: Pupils are equal, round, and reactive to light.   Cardiovascular:      Rate and Rhythm: Normal rate.   Pulmonary:      Effort: Pulmonary effort is normal. No respiratory distress.   Musculoskeletal:         General: No swelling, tenderness or deformity.   Skin:     General: Skin is warm and dry.   Neurological:      General: No focal deficit present.      Mental Status: She is alert and oriented to person, place, and time. Mental status is at baseline.   Psychiatric:         Mood and Affect: Mood normal.         Speech: Speech normal.         Behavior: Behavior normal.            NEUROLOGICAL EXAMINATION:     MENTAL STATUS   Oriented to person, place, and time.   Attention: normal. Concentration: normal.   Speech: speech is normal   Level of consciousness: alert    CRANIAL  NERVES     CN II   Visual fields full to confrontation.     CN III, IV, VI   Pupils are equal, round, and reactive to light.  Extraocular motions are normal.     CN V   Right corneal reflex: normal  Left corneal reflex: normal    CN VII   Facial expression full, symmetric.     CN VIII   Hearing: intact    CN XI   CN XI normal.     CN XII   CN XII normal.       Significant Labs: All pertinent lab results from the past 24 hours have been reviewed.    Significant Studies: I have reviewed all pertinent imaging results/findings within the past 24 hours.       [1]   Current Facility-Administered Medications   Medication Dose Route Frequency Provider Last Rate Last Admin    acetaminophen tablet 650 mg  650 mg Oral Q4H PRN Dale Linder MD   650 mg at 05/14/25 2104    albuterol-ipratropium 2.5 mg-0.5 mg/3 mL nebulizer solution 3 mL  3 mL Nebulization Q12H PRN Олег Garg MD        amLODIPine tablet 5 mg  5 mg Oral QHS Dale Linder MD   5 mg at 05/16/25 2115    budesonide nebulizer solution 0.5 mg  0.5 mg Nebulization Q8H PRN Dale Linder MD        docusate sodium capsule 100 mg  100 mg Oral BID PRN Mari Gillespie PA-C        gabapentin capsule 300 mg  300 mg Oral QHS Dale Linder MD   300 mg at 05/16/25 2115    hydrOXYzine pamoate capsule 50 mg  50 mg Oral BID Dale Linder MD   50 mg at 05/17/25 0853    lacosamide tablet 200 mg  200 mg Oral Q12H Mari Gillespie PA-C   200 mg at 05/17/25 0852    latanoprost 0.005 % ophthalmic solution 1 drop  1 drop Both Eyes Nightly Dale Linder MD   1 drop at 05/16/25 2116    lisinopriL tablet 40 mg  40 mg Oral QHS Dale Linder MD   40 mg at 05/16/25 2115    LORazepam injection 2 mg  2 mg Intravenous Q5 Min PRN Dale Linder MD        ondansetron disintegrating tablet 8 mg  8 mg Oral Q8H PRN Dale Linder MD   8 mg at 05/15/25 1706    sodium chloride 0.9% flush 10 mL  10 mL Intravenous PRN Dale Linder MD   10 mL at 05/17/25 0881     topiramate tablet 150 mg  150 mg Oral BID Mari Gillespie PA-C   150 mg at 05/17/25 0852    venlafaxine 24 hr capsule 37.5 mg  37.5 mg Oral Daily Dale Linder MD   37.5 mg at 05/17/25 0852    venlafaxine 24 hr capsule 75 mg  75 mg Oral Daily Dale Linder MD   75 mg at 05/17/25 0853

## 2025-05-17 NOTE — DISCHARGE SUMMARY
"[facility] - Neurosurgery ([facility])  Neurology-Epilepsy  Discharge Summary      Patient Name: Christie Marcum  MRN: [id number]  Admission Date: 5/14/2025  Hospital Length of Stay: 3 days  Discharge Date and Time: 05/17/2025 1:20 PM  Attending Physician: Олег Garg MD   Discharging Provider: Dale Linder MD  Primary Care Physician: Mary Leon MD    HPI:   Christie Marcum is a 64 y.o. female with history of epilepsy, asthma, hypertension, lumbar radiculopathy, neuropathy, KHANH not on CPAP admitted to EMU for event capture and characterization. Patient reports her seizures started at 11 years old after being acutely ill and developed a resultant "scar" on her left brain. Since that hospitalization, she has been having seizures. She used to have 30-40 seizures a month, but currently her seizure frequency is about 8 every month. She follows with Dr. Worley for her epilepsy.     Event type 1 brief alteration in consciousness  Aura- "nervous feeling in stomach"  Event- "a few seconds" mumbling, quiet, dazed, will kick one or both legs (L>R), as a child she threw dolls at her sister  Postictal- 15 min confusion  Occurs 8x Monthly, last event May 2, 2025  Has had unknown total lifetime events    Event type 2 GTC  Aura- unknown  Event- shaking of limbs and LoC. Left tongue biting +/- urinary incontinence  Postictal- 30 min confusion  Occurs infrequently, last event unknown  Has had 100 total lifetime events    Triggers: loud Sounds, pain, sleep loss, caffeine, sweets (2 slices cake or 4 donuts)    Current AEDs:  - TPM 100mg BID  - LCS 200mg BID  - CZP 0.5mg QHS  Has not noticed side effects from current regimen. Reports adherence, last dose 5/14/2025 AM.    Prior medications and SE/reason for stopping:  Felbatol   Carbamazepine  Dilantin  Depakote  Phenobarbital  Keppra  Lamictal  Zonisamide  Lyrica  Clorazepate    Prior seizure evaluation:  - EEG none on file  - Patient reports remote [facility] " evaluation approximately ?20 years ago in which there were single seizure recorded after 6 days after her AEDs had been discontinued.   - MRI none on file but patient self-reports scar on left brain? affecting sound sensitivity    Family history negative for childhood epilepsy. Great maternal Uncle and mother both has seizures as older adults. Mom and dad both passed after strokes.      * No surgery found *     Indwelling Lines/Drains at time of discharge:   Lines/Drains/Airways       None                 Hospital Course:   5/14>5/15: Admit to EMU. Held home Lacosamide and Topiramate, Clonazepam decreased to 0.25 mg qhs. No typical events. EEG with right temporal sharps, no seizures. Proceed with provoking measures for event capture- HV/PS.  5/15>5/16: EEG with abundant right temporal sharps, 2 typical events c/w electroclinical seizures with right temporal onset. Continue for additional seizure capture. Additional PB @1142 for electroclinical seizure. Plan to resume AEDs tonight @2300: IV Lacosamide 400 mg x1 followed by  mg BID and increase Topiramate to 150 mg BID. Anticipate discharge tomorrow, 5/17.  5/16>5/17: Discharge home today. AEDs returned to home doses. Outpatient follow up with Dr. Worley and will also begin surgical planning workup with outpatient MRI to be scheduled Monday.    See EEG reports for details.    Interval History: PB x1. 2 seizures overnight. 1 right sided that did have a PB event and 1 subclinical left sided. Suspect amygdala origins. Discussed plan of care and answered questions at bedside.     Current Medications[1]      [1]            Current Facility-Administered Medications   Medication Dose Route Frequency Provider Last Rate Last Admin    acetaminophen tablet 650 mg  650 mg Oral Q4H PRN Dale Linder MD   650 mg at 05/14/25 2104    albuterol-ipratropium 2.5 mg-0.5 mg/3 mL nebulizer solution 3 mL  3 mL Nebulization Q12H PRN Олег Garg MD        amLODIPine tablet  5 mg  5 mg Oral QHS Dale Linder MD   5 mg at 05/16/25 2115    budesonide nebulizer solution 0.5 mg  0.5 mg Nebulization Q8H PRN Dale Linder MD        docusate sodium capsule 100 mg  100 mg Oral BID PRN Mari Gillespie PA-C        gabapentin capsule 300 mg  300 mg Oral QHS Dale Linder MD   300 mg at 05/16/25 2115    hydrOXYzine pamoate capsule 50 mg  50 mg Oral BID Dale Linder MD   50 mg at 05/17/25 0853    lacosamide tablet 200 mg  200 mg Oral Q12H Mari Gillespie PA-C   200 mg at 05/17/25 0852    latanoprost 0.005 % ophthalmic solution 1 drop  1 drop Both Eyes Nightly Dale Linder MD   1 drop at 05/16/25 2116    lisinopriL tablet 40 mg  40 mg Oral QHS Dale Linder MD   40 mg at 05/16/25 2115    LORazepam injection 2 mg  2 mg Intravenous Q5 Min PRN Dale Linder MD        ondansetron disintegrating tablet 8 mg  8 mg Oral Q8H PRN Dale Linder MD   8 mg at 05/15/25 1706    sodium chloride 0.9% flush 10 mL  10 mL Intravenous PRN Dale Linder MD   10 mL at 05/17/25 0852    topiramate tablet 150 mg  150 mg Oral BID Mari Gillespie PA-C   150 mg at 05/17/25 0852    venlafaxine 24 hr capsule 37.5 mg  37.5 mg Oral Daily Dale Linder MD   37.5 mg at 05/17/25 0852    venlafaxine 24 hr capsule 75 mg  75 mg Oral Daily Dale Linder MD   75 mg at 05/17/25 0853   Continuous Infusions:     Review of Systems  Objective:      Vital Signs (Most Recent):  Temp: 98 °F (36.7 °C) (05/17/25 1116)  Pulse: 84 (05/17/25 1116)  Resp: 18 (05/17/25 1116)  BP: 120/76 (05/17/25 1116)  SpO2: 98 % (05/17/25 1116) Vital Signs (24h Range):  Temp:  [97.6 °F (36.4 °C)-98.6 °F (37 °C)] 98 °F (36.7 °C)  Pulse:  [71-89] 84  Resp:  [10-20] 18  SpO2:  [93 %-98 %] 98 %  BP: (109-173)/(67-81) 120/76      Weight: 132.5 kg (292 lb 1.8 oz)  Body mass index is 48.61 kg/m².  Physical Exam  Vitals reviewed.   Constitutional:       General: She is not in acute distress.     Appearance: She is not diaphoretic.    HENT:      Head: Normocephalic and atraumatic.      Comments: EEG in place  Eyes:      General: No scleral icterus.        Right eye: No discharge.         Left eye: No discharge.      Extraocular Movements: Extraocular movements intact and EOM normal.      Conjunctiva/sclera: Conjunctivae normal.      Pupils: Pupils are equal, round, and reactive to light.   Cardiovascular:      Rate and Rhythm: Normal rate.   Pulmonary:      Effort: Pulmonary effort is normal. No respiratory distress.   Musculoskeletal:         General: No swelling, tenderness or deformity.   Skin:     General: Skin is warm and dry.   Neurological:      General: No focal deficit present.      Mental Status: She is alert and oriented to person, place, and time. Mental status is at baseline.   Psychiatric:         Mood and Affect: Mood normal.         Speech: Speech normal.         Behavior: Behavior normal.            NEUROLOGICAL EXAMINATION:      MENTAL STATUS   Oriented to person, place, and time.   Attention: normal. Concentration: normal.   Speech: speech is normal   Level of consciousness: alert     CRANIAL NERVES      CN II   Visual fields full to confrontation.      CN III, IV, VI   Pupils are equal, round, and reactive to light.  Extraocular motions are normal.      CN V   Right corneal reflex: normal  Left corneal reflex: normal     CN VII   Facial expression full, symmetric.      CN VIII   Hearing: intact     CN XI   CN XI normal.      CN XII   CN XII normal.         Significant Labs: All pertinent lab results from the past 24 hours have been reviewed.     Significant Studies: I have reviewed all pertinent imaging results/findings within the past 24 hours.  Goals of Care Treatment Preferences:  Code Status: Full Code      Consults:     Significant Labs: All pertinent lab results from the past 24 hours have been reviewed.    Significant Studies: I have reviewed all pertinent imaging results/findings within the past 24 hours.    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] 07/05/2022 Yes    Stage 3a chronic kidney disease [N18.31] 12/27/2023 Yes    Peripheral polyneuropathy [G62.9] 09/20/2023 Yes    Moderate persistent asthma without complication [J45.40] 08/25/2022 Yes    Depressive disorder [F32.A] 07/05/2022 Yes    KHANH (obstructive sleep apnea) [G47.33] 07/05/2022 Yes    Hypertension [I10] 07/05/2022 Yes      Problems Resolved During this Admission:       Neuro  * Focal epilepsy  64F PMHx of HTN, pre-DM, CKD3, peripheral neuropathy on Gabapentin 300 mg qhs, asthma, depression/anxiety on Clonazepam 0.5 mg qhs, KHANH, and epilepsy since childhood currently maintained on Lacosamide 200 mg BID and Topiramate 150 mg BID referred to EMU by Dr. Worley for event capture and characterization of 1) "lord hernandezs help me" followed by making sign of the cross, staring, falls to ground followed by confusion 2) nocturnal events associated with tongue bite. No EEG or MRI on file.    - Continuous vEEG   - Held home Lacosamide and Topiramate on admit; Clonazepam decreased to 0.25 mg qhs on admit  - LCM 11.8; TPM 9.8  - EKG CA interval 172 ms  - Plan to resume AEDs tonight @2300: IV Lacosamide 400 mg x1 followed by  mg BID and increase Topiramate to 150 mg BID and resume CZP 0.5mg QHS  - No prescriptions for AED needed as she was taking TPM lower than prescribed.  - Activation procedures per protocol- medication adjustments as above  - IV Ativan PRN for GTC greater than 5 min - please call epilepsy on call   - Seizure precautions, suction and oxygen at bedside  - Fall precautions, side rail padding in place  - Continuous pulse oximetry   - Telemetry  - MRI " Epilepsy Protocol as outpatient, to be called by MA on Monday.  - Has scheduled follow up with established neurologist, Dr. Worley on 5/29  - Outpatient virtual follow up in Epilepsy clinic with Dr. Garg to pursue surgical pathway    Psychiatric  Depressive disorder  Anxiety  - Chronic, continue home Venlafaxine  - Discussed plan to improve anxiety and reduce clonazepam; patient to follow up with outpatient psychiatry.  - Referral for outpatient psychiatry        Discharged Condition: stable    Disposition: Home or Self Care    Follow Up:   Follow-up Information       Олег Garg MD. Schedule an appointment as soon as possible for a visit.    Specialty: Neurology  Why: Plan for outpatient pre-surgical epilepsy workup  Contact information:  [address] [facility] [id number]  [phone]                           Patient Instructions:      Ambulatory referral/consult to Sleep Disorders   Standing Status: Future   Referral Priority: Routine Referral Type: Consultation   Requested Specialty: Sleep Medicine   Number of Visits Requested: 1     Ambulatory referral/consult to Psychiatry   Standing Status: Future   Referral Priority: Routine Referral Type: Psychiatric   Referral Reason: Specialty Services Required   Requested Specialty: Psychiatry   Number of Visits Requested: 1       Medications:  Reconciled Home Medications:      Medication List        CONTINUE taking these medications      albuterol-ipratropium 2.5 mg-0.5 mg/3 mL nebulizer solution  Commonly known as: DUO-NEB  PLEASE SEE ATTACHED FOR DETAILED DIRECTIONS     amLODIPine 5 MG tablet  Commonly known as: NORVASC  Take 2 tablets (10 mg total) by mouth once daily.     budesonide 0.5 mg/2 mL nebulizer solution  Commonly known as: PULMICORT  Take 0.5 mg by nebulization daily as needed.     clonazePAM 1 MG tablet  Commonly known as: KlonoPIN  Take 0.5 tablets (0.5 mg total) by mouth nightly as needed for Anxiety.     gabapentin 300 MG  capsule  Commonly known as: NEURONTIN  Take 1 capsule (300 mg total) by mouth every evening.     hydrOXYzine pamoate 25 MG Cap  Commonly known as: VISTARIL  Take 2 capsules (50 mg total) by mouth 2 (two) times daily.     lacosamide 200 mg Tab tablet  Commonly known as: VIMPAT  Take 1 tablet (200 mg total) by mouth every 12 (twelve) hours.     latanoprost 0.005 % ophthalmic solution  Place 1 drop into both eyes nightly.     levocetirizine 5 MG tablet  Commonly known as: XYZAL  Take 5 mg by mouth every evening.     lisinopriL 40 MG tablet  Commonly known as: PRINIVIL,ZESTRIL  Take 1 tablet (40 mg total) by mouth once daily.     miscellaneous medical supply Kit  1 each by Misc.(Non-Drug; Combo Route) route Daily. ROUND SHOWER STOOL; NEEDED FOR 99 MONTHS     nebulizer and compressor Eli  1 each by Misc.(Non-Drug; Combo Route) route every 6 (six) hours as needed (wheezing).     topiramate 100 MG tablet  Commonly known as: TOPAMAX  Take 1.5 tablets (150 mg total) by mouth 2 (two) times daily.     * venlafaxine 37.5 MG 24 hr capsule  Commonly known as: EFFEXOR-XR  Take 1 capsule (37.5 mg total) by mouth once daily. To take with 75mg once daily     * venlafaxine 75 MG 24 hr capsule  Commonly known as: EFFEXOR-XR  Take 1 capsule (75 mg total) by mouth once daily.           * This list has 2 medication(s) that are the same as other medications prescribed for you. Read the directions carefully, and ask your doctor or other care provider to review them with you.                Time spent on the discharge of patient: 33 minutes    Dale Linder MD  Neurology-Epilepsy  [facility] - Neurosurgery ([facility])

## 2025-05-17 NOTE — PLAN OF CARE
Problem: Adult Inpatient Plan of Care  Goal: Plan of Care Review  Outcome: Progressing  Goal: Patient-Specific Goal (Individualized)  Outcome: Progressing  Goal: Absence of Hospital-Acquired Illness or Injury  Outcome: Progressing  Goal: Optimal Comfort and Wellbeing  Outcome: Progressing  Goal: Readiness for Transition of Care  Outcome: Progressing     Problem: Bariatric Environmental Safety  Goal: Safety Maintained with Care  Outcome: Progressing       POC reviewed and updated with the patient/spouse. Questions regarding POC were encouraged and addressed with the patient.  VSS, see flow-sheets. Tele and continuous pulse ox maintained per provider's order. Continuous EEG in place. NAEON. Patient is AO X 4 at this time. Interruptions minimized to promote sleep. Seizure, fall, and safety precautions maintained, no signs of injury noted during shift. Upon exiting room, patient's bed locked in low position, side rails up x 4, with call light within reach. Instructed patient/ spouse to call staff for assistance, verbalized understanding.  No acute signs of distress noted at this time.

## 2025-05-17 NOTE — PROGRESS NOTES
Provider placed order for discharge at 1331. Patient to be  discharged to Discharge Destination: Home.   Transport set up via Discharge transportation: family.  AVS printed for patient and discharge instructions reviewed with the patient/ at the bedside. Questions regarding discharge instructions and new medications were encouraged/addressed. Patient/  verbalized/demonstrated understanding of D/C instructions and medication instructions. Tele-box removed from patient at 1345, cleaned and returned to unit secretary per unit protocol. All personal belongings at the bedside placed in patient belongings bag and given to patient/.  Bedside RX selected and medications delivered to patient prior to discharge. All PIVs/drains/equipment removed from patient and documented in chart per policy. VSS, see flowsheets for details. No acute signs of distress noted upon leaving the floor. AVS confirmation completed and unit secretary notified of patient's departure.

## 2025-05-17 NOTE — PROCEDURES
EMU Report    DATE OF PROCEDURE:  5/17/25     EEG NUMBER:  [facility] -4     REFERRING PHYSICIAN: Dr. Briceno     This EEG was performed to characterize the patient's events.     ELECTROENCEPHALOGRAM REPORT     METHODOLOGY:  Electroencephalographic (EEG) recording is recorded with electrodes placed according to the International 10-20 placement system.  Thirty two (32) channels of digital signal (sampling rate of 512/sec), including T1 and T2, were simultaneously recorded from the scalp and may include EKG, EMG, and/or eye monitors.  Recording band pass was 0.1 to 512 Hz.  Digital video recording of the patient is simultaneously recorded with the EEG.  The patient is instructed to report clinical symptoms which may occur during the recording session.  EEG and video recording are stored and archived in digital format.    Activation procedures, which include photic stimulation, hyperventilation and instructing patients to perform simple tasks, are done in selected patients.   The EEG is displayed on a monitor screen and can be reviewed using different montages.  Computer assisted-analysis is employed to detect spike and electrographic seizure activity.   The entire record is submitted for computer analysis.  The entire recording is visually reviewed, and the times identified by computer analysis as being spikes or seizures are reviewed again.    Compressed spectral analysis (CSA) is also performed on the activity recorded from each individual channel.  This is displayed as a power display of frequencies from 0 to 30 Hz over time.   The CSA is reviewed looking for asymmetries in power between homologous areas of the scalp, then compared with the original EEG recording.    [employer] software was also utilized in the review of this study.  This software suite analyzes the EEG recording in multiple domains.  Coherence and rhythmicity are computed to identify EEG sections which may contain organized seizures.  Each channel  undergoes analysis to detect the presence of spike and sharp waves which have special and morphological characteristics of epileptic activity.  The routine EEG recording is converted from special into frequency domain.  This is then displayed comparing homologous areas to identify areas of significant asymmetry.  Algorithm to identify non-cortically generated artifact is used to separate artifact from the EEG.       RECORDING TIMES:  Start on May 17, 2025 at hours 7 minute 0 seconds 55   End on May 17, 2025 at hours 12 minute 32 seconds 41  The total time of EEG recording for the study was 5 hours and 31 minutes    SEIZURES RECORDED:  During this recording no seizures were recorded     ELECTROENCEPHALOGRAM:  Interictal:  The recording was obtained with a number of standard bipolar and referential montages during wakefulness, drowsiness.  In the alert state, the posterior background rhythm was a symmetric, well-modulated 10 Hz alpha rhythm, which reacted symmetrically to eye opening.  Activation procedures were not performed.  Intermittent sharp waves were noted maximally at F7 T3.  Frequent sharp waves were noted maximally at F8 T4-T6.   Independent left and right temporal focal mixed delta range slowing was noted.  During drowsiness, the background rhythm waxed and waned and there were periods of slowing.      Ictal:   No seizures were recorded.    EKG: The EKG channeled revealed normal sinus rhythm     FINAL SUMMARY:  ELECTROENCEPHALOGRAM:  Interictal:  This is an abnormal EEG during wakefulness, drowsiness.  Independent left and right temporal focal mixed delta range slowing was noted.  Frequent sharp waves were noted maximally at F8 T4-T6 which occurred in brief runs.      Ictal:  No seizures were recorded  CLINICAL SEIZURE:  Classification:  Focal epilepsy   Lateralization:  Left and right hemisphere  Localization independent left frontotemporal and right frontotemporal region.     CLINICAL CORRELATION:  The  patient is a study 64-year-old female with a history of epilepsy who is currently maintained on lacosamide, topiramate and clonazepam.  This is an abnormal EEG during wakefulness, drowsiness and sleep.  The presence of independent left right temporal focal slowing suggestive focal neural dysfunction in these regions.  The presence of independent sharp waves in the frontotemporal regions bilaterally is suggestive of increased risk for focal seizures from these regions.  The burden was greater in the right hemisphere.  During this study no seizures were recorded

## 2025-05-17 NOTE — PROCEDURES
EMU Report    DATE OF PROCEDURE:  5/16/25     EEG NUMBER:  U -[id number]     REFERRING PHYSICIAN: Dr. Briceno     This EEG was performed to characterize the patient's events.     ELECTROENCEPHALOGRAM REPORT     METHODOLOGY:  Electroencephalographic (EEG) recording is recorded with electrodes placed according to the International 10-20 placement system.  Thirty two (32) channels of digital signal (sampling rate of 512/sec), including T1 and T2, were simultaneously recorded from the scalp and may include EKG, EMG, and/or eye monitors.  Recording band pass was 0.1 to 512 Hz.  Digital video recording of the patient is simultaneously recorded with the EEG.  The patient is instructed to report clinical symptoms which may occur during the recording session.  EEG and video recording are stored and archived in digital format.    Activation procedures, which include photic stimulation, hyperventilation and instructing patients to perform simple tasks, are done in selected patients.   The EEG is displayed on a monitor screen and can be reviewed using different montages.  Computer assisted-analysis is employed to detect spike and electrographic seizure activity.   The entire record is submitted for computer analysis.  The entire recording is visually reviewed, and the times identified by computer analysis as being spikes or seizures are reviewed again.    Compressed spectral analysis (CSA) is also performed on the activity recorded from each individual channel.  This is displayed as a power display of frequencies from 0 to 30 Hz over time.   The CSA is reviewed looking for asymmetries in power between homologous areas of the scalp, then compared with the original EEG recording.    [employer] software was also utilized in the review of this study.  This software suite analyzes the EEG recording in multiple domains.  Coherence and rhythmicity are computed to identify EEG sections which may contain organized seizures.  Each channel  undergoes analysis to detect the presence of spike and sharp waves which have special and morphological characteristics of epileptic activity.  The routine EEG recording is converted from special into frequency domain.  This is then displayed comparing homologous areas to identify areas of significant asymmetry.  Algorithm to identify non-cortically generated artifact is used to separate artifact from the EEG.       RECORDING TIMES:   Recording times   Start on May 16, 2025 at hours 7 minute 1 seconds 14   End on May 17, 2025 at hours 7 minute 0 seconds 0   The total time of EEG recording for the study was 23 hours and 36 minutes    SEIZURES RECORDED:  During this recording 2 seizures were recorded.  Seizure 1 was recorded from hour 11 minute 42 seconds 2 to hour 11 minute 42 seconds 34.  Seizure 2. Was recorded from hours 16 minute 15 seconds 8 hours 16 minute 15 seconds 36.  Recorded     ELECTROENCEPHALOGRAM:  Interictal:  The recording was obtained with a number of standard bipolar and referential montages during wakefulness, drowsiness and sleep.  In the alert state, the posterior background rhythm was a symmetric, well-modulated 10 Hz alpha rhythm, which reacted symmetrically to eye opening.  Activation procedures were not performed.  Intermittent sharp waves were noted maximally at F7 T3.  Frequent sharp waves were noted maximally at F8 T4-T6.  At times these occurred in brief runs with a frequency of 1-1.5 seconds.  Bursts of low-amplitude beta and gamma activity were noted in the temporal occipital region bilateral associated with a sharp waves during sleep.  Independent left and right temporal focal mixed delta range slowing was noted.  During drowsiness, the background rhythm waxed and waned and there were periods of slowing. During stage II sleep, symmetric V waves, K complexes and sleep spindles were noted.     Ictal:   seizure 1 was recorded during wakefulness.  Electrographically the onset was  characterized by bursts of irregular sharply contoured focal slowing and sharp wave maximum in the right temporal region with a field involving the right frontocentral region.  This was followed by slight decrement of the background followed by buildup of rhythmical theta activity in the right frontotemporal central region which evolved in rhythmicity in morphology.  Rapid spread to the contralateral hemisphere was noted.  The end was characterized by return to interictal background.  Clinically the patient had a notable change in speech with garbled speech and subsequent speech arrest.  Patient also had a behavioral manifestation characterized by formation of a ritual cross 3 seconds after electrographic onset of the seizure.  Subsequently she was notably aphasic and had left upper extremity automatisms.  Postictal delayed responses and speech delays were noted.   Seizure 2. Was recorded during wakefulness.  Electrographically the seizure was characterized by buildup of rhythmical theta activity in the left frontotemporal and frontal central regions which evolved in rhythmicity in morphology and was restricted to the left hemisphere.  The seizure was subclinical.    EKG: The EKG channeled revealed normal sinus rhythm     FINAL SUMMARY:  ELECTROENCEPHALOGRAM:  Interictal:  This is an abnormal EEG during wakefulness, drowsiness and sleep.  Independent left and right temporal focal mixed delta range slowing was noted.  Frequent sharp waves were noted maximally at F8 T4-T6 which occurred in brief runs.  At times there were associated with low-amplitude beta and gamma activity.  Infrequent sharp waves were noted maximally at F7 T3.     Ictal:  During this recording two electrographic seizures were recorded.  One seizure emanating from the right temporal region.  One seizure emanated from the left frontotemporal region.     CLINICAL SEIZURE:  Classification:  Focal epilepsy   Lateralization:  Left and right  hemisphere  Localization independent left frontotemporal and right frontotemporal region.     CLINICAL CORRELATION:  The patient is a study 64-year-old female with a history of epilepsy who is currently maintained on lacosamide, topiramate and clonazepam.  This is an abnormal EEG during wakefulness, drowsiness and sleep.  The presence of independent left right temporal focal slowing suggestive focal neural dysfunction in these regions.  The presence of independent sharp waves in the frontotemporal regions bilaterally is suggestive of increased risk for focal seizures from these regions.  The burden was greater in the right hemisphere.  During this study 2 electrographic seizures were recorded.  One seizure emanated from the left frontotemporal region.  The seizure was subclinical.  Another seizure emanated from the right frontotemporal region with spread to the contralateral hemisphere.  During this seizure patient reported sudden onset of anxiety and was associated with speech delay.  In response to the anxiety she has stereotypical behavior of forming a cross (Islam ritual).

## 2025-05-17 NOTE — ASSESSMENT & PLAN NOTE
"64F PMHx of HTN, pre-DM, CKD3, peripheral neuropathy on Gabapentin 300 mg qhs, asthma, depression/anxiety on Clonazepam 0.5 mg qhs, KHANH, and epilepsy since childhood currently maintained on Lacosamide 200 mg BID and Topiramate 150 mg BID referred to EMU by Dr. Worley for event capture and characterization of 1) "lord selby help me" followed by making sign of the cross, staring, falls to ground followed by confusion 2) nocturnal events associated with tongue bite. No EEG or MRI on file.    - Continuous vEEG   - Held home Lacosamide and Topiramate on admit; Clonazepam decreased to 0.25 mg qhs on admit  - LCM 11.8; TPM 9.8  - EKG MD interval 172 ms  - Plan to resume AEDs tonight @2300: IV Lacosamide 400 mg x1 followed by  mg BID and increase Topiramate to 150 mg BID and resume CZP 0.5mg QHS  - No prescriptions for AED needed as she was taking TPM lower than prescribed.  - Activation procedures per protocol- medication adjustments as above  - IV Ativan PRN for GTC greater than 5 min - please call epilepsy on call   - Seizure precautions, suction and oxygen at bedside  - Fall precautions, side rail padding in place  - Continuous pulse oximetry   - Telemetry  - MRI Epilepsy Protocol as outpatient, to be called by MA on Monday.  - Has scheduled follow up with established neurologist, Dr. Worley on 5/29  - Outpatient virtual follow up in Epilepsy clinic with Dr. Garg to pursue surgical pathway  "

## 2025-05-19 PROBLEM — R56.9 SEIZURE: Status: ACTIVE | Noted: 2025-05-19

## 2025-05-20 ENCOUNTER — TELEPHONE (OUTPATIENT)
Dept: ADMINISTRATIVE | Facility: CLINIC | Age: 65
End: 2025-05-20
Payer: MEDICARE

## 2025-05-20 DIAGNOSIS — R56.9 SEIZURE: ICD-10-CM

## 2025-05-20 RX ORDER — LACOSAMIDE 200 MG/1
200 TABLET ORAL EVERY 12 HOURS
Qty: 60 TABLET | Refills: 5 | Status: SHIPPED | OUTPATIENT
Start: 2025-05-20

## 2025-05-20 NOTE — TELEPHONE ENCOUNTER
Type:  RX Refill Request    Who Called:  Sophie Marcum  Refill or New Rx: refill   RX Name and Strength: Lacosmide 200 mg tabs #60  How is the patient currently taking it? (ex. 1XDay): 1 tabs q12h   Is this a 30 day or 90 day RX: 60  Preferred [facility] with phone number: [facility] [place], [place] [phone]  Local or Mail Order: local   Ordering Provider: Philly Briceno  Would the patient rather a call back or a response via [facility]? NO  Best Call Back Number: [phone]  Additional Information:  Pt is requesting Lacosamide 200 mg. She will be with them indefinitely and  was unable to get it refilled before going into the hospital.The pharmacist there states that they are unable to transfer from their [place] location and a new prescription will need to be sent directly to their store.

## 2025-05-20 NOTE — TELEPHONE ENCOUNTER
"Phoned patient in response to reply of "2" to post-discharge texting tracker. Ms. Marcum is currently in the [place], [place] area with a relative recovering from her most recent hospitalization (discharged on 5/17). She will be with them indefinitely and is without her lacosamide 200 mg. She was unable to get it refilled before going into the hospital. It wasn't due at the time, but has since been filled at the [place] store on 5/19. She is unable to pick it up since she is in the [facility]. Ms. Marcum is requesting that a prescription for the lacosamide 200 mg #60 1 tablet q12h be sent to the [facility] in [place], [place]. The pharmacist there states that they are unable to transfer from their [place] location and a new prescription will need to be sent directly to their store. The pharmacy has been added to Ms. Marcum's pharmacy list. Message sent to Philly Briceno NP.           "

## 2025-05-22 LAB — MAYO GENERIC ORDERABLE RESULT: NORMAL

## 2025-05-26 ENCOUNTER — TELEPHONE (OUTPATIENT)
Dept: ADMINISTRATIVE | Facility: CLINIC | Age: 65
End: 2025-05-26
Payer: MEDICARE

## 2025-05-26 ENCOUNTER — PATIENT OUTREACH (OUTPATIENT)
Dept: ADMINISTRATIVE | Facility: OTHER | Age: 65
End: 2025-05-26
Payer: MEDICARE

## 2025-05-26 DIAGNOSIS — G40.209 PARTIAL SYMPTOMATIC EPILEPSY WITH COMPLEX PARTIAL SEIZURES, NOT INTRACTABLE, WITHOUT STATUS EPILEPTICUS: ICD-10-CM

## 2025-05-27 RX ORDER — CLONAZEPAM 1 MG/1
0.5 TABLET ORAL NIGHTLY PRN
Qty: 15 TABLET | Refills: 2 | Status: SHIPPED | OUTPATIENT
Start: 2025-05-27 | End: 2025-05-28

## 2025-05-28 ENCOUNTER — OFFICE VISIT (OUTPATIENT)
Dept: NEUROLOGY | Facility: CLINIC | Age: 65
End: 2025-05-28
Payer: MEDICARE

## 2025-05-28 VITALS
DIASTOLIC BLOOD PRESSURE: 80 MMHG | BODY MASS INDEX: 45.15 KG/M2 | WEIGHT: 271 LBS | HEIGHT: 65 IN | SYSTOLIC BLOOD PRESSURE: 144 MMHG

## 2025-05-28 DIAGNOSIS — G40.109 FOCAL EPILEPSY: Primary | ICD-10-CM

## 2025-05-28 PROCEDURE — 1111F DSCHRG MED/CURRENT MED MERGE: CPT | Mod: CPTII,S$GLB,, | Performed by: PSYCHIATRY & NEUROLOGY

## 2025-05-28 PROCEDURE — 3008F BODY MASS INDEX DOCD: CPT | Mod: CPTII,S$GLB,, | Performed by: PSYCHIATRY & NEUROLOGY

## 2025-05-28 PROCEDURE — 99214 OFFICE O/P EST MOD 30 MIN: CPT | Mod: S$GLB,,, | Performed by: PSYCHIATRY & NEUROLOGY

## 2025-05-28 PROCEDURE — 1159F MED LIST DOCD IN RCRD: CPT | Mod: CPTII,S$GLB,, | Performed by: PSYCHIATRY & NEUROLOGY

## 2025-05-28 PROCEDURE — 3044F HG A1C LEVEL LT 7.0%: CPT | Mod: CPTII,S$GLB,, | Performed by: PSYCHIATRY & NEUROLOGY

## 2025-05-28 PROCEDURE — 4010F ACE/ARB THERAPY RXD/TAKEN: CPT | Mod: CPTII,S$GLB,, | Performed by: PSYCHIATRY & NEUROLOGY

## 2025-05-28 PROCEDURE — 99999 PR PBB SHADOW E&M-EST. PATIENT-LVL III: CPT | Mod: PBBFAC,,, | Performed by: PSYCHIATRY & NEUROLOGY

## 2025-05-28 PROCEDURE — 3077F SYST BP >= 140 MM HG: CPT | Mod: CPTII,S$GLB,, | Performed by: PSYCHIATRY & NEUROLOGY

## 2025-05-28 PROCEDURE — 3079F DIAST BP 80-89 MM HG: CPT | Mod: CPTII,S$GLB,, | Performed by: PSYCHIATRY & NEUROLOGY

## 2025-05-28 RX ORDER — CLONAZEPAM 0.25 MG/1
TABLET, ORALLY DISINTEGRATING ORAL
Qty: 30 TABLET | Refills: 5 | Status: SHIPPED | OUTPATIENT
Start: 2025-05-28

## 2025-05-28 RX ORDER — BUDESONIDE AND FORMOTEROL FUMARATE DIHYDRATE 160; 4.5 UG/1; UG/1
AEROSOL RESPIRATORY (INHALATION)
COMMUNITY

## 2025-05-28 NOTE — PROGRESS NOTES
"Chief Complaint   Patient presents with    F/U SZ.      Was in hospital and had a Sz.  Wants EEG results. No Sz since her hospital stay.         This is a 64 y.o. female here for follow up for seizures. Recent EMU admission at [place]    "5/14>5/15: Admit to EMU. Held home Lacosamide and Topiramate, Clonazepam decreased to 0.25 mg qhs. No typical events. EEG with right temporal sharps, no seizures. Proceed with provoking measures for event capture- HV/PS.  5/15>5/16: EEG with abundant right temporal sharps, 2 typical events c/w electroclinical seizures with right temporal onset. Continue for additional seizure capture. Additional PB @1142 for electroclinical seizure. Plan to resume AEDs tonight @2300: IV Lacosamide 400 mg x1 followed by  mg BID and increase Topiramate to 150 mg BID. Anticipate discharge tomorrow, 5/17.  5/16>5/17: Discharge home today. AEDs returned to home doses. Outpatient follow up with Dr. Worley and will also begin surgical planning workup with outpatient MRI to be scheduled Monday."     Patient denies any seizures since discharge.  She is taking Topamax 100 mg twice daily Vimpat 200 mg twice daily and clonazepam 0.5 at night.  She was told to try to wean off the clonazepam but not given a wean schedule.  Patient is unsure about surgical planning and resultant surgery.   will need to think about it.    Medication List with Changes/Refills   New Medications    CLONAZEPAM (KLONOPIN) 0.25 MG TBDL    Take nightly for 2 months, then every other night for 2 months, then every 3rd night for 2 months then stop   Current Medications    ALBUTEROL-IPRATROPIUM (DUO-NEB) 2.5 MG-0.5 MG/3 ML NEBULIZER SOLUTION    PLEASE SEE ATTACHED FOR DETAILED DIRECTIONS    AMLODIPINE (NORVASC) 5 MG TABLET    Take 2 tablets (10 mg total) by mouth once daily.    BUDESONIDE (PULMICORT) 0.5 MG/2 ML NEBULIZER SOLUTION    Take 0.5 mg by nebulization daily as needed.    GABAPENTIN (NEURONTIN) 300 MG CAPSULE    Take 1 " capsule (300 mg total) by mouth every evening.    HYDROXYZINE PAMOATE (VISTARIL) 25 MG CAP    Take 2 capsules (50 mg total) by mouth 2 (two) times daily.    LACOSAMIDE (VIMPAT) 200 MG TAB TABLET    Take 1 tablet (200 mg total) by mouth every 12 (twelve) hours.    LATANOPROST 0.005 % OPHTHALMIC SOLUTION    Place 1 drop into both eyes nightly.    LEVOCETIRIZINE (XYZAL) 5 MG TABLET    Take 5 mg by mouth every evening.    LISINOPRIL (PRINIVIL,ZESTRIL) 40 MG TABLET    Take 1 tablet (40 mg total) by mouth once daily.    MISCELLANEOUS MEDICAL SUPPLY KIT    1 each by Misc.(Non-Drug; Combo Route) route Daily. ROUND SHOWER STOOL; NEEDED FOR 99 MONTHS    NEBULIZER AND COMPRESSOR TEETEE    1 each by Misc.(Non-Drug; Combo Route) route every 6 (six) hours as needed (wheezing).    SYMBICORT 160-4.5 MCG/ACTUATION HFAA    Inhale into the lungs.    TOPIRAMATE (TOPAMAX) 100 MG TABLET    Take 1.5 tablets (150 mg total) by mouth 2 (two) times daily.    VENLAFAXINE (EFFEXOR-XR) 37.5 MG 24 HR CAPSULE    Take 1 capsule (37.5 mg total) by mouth once daily. To take with 75mg once daily    VENLAFAXINE (EFFEXOR-XR) 75 MG 24 HR CAPSULE    Take 1 capsule (75 mg total) by mouth once daily.   Discontinued Medications    CLONAZEPAM (KLONOPIN) 1 MG TABLET    Take 0.5 tablets (0.5 mg total) by mouth nightly as needed for Anxiety.        Vitals:    05/28/25 1052   BP: (!) 144/80        General: alert and oriented, no acute distress, no audible wheezes, pulse intact, no edema    Cognition and Comprehension  Speech and language intact  Follows commands  Speech fluent  Attention intact  Memory for recent events intact from history taking  Affect pleasant  Fund of knowledge adequate    Cranial nerves  II. Optic: Visual fields full to confrontation both eyes  III, IV, VI. Oculomotor: Intact, Pupils equal, round and reactive to light, no nystagmus  V. Trigeminal: sensation to light touch normal  VII. No facial asymmetry or facial weakness  VIII. Hearing  "intact to spoken voice  IX/X. Glossopharyngeal/Vagus: Voice normal, palate rises symmetrically  XI. Axillary: Shoulder shrug normal  XII. Hypoglossal: Intact    Muscle Strength and Tone  Normal upper extremity tone  Normal lower extremity tone  Normal upper extremity strength  Normal lower extremity strength      Coordination and Gait  Finger to nose normal  Gait normal     1. Focal epilepsy  Overview:  Has had seizures since the age of 13. Initial seizure was in the setting of acute illness with hydronephrosis and continued to have seizures frequently. Used to stare and throw dolls at her sister and did not know they were seizures.  SHe used to have 30-40 seizures a month. Currently seizure frequency is about 1 every 6 months. She is on vimpat 200 BID, topamax 200 BID. She also takes clorazepate, clonazepam 1 BID. She has 2 types of seizures. First type is characterized by starting with "lord sola help me" and she makes sign of cross, then stares, hits her leg, falls to ground if standing lasting for 1-2 minutes. has postictal confusion for 5 minutes to 15 minutes. Has never had a generalized tonic clonic seizure witnessed. Second type is nocturnal. She has had a seizure in her sleep and woke up with tongue bitten. Overall she is very happy with her seizure frequency currently as it is much improved. She does complain of short-term memory problems, which she attributes to the medications.      Orders:  -     clonazePAM (KLONOPIN) 0.25 MG TbDL; Take nightly for 2 months, then every other night for 2 months, then every 3rd night for 2 months then stop  Dispense: 30 tablet; Refill: 5         A wean schedule was given for clonazepam as requested by the patient.  Did warn her that she has been on some sort of benzodiazepine for many years, even prior to coming to me she was on high doses of clorazepate.  Recommend she wean over 6 months.      Would recommend surgical planning as recommended by the epileptologist  "

## 2025-06-05 ENCOUNTER — TELEPHONE (OUTPATIENT)
Dept: FAMILY MEDICINE | Facility: CLINIC | Age: 65
End: 2025-06-05
Payer: MEDICARE

## 2025-06-11 LAB
ALBUMIN SERPL-MCNC: 4 G/DL (ref 3.9–4.9)
ALP SERPL-CCNC: 79 IU/L (ref 44–121)
ALT SERPL-CCNC: 22 IU/L (ref 0–32)
AST SERPL-CCNC: 22 IU/L (ref 0–40)
BASOPHILS # BLD AUTO: 0.1 X10E3/UL (ref 0–0.2)
BASOPHILS NFR BLD AUTO: 1 %
BILIRUB SERPL-MCNC: 0.4 MG/DL (ref 0–1.2)
BUN SERPL-MCNC: 12 MG/DL (ref 8–27)
BUN/CREAT SERPL: 9 (ref 12–28)
CALCIUM SERPL-MCNC: 8.9 MG/DL (ref 8.7–10.3)
CHLORIDE SERPL-SCNC: 106 MMOL/L (ref 96–106)
CO2 SERPL-SCNC: 18 MMOL/L (ref 20–29)
CREAT SERPL-MCNC: 1.27 MG/DL (ref 0.57–1)
CREAT UR-MCNC: 166.8 MG/DL
EOSINOPHIL # BLD AUTO: 0.3 X10E3/UL (ref 0–0.4)
EOSINOPHIL NFR BLD AUTO: 4 %
ERYTHROCYTE [DISTWIDTH] IN BLOOD BY AUTOMATED COUNT: 12.6 % (ref 11.7–15.4)
EST. GFR  (NO RACE VARIABLE): 47 ML/MIN/1.73
GLOBULIN SER CALC-MCNC: 3.3 G/DL (ref 1.5–4.5)
GLUCOSE SERPL-MCNC: 134 MG/DL (ref 70–99)
HCT VFR BLD AUTO: 43.3 % (ref 34–46.6)
HGB BLD-MCNC: 13.2 G/DL (ref 11.1–15.9)
IMM GRANULOCYTES NFR BLD AUTO: 1 %
LYMPHOCYTES # BLD AUTO: 1.4 X10E3/UL (ref 0.7–3.1)
LYMPHOCYTES NFR BLD AUTO: 16 %
MCH RBC QN AUTO: 27.5 PG (ref 26.6–33)
MCHC RBC AUTO-ENTMCNC: 30.5 G/DL (ref 31.5–35.7)
MCV RBC AUTO: 90 FL (ref 79–97)
MONOCYTES # BLD AUTO: 0.8 X10E3/UL (ref 0.1–0.9)
MONOCYTES NFR BLD AUTO: 9 %
NEUTROPHILS # BLD AUTO: 6.2 X10E3/UL (ref 1.4–7)
NEUTROPHILS NFR BLD AUTO: 69 %
PLATELET # BLD AUTO: 230 X10E3/UL (ref 150–450)
POTASSIUM SERPL-SCNC: 4.1 MMOL/L (ref 3.5–5.2)
PROT SERPL-MCNC: 7.3 G/DL (ref 6–8.5)
PROT UR-MCNC: 16.7 MG/DL
PROT/CREAT UR: 100 MG/G CREAT (ref 0–200)
PTH-INTACT SERPL-MCNC: 42 PG/ML (ref 15–65)
RBC # BLD AUTO: 4.8 X10E6/UL (ref 3.77–5.28)
SODIUM SERPL-SCNC: 140 MMOL/L (ref 134–144)
SPECIMEN STATUS REPORT: NORMAL
WBC # BLD AUTO: 8.8 X10E3/UL (ref 3.4–10.8)

## 2025-06-15 DIAGNOSIS — R56.9 SEIZURE: ICD-10-CM

## 2025-06-16 RX ORDER — TOPIRAMATE 100 MG/1
150 TABLET, FILM COATED ORAL 2 TIMES DAILY
Qty: 270 TABLET | Refills: 3 | Status: SHIPPED | OUTPATIENT
Start: 2025-06-16

## 2025-06-20 DIAGNOSIS — R56.9 SEIZURE: ICD-10-CM

## 2025-06-20 RX ORDER — LACOSAMIDE 200 MG/1
200 TABLET ORAL EVERY 12 HOURS
Qty: 60 TABLET | Refills: 5 | Status: SHIPPED | OUTPATIENT
Start: 2025-06-20

## 2025-06-24 ENCOUNTER — OFFICE VISIT (OUTPATIENT)
Dept: FAMILY MEDICINE | Facility: CLINIC | Age: 65
End: 2025-06-24
Payer: MEDICARE

## 2025-06-24 VITALS
DIASTOLIC BLOOD PRESSURE: 80 MMHG | OXYGEN SATURATION: 97 % | HEART RATE: 83 BPM | BODY MASS INDEX: 45.23 KG/M2 | RESPIRATION RATE: 16 BRPM | HEIGHT: 65 IN | SYSTOLIC BLOOD PRESSURE: 128 MMHG | WEIGHT: 271.5 LBS | TEMPERATURE: 99 F

## 2025-06-24 DIAGNOSIS — J44.9 CHRONIC OBSTRUCTIVE PULMONARY DISEASE, UNSPECIFIED COPD TYPE: ICD-10-CM

## 2025-06-24 DIAGNOSIS — Z12.31 BREAST CANCER SCREENING BY MAMMOGRAM: ICD-10-CM

## 2025-06-24 DIAGNOSIS — C18.5 CANCER OF SPLENIC FLEXURE: ICD-10-CM

## 2025-06-24 DIAGNOSIS — F41.9 ANXIETY: ICD-10-CM

## 2025-06-24 DIAGNOSIS — Z00.00 WELLNESS EXAMINATION: ICD-10-CM

## 2025-06-24 DIAGNOSIS — I10 PRIMARY HYPERTENSION: Primary | ICD-10-CM

## 2025-06-24 DIAGNOSIS — R79.9 ABNORMAL FINDING OF BLOOD CHEMISTRY, UNSPECIFIED: ICD-10-CM

## 2025-06-24 DIAGNOSIS — E66.813 CLASS 3 SEVERE OBESITY DUE TO EXCESS CALORIES WITH SERIOUS COMORBIDITY AND BODY MASS INDEX (BMI) OF 45.0 TO 49.9 IN ADULT: ICD-10-CM

## 2025-06-24 DIAGNOSIS — N18.31 STAGE 3A CHRONIC KIDNEY DISEASE: ICD-10-CM

## 2025-06-24 DIAGNOSIS — G40.109 FOCAL EPILEPSY: ICD-10-CM

## 2025-06-24 DIAGNOSIS — E66.01 CLASS 3 SEVERE OBESITY DUE TO EXCESS CALORIES WITH SERIOUS COMORBIDITY AND BODY MASS INDEX (BMI) OF 45.0 TO 49.9 IN ADULT: ICD-10-CM

## 2025-06-24 PROCEDURE — 3044F HG A1C LEVEL LT 7.0%: CPT | Mod: CPTII,,, | Performed by: FAMILY MEDICINE

## 2025-06-24 PROCEDURE — 1159F MED LIST DOCD IN RCRD: CPT | Mod: CPTII,,, | Performed by: FAMILY MEDICINE

## 2025-06-24 PROCEDURE — 4010F ACE/ARB THERAPY RXD/TAKEN: CPT | Mod: CPTII,,, | Performed by: FAMILY MEDICINE

## 2025-06-24 PROCEDURE — 3008F BODY MASS INDEX DOCD: CPT | Mod: CPTII,,, | Performed by: FAMILY MEDICINE

## 2025-06-24 PROCEDURE — 3079F DIAST BP 80-89 MM HG: CPT | Mod: CPTII,,, | Performed by: FAMILY MEDICINE

## 2025-06-24 PROCEDURE — 1160F RVW MEDS BY RX/DR IN RCRD: CPT | Mod: CPTII,,, | Performed by: FAMILY MEDICINE

## 2025-06-24 PROCEDURE — 99214 OFFICE O/P EST MOD 30 MIN: CPT | Mod: ,,, | Performed by: FAMILY MEDICINE

## 2025-06-24 PROCEDURE — 3074F SYST BP LT 130 MM HG: CPT | Mod: CPTII,,, | Performed by: FAMILY MEDICINE

## 2025-06-24 RX ORDER — ALBUTEROL SULFATE 90 UG/1
INHALANT RESPIRATORY (INHALATION)
COMMUNITY
Start: 2025-06-23

## 2025-06-24 NOTE — PROGRESS NOTES
Subjective:        Patient ID: Christie Marcum is a 64 y.o. female.    Chief Complaint: Follow-up (6 month follow up chronic med conditions )      Very pleasant patient presents to clinic with her sister for chronic condition follow up. She is due for her wellness visit in December    Will be having cataract left 8/18/25 and then right 9/8/25- dr. Garcia.  No paperwork to complete today.      She c/o back pain.  Wears back brace. No cane.      She has KHANH on CPAP, COPD and emphysema.  Her pulmonologist is dr. Mortensen now.    Does not need O2. Reports compliance with her inhaler. Using nebs.      Has hypertension and is compliant with her medication. she is on lisinopril and norvasc qhs.  refuses asa    She has ckd.  Following with renal.        States got shingrix #1 4/20/2023 and started having increase in seizures after shot.  Does not plan on getting shingrix #2.      She has epilepsy. Last seizure on thanksgiving day.  Thinks albuterol set it off.  She is followed by neurology, Dr. Worley.  on vimpat, topamax, and klonopin.  Lov 5/2025. Will be weaning off klonopin with dr. Worley    Has seasonal allergies and is on allegra.      has anxiety/ depression and is on effexor and hydroxyzine. Asking for referral.       had macho khan at [facility] 7/26/2021 with dr. lorena cortes. gave norco and pantoprazole and made her seizures worse. once stopped meds, seizures stopped. she has followed up post op with him.      She has a history of cancer of the splenic flexure and surgery and reanastomosis. She is followed by Dr. Gillespie.  took mediport out 6/2021. Previously saw dr. doyle. he no longer takes her insurance. is now seeing dr. dheeraj yepez in [place].  Last colonsocopy with him 6/2021. Did egd and fibroscan with sivan schmidt     She has overactive bladder and urinary incontinence. Currently on myrbetriq.  Her urologist is Dr. Flor. has seen him in 2020. not currently on meds for her bladder.  not currently seeing him. The patient had hallucinations with oxybutynin. She tolerated Toviaz however she is having trouble with her insurance paying for this. They also discussed possible Botox injections.  She has ckd3.  Recent gfr is improved. Encourage fluids. Monitor.      has glaucoma. on drops. dr. perry at [facility] office.      She is obese.      Last pap smear 5/2019 and was normal and HPV negative. She reports a history of partial hysterectomy at age 29. She still has her ovaries. She has had abnormal mammogram on 5/7/19 with subsequent breast biopsy done at [facility] of bilateral breasts which showed fibroadenoma and stromal fibrosis. mmg 7/17/2020 at [facility] was normal. at her visit on 8/24/21 she reported felt lumps in right breast. states did mmg at [facility] 9/2021 which was normal. Mmg 10/2022 at [facility]. Need to request. Dexa done but need to request at [facility] as well.      She is ALLERGIC to Keflex . She is also ALLERGIC to Aleve, codeine, naproxen, tetracycline and Ultram . she reports a positive family history of diabetes in older sister.              Review of Systems   Constitutional: Negative.    HENT: Negative.     Respiratory: Negative.     Cardiovascular: Negative.    Gastrointestinal: Negative.    Genitourinary: Negative.    Musculoskeletal:  Positive for back pain.   Neurological:  Positive for seizures.         Review of patient's allergies indicates:   Allergen Reactions    Cephalexin      Other reaction(s): Seizure  hallucinations    Other reaction(s): Unknown    Ciprofloxacin      Other reaction(s): Seiurus  Other reaction(s): Unknown    Codeine      Other reaction(s): Seizure  seizure      Metronidazole      Other reaction(s): Seizure    Metronidazole hcl      Other reaction(s): Unknown    Naproxen      Other reaction(s): Seizure  Nausea/seizure      Pantoprazole sodium      Other reaction(s): Unknown    Penicillin Other (See Comments)    Tetracycline      Other reaction(s):  "Seizure  seizure      Tramadol Other (See Comments)     Other reaction(s): Seizure  SEIZURES HALLUCINATION        Vitals:    06/24/25 1313   BP: 128/80   BP Location: Left arm   Patient Position: Sitting   Pulse: 83   Resp: 16   Temp: 98.6 °F (37 °C)   TempSrc: Oral   SpO2: 97%   Weight: 123.2 kg (271 lb 8 oz)   Height: 5' 5" (1.651 m)      Social History     Socioeconomic History    Marital status:    Occupational History    Occupation: unemployed   Tobacco Use    Smoking status: Never     Passive exposure: Never    Smokeless tobacco: Never   Substance and Sexual Activity    Alcohol use: Not Currently    Drug use: Never     Social Drivers of Health     Financial Resource Strain: Low Risk  (5/15/2025)    Overall Financial Resource Strain (CARDIA)     Difficulty of Paying Living Expenses: Not hard at all   Recent Concern: Financial Resource Strain - High Risk (3/12/2025)    Received from [facility] SDOH Screening     In the past year, have you been unable to get any of the following when you really needed them? choose all that apply.: Medicine or health care   Food Insecurity: No Food Insecurity (5/15/2025)    Hunger Vital Sign     Worried About Running Out of Food in the Last Year: Never true     Ran Out of Food in the Last Year: Never true   Transportation Needs: No Transportation Needs (5/15/2025)    PRAPARE - Transportation     Lack of Transportation (Medical): No     Lack of Transportation (Non-Medical): No   Physical Activity: Inactive (5/15/2025)    Exercise Vital Sign     Days of Exercise per Week: 0 days     Minutes of Exercise per Session: 0 min   Stress: No Stress Concern Present (5/15/2025)    [facility] [place] [facility] [employer] - Occupational Stress Questionnaire     Feeling of Stress : Only a little   Housing Stability: Low Risk  (5/15/2025)    Housing Stability Vital Sign     Unable to Pay for Housing in the Last Year: No     Homeless in the Last Year: No      Family History "   Problem Relation Name Age of Onset    Hypertension Mother      Stroke Mother      Hypertension Father      Stroke Father      Hyperlipidemia Sister      Kidney disease Sister            Objective:     Physical Exam  Vitals and nursing note reviewed.   Constitutional:       Appearance: Normal appearance. She is obese.   HENT:      Head: Normocephalic and atraumatic.      Nose: Nose normal.      Mouth/Throat:      Mouth: Mucous membranes are moist.      Pharynx: Oropharynx is clear.   Eyes:      Extraocular Movements: Extraocular movements intact.   Cardiovascular:      Rate and Rhythm: Normal rate and regular rhythm.      Pulses: Normal pulses.      Heart sounds: Normal heart sounds.   Pulmonary:      Effort: Pulmonary effort is normal.      Breath sounds: Normal breath sounds.   Musculoskeletal:         General: Normal range of motion.      Cervical back: Normal range of motion.      Comments: Ambulates unassisted   Skin:     General: Skin is warm and dry.   Neurological:      General: No focal deficit present.      Mental Status: She is alert and oriented to person, place, and time. Mental status is at baseline.   Psychiatric:         Mood and Affect: Mood normal.       Medications Ordered Prior to Encounter[1]  Health Maintenance   Topic Date Due    Annual UACr  Never done    COVID-19 Vaccine (6 - 2024-25 season) 09/01/2024    Mammogram  01/12/2025    Shingles Vaccine (2 of 2) 06/24/2026 (Originally 6/15/2023)    Hemoglobin A1c (Prediabetes)  01/03/2026    Lipid Panel  01/03/2030    TETANUS VACCINE  09/09/2030    Colorectal Cancer Screening  06/10/2031    Hepatitis C Screening  Completed    Influenza Vaccine  Completed    HIV Screening  Completed    RSV Vaccine (Age 60+ and Pregnant patients)  Completed    Pneumococcal Vaccines (Age 50+)  Completed      Results for orders placed or performed in visit on 06/10/25   CBC/Diff Ambigious Default    Collection Time: 06/10/25  1:03 PM   Result Value Ref Range    WBC  8.8 3.4 - 10.8 x10E3/uL    RBC 4.80 3.77 - 5.28 x10E6/uL    Hemoglobin 13.2 11.1 - 15.9 g/dL    Hematocrit 43.3 34.0 - 46.6 %    MCV 90 79 - 97 fL    MCH 27.5 26.6 - 33.0 pg    MCHC 30.5 (L) 31.5 - 35.7 g/dL    RDW 12.6 11.7 - 15.4 %    Platelets 230 150 - 450 x10E3/uL    Neutrophils 69 Not Estab. %    Lymphs 16 Not Estab. %    Monocytes 9 Not Estab. %    Eos 4 Not Estab. %    Basos 1 Not Estab. %    Neutrophils (Absolute) 6.2 1.4 - 7.0 x10E3/uL    Lymphs (Absolute) 1.4 0.7 - 3.1 x10E3/uL    Monocytes(Absolute) 0.8 0.1 - 0.9 x10E3/uL    Eos (Absolute) 0.3 0.0 - 0.4 x10E3/uL    Baso (Absolute) 0.1 0.0 - 0.2 x10E3/uL    Immature Granulocytes 1 Not Estab. %   Comprehensive Metabolic Panel    Collection Time: 06/10/25  1:03 PM   Result Value Ref Range    Glucose 134 (H) 70 - 99 mg/dL    BUN 12 8 - 27 mg/dL    Creatinine 1.27 (H) 0.57 - 1.00 mg/dL    eGFR 47 (L) >59 mL/min/1.73    BUN/Creatinine Ratio 9 (L) 12 - 28    Sodium 140 134 - 144 mmol/L    Potassium 4.1 3.5 - 5.2 mmol/L    Chloride 106 96 - 106 mmol/L    CO2 18 (L) 20 - 29 mmol/L    Calcium 8.9 8.7 - 10.3 mg/dL    Protein, Total 7.3 6.0 - 8.5 g/dL    Albumin 4.0 3.9 - 4.9 g/dL    Globulin, Total 3.3 1.5 - 4.5 g/dL    Total Bilirubin 0.4 0.0 - 1.2 mg/dL    Alkaline Phosphatase 79 44 - 121 IU/L    AST 22 0 - 40 IU/L    ALT 22 0 - 32 IU/L   Protein/Creatinine Ratio, Urine    Collection Time: 06/10/25  1:03 PM   Result Value Ref Range    Creatinine Random urine 166.8 Not Estab. mg/dL    Protein,Total,Urine 16.7 Not Estab. mg/dL    Protein/Creat Ratio 100 0 - 200 mg/g creat   PTH, Intact    Collection Time: 06/10/25  1:03 PM   Result Value Ref Range    PTH, Intact 42 15 - 65 pg/mL   Specimen Status Report    Collection Time: 06/10/25  1:03 PM   Result Value Ref Range    Specimen Status Report Comment           Assessment & Plan:     Active Problem List with Overview Notes    Diagnosis Date Noted    Seizure 05/19/2025    Wellness examination 12/24/2024    Bloody stool  "12/24/2024    Lumbar spondylosis 08/05/2024    Hypophosphatemia 06/18/2024    Metabolic acidosis 06/18/2024    Chronic pain of right knee 05/28/2024    Stage 3a chronic kidney disease 12/27/2023    Peripheral polyneuropathy 09/20/2023    Episodic confusion 02/07/2023    Decreased hearing of left ear 12/07/2022    Acute pain of both knees 12/07/2022    Advanced care planning/counseling discussion 11/02/2022    Seasonal allergies 11/02/2022    Postmenopausal 11/02/2022    Low back pain 09/21/2022    Arthritis 09/21/2022    Arthralgia of hip 09/21/2022    Moderate persistent asthma without complication 08/25/2022    Overactive bladder 07/05/2022    Family history of diabetes mellitus 07/05/2022    Breast cancer screening by mammogram 07/05/2022    Anxiety 07/05/2022    Depressive disorder 07/05/2022    Focal epilepsy 07/05/2022     Has had seizures since the age of 13. Initial seizure was in the setting of acute illness with hydronephrosis and continued to have seizures frequently. Used to stare and throw dolls at her sister and did not know they were seizures.  SHe used to have 30-40 seizures a month. Currently seizure frequency is about 1 every 6 months. She is on vimpat 200 BID, topamax 200 BID. She also takes clorazepate, clonazepam 1 BID. She has 2 types of seizures. First type is characterized by starting with "lord sola help me" and she makes sign of cross, then stares, hits her leg, falls to ground if standing lasting for 1-2 minutes. has postictal confusion for 5 minutes to 15 minutes. Has never had a generalized tonic clonic seizure witnessed. Second type is nocturnal. She has had a seizure in her sleep and woke up with tongue bitten. Overall she is very happy with her seizure frequency currently as it is much improved. She does complain of short-term memory problems, which she attributes to the medications.        Fibroadenoma of breast 07/05/2022    Gastroesophageal reflux disease 07/05/2022    Glaucoma " 07/05/2022    Hypertension 07/05/2022    KHANH (obstructive sleep apnea) 07/05/2022    Class 3 severe obesity with serious comorbidity and body mass index (BMI) of 45.0 to 49.9 in adult 07/05/2022    COPD (chronic obstructive pulmonary disease) 07/05/2022    Neuropathy 07/05/2022    Colon cancer 01/18/2018    Cancer of splenic flexure 12/20/2017       1. Primary hypertension  Assessment & Plan:  Well controlled on current prescriptions. Not on asa. Continue to monitor bp    Orders:  -     CBC Auto Differential; Future; Expected date: 12/24/2025  -     Comprehensive Metabolic Panel; Future; Expected date: 12/24/2025  -     Lipid Panel; Future; Expected date: 12/24/2025  -     TSH; Future; Expected date: 12/24/2025  -     Hemoglobin A1C; Future; Expected date: 12/24/2025  -     Urinalysis; Future; Expected date: 12/24/2025  -     Vitamin D; Future; Expected date: 12/24/2025    2. Chronic obstructive pulmonary disease, unspecified COPD type  Assessment & Plan:  Stable on current inhalers.  Not on home O2.  Keep appts with pulm.     Orders:  -     CBC Auto Differential; Future; Expected date: 12/24/2025  -     Comprehensive Metabolic Panel; Future; Expected date: 12/24/2025  -     Lipid Panel; Future; Expected date: 12/24/2025  -     TSH; Future; Expected date: 12/24/2025  -     Hemoglobin A1C; Future; Expected date: 12/24/2025  -     Urinalysis; Future; Expected date: 12/24/2025  -     Vitamin D; Future; Expected date: 12/24/2025    3. Focal epilepsy  Overview:  Has had seizures since the age of 13. Initial seizure was in the setting of acute illness with hydronephrosis and continued to have seizures frequently. Used to stare and throw dolls at her sister and did not know they were seizures.  SHe used to have 30-40 seizures a month. Currently seizure frequency is about 1 every 6 months. She is on vimpat 200 BID, topamax 200 BID. She also takes clorazepate, clonazepam 1 BID. She has 2 types of seizures. First type is  "characterized by starting with "lord selby help me" and she makes sign of cross, then stares, hits her leg, falls to ground if standing lasting for 1-2 minutes. has postictal confusion for 5 minutes to 15 minutes. Has never had a generalized tonic clonic seizure witnessed. Second type is nocturnal. She has had a seizure in her sleep and woke up with tongue bitten. Overall she is very happy with her seizure frequency currently as it is much improved. She does complain of short-term memory problems, which she attributes to the medications.      Assessment & Plan:  Keep scheduled appts with neuro.  They are working on weaning off klonopin.  Er precautions discussed.     Orders:  -     CBC Auto Differential; Future; Expected date: 12/24/2025  -     Comprehensive Metabolic Panel; Future; Expected date: 12/24/2025  -     Lipid Panel; Future; Expected date: 12/24/2025  -     TSH; Future; Expected date: 12/24/2025  -     Hemoglobin A1C; Future; Expected date: 12/24/2025  -     Urinalysis; Future; Expected date: 12/24/2025  -     Vitamin D; Future; Expected date: 12/24/2025    4. Anxiety  Assessment & Plan:  Stable on effexor.     Orders:  -     Ambulatory referral/consult to Psychiatry; Future; Expected date: 07/01/2025  -     CBC Auto Differential; Future; Expected date: 12/24/2025  -     Comprehensive Metabolic Panel; Future; Expected date: 12/24/2025  -     Lipid Panel; Future; Expected date: 12/24/2025  -     TSH; Future; Expected date: 12/24/2025  -     Hemoglobin A1C; Future; Expected date: 12/24/2025  -     Urinalysis; Future; Expected date: 12/24/2025  -     Vitamin D; Future; Expected date: 12/24/2025    5. Breast cancer screening by mammogram  Assessment & Plan:  [facility] 1/2024.   ordered    Orders:  -     Cancel: Mammo Digital Screening Bilat w/ Tyler (XPD); Future; Expected date: 06/24/2025  -     Mammo Digital Screening Bilat w/ Tyler (XPD); Future; Expected date: 06/24/2025  -     CBC Auto Differential; Future; " Expected date: 12/24/2025  -     Comprehensive Metabolic Panel; Future; Expected date: 12/24/2025  -     Lipid Panel; Future; Expected date: 12/24/2025  -     TSH; Future; Expected date: 12/24/2025  -     Hemoglobin A1C; Future; Expected date: 12/24/2025  -     Urinalysis; Future; Expected date: 12/24/2025  -     Vitamin D; Future; Expected date: 12/24/2025    6. Stage 3a chronic kidney disease  Assessment & Plan:  Stable. Avoid nephrotoxic drugs. Encourage fluids.  Keep appts with renal.     Orders:  -     CBC Auto Differential; Future; Expected date: 12/24/2025  -     Comprehensive Metabolic Panel; Future; Expected date: 12/24/2025  -     Lipid Panel; Future; Expected date: 12/24/2025  -     TSH; Future; Expected date: 12/24/2025  -     Hemoglobin A1C; Future; Expected date: 12/24/2025  -     Urinalysis; Future; Expected date: 12/24/2025  -     Vitamin D; Future; Expected date: 12/24/2025    7. Class 3 severe obesity due to excess calories with serious comorbidity and body mass index (BMI) of 45.0 to 49.9 in adult  Assessment & Plan:  Encourage lifestyle change    Orders:  -     CBC Auto Differential; Future; Expected date: 12/24/2025  -     Comprehensive Metabolic Panel; Future; Expected date: 12/24/2025  -     Lipid Panel; Future; Expected date: 12/24/2025  -     TSH; Future; Expected date: 12/24/2025  -     Hemoglobin A1C; Future; Expected date: 12/24/2025  -     Urinalysis; Future; Expected date: 12/24/2025  -     Vitamin D; Future; Expected date: 12/24/2025    8. Cancer of splenic flexure  Assessment & Plan:  Keep appts with GI and oncology    Orders:  -     CBC Auto Differential; Future; Expected date: 12/24/2025  -     Comprehensive Metabolic Panel; Future; Expected date: 12/24/2025  -     Lipid Panel; Future; Expected date: 12/24/2025  -     TSH; Future; Expected date: 12/24/2025  -     Hemoglobin A1C; Future; Expected date: 12/24/2025  -     Urinalysis; Future; Expected date: 12/24/2025  -     Vitamin D;  Future; Expected date: 12/24/2025    9. Wellness examination  -     CBC Auto Differential; Future; Expected date: 12/24/2025  -     Comprehensive Metabolic Panel; Future; Expected date: 12/24/2025  -     Lipid Panel; Future; Expected date: 12/24/2025  -     TSH; Future; Expected date: 12/24/2025  -     Hemoglobin A1C; Future; Expected date: 12/24/2025  -     Urinalysis; Future; Expected date: 12/24/2025  -     Vitamin D; Future; Expected date: 12/24/2025    10. Abnormal finding of blood chemistry, unspecified  -     Hemoglobin A1C; Future; Expected date: 12/24/2025         Follow up in about 6 months (around 12/24/2025) for Medicare Wellness with labs.          [1]   Current Outpatient Medications on File Prior to Visit   Medication Sig Dispense Refill    albuterol (PROVENTIL/VENTOLIN HFA) 90 mcg/actuation inhaler Inhale into the lungs.      albuterol-ipratropium (DUO-NEB) 2.5 mg-0.5 mg/3 mL nebulizer solution PLEASE SEE ATTACHED FOR DETAILED DIRECTIONS      amLODIPine (NORVASC) 5 MG tablet Take 2 tablets (10 mg total) by mouth once daily. 180 tablet 3    clonazePAM (KLONOPIN) 0.25 MG TbDL Take nightly for 2 months, then every other night for 2 months, then every 3rd night for 2 months then stop 30 tablet 5    gabapentin (NEURONTIN) 300 MG capsule Take 1 capsule (300 mg total) by mouth every evening. 30 capsule 11    lacosamide (VIMPAT) 200 mg Tab tablet Take 1 tablet (200 mg total) by mouth every 12 (twelve) hours. 60 tablet 5    latanoprost 0.005 % ophthalmic solution Place 1 drop into both eyes nightly.      levocetirizine (XYZAL) 5 MG tablet Take 5 mg by mouth every evening.      lisinopriL (PRINIVIL,ZESTRIL) 40 MG tablet Take 1 tablet (40 mg total) by mouth once daily. 90 tablet 1    miscellaneous medical supply Kit 1 each by Misc.(Non-Drug; Combo Route) route Daily. ROUND SHOWER STOOL; NEEDED FOR 99 MONTHS 1 kit 0    nebulizer and compressor Eli 1 each by Misc.(Non-Drug; Combo Route) route every 6 (six) hours  as needed (wheezing). 1 each 0    SYMBICORT 160-4.5 mcg/actuation HFAA Inhale into the lungs.      topiramate (TOPAMAX) 100 MG tablet TAKE 1 1/2 TABLETS BY MOUTH TWICE DAILY 270 tablet 3    venlafaxine (EFFEXOR-XR) 37.5 MG 24 hr capsule Take 1 capsule (37.5 mg total) by mouth once daily. To take with 75mg once daily 90 capsule 3    venlafaxine (EFFEXOR-XR) 75 MG 24 hr capsule Take 1 capsule (75 mg total) by mouth once daily. 90 capsule 0    [DISCONTINUED] hydrOXYzine pamoate (VISTARIL) 25 MG Cap Take 2 capsules (50 mg total) by mouth 2 (two) times daily. 360 capsule 0    budesonide (PULMICORT) 0.5 mg/2 mL nebulizer solution Take 0.5 mg by nebulization daily as needed.       No current facility-administered medications on file prior to visit.

## 2025-06-30 RX ORDER — HYDROXYZINE PAMOATE 25 MG/1
50 CAPSULE ORAL 2 TIMES DAILY
Qty: 360 CAPSULE | Refills: 3 | Status: SHIPPED | OUTPATIENT
Start: 2025-06-30

## 2025-06-30 NOTE — ASSESSMENT & PLAN NOTE
Keep scheduled appts with neuro.  They are working on weaning off klonopin.  Er precautions discussed.

## 2025-07-10 DIAGNOSIS — I10 PRIMARY HYPERTENSION: ICD-10-CM

## 2025-07-10 DIAGNOSIS — N18.31 STAGE 3A CHRONIC KIDNEY DISEASE: Primary | ICD-10-CM

## 2025-07-14 ENCOUNTER — PATIENT MESSAGE (OUTPATIENT)
Dept: NEPHROLOGY | Facility: CLINIC | Age: 65
End: 2025-07-14
Payer: MEDICARE

## 2025-07-16 ENCOUNTER — TELEPHONE (OUTPATIENT)
Dept: FAMILY MEDICINE | Facility: CLINIC | Age: 65
End: 2025-07-16
Payer: MEDICARE

## 2025-07-16 NOTE — TELEPHONE ENCOUNTER
Copied from CRM #[id number]. Topic: General Inquiry - Patient Advice  >> Jul 16, 2025  2:53 PM Byron wrote:  Who Called: Christie Marcum    Caller is requesting assistance/information from provider's office.    Symptoms (please be specific):    How long has patient had these symptoms:    List of preferred pharmacies on file (remove unneeded): [unfilled]  If different, enter pharmacy into here including location and phone number:       Preferred Method of Contact: Phone Call  Patient's Preferred Phone Number on File: [phone]   Best Call Back Number, if different:  Additional Information: pt called to speak to nurse about Vitamin E, gastro provider is ordering.  Vitamin E was prev discontinued by Dr Leon, would like provider to confirm its ok to start vitamin again, please follow up

## 2025-07-17 LAB
ALBUMIN SERPL-MCNC: 4.1 G/DL (ref 3.9–4.9)
ALP SERPL-CCNC: 83 IU/L (ref 44–121)
ALT SERPL-CCNC: 53 IU/L (ref 0–32)
AST SERPL-CCNC: 34 IU/L (ref 0–40)
BASOPHILS # BLD AUTO: 0 X10E3/UL (ref 0–0.2)
BASOPHILS NFR BLD AUTO: 0 %
BILIRUB SERPL-MCNC: 0.4 MG/DL (ref 0–1.2)
BUN SERPL-MCNC: 26 MG/DL (ref 8–27)
BUN/CREAT SERPL: 18 (ref 12–28)
CALCIUM SERPL-MCNC: 9.8 MG/DL (ref 8.7–10.3)
CHLORIDE SERPL-SCNC: 103 MMOL/L (ref 96–106)
CO2 SERPL-SCNC: 17 MMOL/L (ref 20–29)
CREAT SERPL-MCNC: 1.46 MG/DL (ref 0.57–1)
CREAT UR-MCNC: 213.1 MG/DL
EOSINOPHIL # BLD AUTO: 0.3 X10E3/UL (ref 0–0.4)
EOSINOPHIL NFR BLD AUTO: 5 %
ERYTHROCYTE [DISTWIDTH] IN BLOOD BY AUTOMATED COUNT: 12.3 % (ref 11.7–15.4)
GFR SERPLBLD CREATININE-BSD FMLA CKD-EPI: 40 ML/MIN/1.73
GLOBULIN SER CALC-MCNC: 3.6 G/DL (ref 1.5–4.5)
GLUCOSE SERPL-MCNC: 145 MG/DL (ref 70–99)
HCT VFR BLD AUTO: 43.3 % (ref 34–46.6)
HGB BLD-MCNC: 13.3 G/DL (ref 11.1–15.9)
IMM GRANULOCYTES NFR BLD AUTO: 0 %
LYMPHOCYTES # BLD AUTO: 1.1 X10E3/UL (ref 0.7–3.1)
LYMPHOCYTES NFR BLD AUTO: 16 %
MCH RBC QN AUTO: 27.4 PG (ref 26.6–33)
MCHC RBC AUTO-ENTMCNC: 30.7 G/DL (ref 31.5–35.7)
MCV RBC AUTO: 89 FL (ref 79–97)
MONOCYTES # BLD AUTO: 0.6 X10E3/UL (ref 0.1–0.9)
MONOCYTES NFR BLD AUTO: 9 %
NEUTROPHILS # BLD AUTO: 4.8 X10E3/UL (ref 1.4–7)
NEUTROPHILS NFR BLD AUTO: 70 %
PLATELET # BLD AUTO: 281 X10E3/UL (ref 150–450)
POTASSIUM SERPL-SCNC: 4.4 MMOL/L (ref 3.5–5.2)
PROT SERPL-MCNC: 7.7 G/DL (ref 6–8.5)
PROT UR-MCNC: 17.2 MG/DL
PROT/CREAT UR: 81 MG/G CREAT (ref 0–200)
PTH-INTACT SERPL-MCNC: 28 PG/ML (ref 15–65)
RBC # BLD AUTO: 4.86 X10E6/UL (ref 3.77–5.28)
SODIUM SERPL-SCNC: 137 MMOL/L (ref 134–144)
SPECIMEN STATUS REPORT: NORMAL
WBC # BLD AUTO: 6.9 X10E3/UL (ref 3.4–10.8)

## 2025-07-18 DIAGNOSIS — G40.209 PARTIAL SYMPTOMATIC EPILEPSY WITH COMPLEX PARTIAL SEIZURES, NOT INTRACTABLE, WITHOUT STATUS EPILEPTICUS: ICD-10-CM

## 2025-07-18 RX ORDER — CLONAZEPAM 1 MG/1
TABLET ORAL
Qty: 15 TABLET | OUTPATIENT
Start: 2025-07-18

## 2025-07-21 ENCOUNTER — OFFICE VISIT (OUTPATIENT)
Dept: NEPHROLOGY | Facility: CLINIC | Age: 65
End: 2025-07-21
Payer: MEDICARE

## 2025-07-21 VITALS
HEIGHT: 65 IN | BODY MASS INDEX: 45.45 KG/M2 | WEIGHT: 272.81 LBS | TEMPERATURE: 98 F | DIASTOLIC BLOOD PRESSURE: 80 MMHG | HEART RATE: 95 BPM | RESPIRATION RATE: 20 BRPM | OXYGEN SATURATION: 95 % | SYSTOLIC BLOOD PRESSURE: 139 MMHG

## 2025-07-21 DIAGNOSIS — I10 PRIMARY HYPERTENSION: ICD-10-CM

## 2025-07-21 DIAGNOSIS — E66.813 CLASS 3 SEVERE OBESITY DUE TO EXCESS CALORIES WITH SERIOUS COMORBIDITY AND BODY MASS INDEX (BMI) OF 45.0 TO 49.9 IN ADULT: ICD-10-CM

## 2025-07-21 DIAGNOSIS — N18.31 STAGE 3A CHRONIC KIDNEY DISEASE: Primary | ICD-10-CM

## 2025-07-21 DIAGNOSIS — N18.32 STAGE 3B CHRONIC KIDNEY DISEASE: ICD-10-CM

## 2025-07-21 PROCEDURE — 3079F DIAST BP 80-89 MM HG: CPT | Mod: CPTII,S$GLB,, | Performed by: INTERNAL MEDICINE

## 2025-07-21 PROCEDURE — 1159F MED LIST DOCD IN RCRD: CPT | Mod: CPTII,S$GLB,, | Performed by: INTERNAL MEDICINE

## 2025-07-21 PROCEDURE — 3066F NEPHROPATHY DOC TX: CPT | Mod: CPTII,S$GLB,, | Performed by: INTERNAL MEDICINE

## 2025-07-21 PROCEDURE — 3008F BODY MASS INDEX DOCD: CPT | Mod: CPTII,S$GLB,, | Performed by: INTERNAL MEDICINE

## 2025-07-21 PROCEDURE — 4010F ACE/ARB THERAPY RXD/TAKEN: CPT | Mod: CPTII,S$GLB,, | Performed by: INTERNAL MEDICINE

## 2025-07-21 PROCEDURE — 99213 OFFICE O/P EST LOW 20 MIN: CPT | Mod: S$GLB,,, | Performed by: INTERNAL MEDICINE

## 2025-07-21 PROCEDURE — 99999 PR PBB SHADOW E&M-EST. PATIENT-LVL IV: CPT | Mod: PBBFAC,,, | Performed by: INTERNAL MEDICINE

## 2025-07-21 PROCEDURE — 3075F SYST BP GE 130 - 139MM HG: CPT | Mod: CPTII,S$GLB,, | Performed by: INTERNAL MEDICINE

## 2025-07-21 PROCEDURE — 3044F HG A1C LEVEL LT 7.0%: CPT | Mod: CPTII,S$GLB,, | Performed by: INTERNAL MEDICINE

## 2025-07-21 RX ORDER — KETOROLAC TROMETHAMINE 5 MG/ML
1 SOLUTION OPHTHALMIC 4 TIMES DAILY
COMMUNITY
Start: 2025-06-24

## 2025-07-21 RX ORDER — MOXIFLOXACIN 5 MG/ML
1 SOLUTION/ DROPS OPHTHALMIC 4 TIMES DAILY
COMMUNITY
Start: 2025-06-24

## 2025-07-21 RX ORDER — PREDNISOLONE ACETATE 10 MG/ML
1 SUSPENSION/ DROPS OPHTHALMIC 4 TIMES DAILY
COMMUNITY
Start: 2025-06-24

## 2025-07-21 NOTE — PROGRESS NOTES
[facility] Ambulatory Progress Note      HPI  Christie Marcum, 64 y.o. female, presents to office for a follow up visit for CKD 3 related to nephrosclerosis and hyper filtration  No new complaints    Patient denies taking NSAIDs or new antibiotics, recent episode of dehydration, diarrhea, vomiting, acute illness, hospitalization, recent angiograms or exposure to IV radiocontrast.        Medical Diagnoses:   Past Medical History:   Diagnosis Date    Anxiety     Arthritis     Asthma     Cancer of splenic flexure     CKD (chronic kidney disease)     COPD (chronic obstructive pulmonary disease) 07/05/2022    Decreased hearing of left ear 12/07/2022    Depressive disorder 07/05/2022    Epilepsy 07/05/2022    Has had seizures since the age of 13. Initial seizure was in the setting of acute illness with hydronephrosis and continued to have seizures frequently. Used to stare and throw dolls at her sister and did not know they were seizures.  SHe used to have 30-40 seizures a month. Currently seizure frequency is about 1 every 6 months. She is on vimpat 200 BID, topamax 200 BID. She also takes clorazepate    Gastroesophageal reflux disease 07/05/2022    Glaucoma 07/05/2022    Hypertension     IGT (impaired glucose tolerance)     Low back pain 09/21/2022    Moderate persistent asthma without complication 08/25/2022    Neuropathy 07/05/2022    On home oxygen therapy 09/21/2022    KHANH on CPAP     Overactive bladder 07/05/2022    Seasonal allergies 11/02/2022     Problem List[1]    Surgical History:   Past Surgical History:   Procedure Laterality Date    COLON SURGERY      COLONOSCOPY      COMPLEX CYSTOMETROGRAM      EMG      HYSTERECTOMY      Left breas biopsy      Right breast biopsy         Family History:   Family History   Problem Relation Name Age of Onset    Hypertension Mother      Stroke Mother      Hypertension Father      Stroke Father      Hyperlipidemia Sister      Kidney disease Sister         Social History:    Social History     Tobacco Use    Smoking status: Never     Passive exposure: Never    Smokeless tobacco: Never   Substance Use Topics    Alcohol use: Not Currently       Allergies:  Review of patient's allergies indicates:   Allergen Reactions    Cephalexin      Other reaction(s): Seizure  hallucinations    Other reaction(s): Unknown    Ciprofloxacin      Other reaction(s): Seiurus  Other reaction(s): Unknown    Codeine      Other reaction(s): Seizure  seizure      Metronidazole      Other reaction(s): Seizure    Metronidazole hcl      Other reaction(s): Unknown    Naproxen      Other reaction(s): Seizure  Nausea/seizure      Pantoprazole sodium      Other reaction(s): Unknown    Penicillin Other (See Comments)    Tetracycline      Other reaction(s): Seizure  seizure      Tramadol Other (See Comments)     Other reaction(s): Seizure  SEIZURES HALLUCINATION         Medications:  Outpatient Medications Marked as Taking for the 7/21/25 encounter (Office Visit) with Jd Carolina MD   Medication Sig Dispense Refill    albuterol (PROVENTIL/VENTOLIN HFA) 90 mcg/actuation inhaler Inhale into the lungs.      albuterol-ipratropium (DUO-NEB) 2.5 mg-0.5 mg/3 mL nebulizer solution PLEASE SEE ATTACHED FOR DETAILED DIRECTIONS      amLODIPine (NORVASC) 5 MG tablet Take 2 tablets (10 mg total) by mouth once daily. 180 tablet 3    budesonide (PULMICORT) 0.5 mg/2 mL nebulizer solution Take 0.5 mg by nebulization daily as needed.      clonazePAM (KLONOPIN) 0.25 MG TbDL Take nightly for 2 months, then every other night for 2 months, then every 3rd night for 2 months then stop 30 tablet 5    gabapentin (NEURONTIN) 300 MG capsule Take 1 capsule (300 mg total) by mouth every evening. 30 capsule 11    hydrOXYzine pamoate (VISTARIL) 25 MG Cap TAKE 2 CAPSULES BY MOUTH TWICE A  capsule 3    ketorolac 0.5% (ACULAR) 0.5 % Drop Place 1 drop into the left eye 4 (four) times daily.      lacosamide (VIMPAT) 200 mg Tab tablet Take 1  tablet (200 mg total) by mouth every 12 (twelve) hours. 60 tablet 5    latanoprost 0.005 % ophthalmic solution Place 1 drop into both eyes nightly.      levocetirizine (XYZAL) 5 MG tablet Take 5 mg by mouth every evening.      lisinopriL (PRINIVIL,ZESTRIL) 40 MG tablet Take 1 tablet (40 mg total) by mouth once daily. 90 tablet 1    miscellaneous medical supply Kit 1 each by Misc.(Non-Drug; Combo Route) route Daily. ROUND SHOWER STOOL; NEEDED FOR 99 MONTHS 1 kit 0    moxifloxacin (VIGAMOX) 0.5 % ophthalmic solution Place 1 drop into the left eye 4 (four) times daily.      nebulizer and compressor Eli 1 each by Misc.(Non-Drug; Combo Route) route every 6 (six) hours as needed (wheezing). 1 each 0    prednisoLONE acetate (PRED FORTE) 1 % DrpS Place 1 drop into both eyes 4 (four) times daily.      SYMBICORT 160-4.5 mcg/actuation HFAA Inhale into the lungs.      topiramate (TOPAMAX) 100 MG tablet TAKE 1 1/2 TABLETS BY MOUTH TWICE DAILY 270 tablet 3    venlafaxine (EFFEXOR-XR) 37.5 MG 24 hr capsule Take 1 capsule (37.5 mg total) by mouth once daily. To take with 75mg once daily 90 capsule 3    venlafaxine (EFFEXOR-XR) 75 MG 24 hr capsule Take 1 capsule (75 mg total) by mouth once daily. 90 capsule 0          Review of Systems:    Constitutional: Denies fever, fatigue, generalized weakness  Skin: Denies wounds, no rashes, no itching, no new skin lesions  Respiratory:  Denies cough, shortness of breath, or wheezing  Cardiovascular: Denies chest pain, palpitations, or swelling  Gastrointestional: Denies abdominal pain, nausea, vomiting, diarrhea, or constipation  Genitourinary: Denies dysuria, hematuria, foamy urine, or incontinence; reports able to empty bladder  Musculoskeletal: Denies back or flank pain  Neurological: Denies headaches, dizziness, paresthesias, tremors or focal weakness      Vital Signs:  /80 (BP Location: Left arm, Patient Position: Sitting)   Pulse 95   Temp 98.4 °F (36.9 °C) (Oral)   Resp 20    "Ht 5' 5" (1.651 m)   Wt 123.7 kg (272 lb 12.8 oz)   LMP  (LMP Unknown)   SpO2 95%   BMI 45.40 kg/m²   Body mass index is 45.4 kg/m².      Physical Exam:    General: no acute distress, awake, alert  Eyes: conjunctiva clear, eyelids without swelling  HENT: atraumatic, oropharynx and nasal mucosa patent  Neck: supple, trache midline, no JVD  Respiratory: equal, unlabored, clear to auscultation A/P  Cardiovascular: RRR without rub  Edema: none  Gastrointestinal: soft, non-tender, non-distended; positive bowel sounds; no masses to palpation; no ascites  Genitourinary: no CVA tenderness upon palpation  Musculoskeletal: ROM without new limitation or discomfort  Integumentary: warm, dry; no rashes, wounds, or skin lesions  Neurological: oriented x4, appropriate, no acute deficits; no asterixis      Labs:    Blood:  Lab Results   Component Value Date    WBC 6.9 07/16/2025    HGB 13.3 07/16/2025    HCT 43.3 07/16/2025     07/16/2025    AWTGJVMA52 526 02/24/2023     07/16/2025    K 4.4 07/16/2025    CO2 17 (L) 07/16/2025    BUN 26 07/16/2025    CREATININE 1.46 (H) 07/16/2025    EGFRNORACEVR 40 (L) 07/16/2025    CALCIUM 9.8 07/16/2025    ALKPHOS 67 05/14/2025    LABPROT 81 07/16/2025    ALBUMIN 4.1 07/16/2025    BILIDIR 0.3 03/24/2022    IBILI 0.30 03/24/2022    AST 34 07/16/2025    ALT 53 (H) 07/16/2025    MG 1.90 07/02/2024    PHOS 2.6 12/16/2024      Lab Results   Component Value Date    HGBA1C 5.9 01/03/2025    PTH 28 07/16/2025    ULTIWBIR58ZL 27.98 (L) 11/16/2017    HIV Nonreactive 03/12/2024    HEPCAB Negative 05/10/2017     Urine:  Lab Results   Component Value Date    APPEARANCEUA Clear 06/12/2024    SGUA 1.020 06/12/2024    PROTEINUA Negative 06/12/2024    KETONESUA Negative 06/12/2024    LEUKOCYTESUR Negative 06/12/2024    RBCUA None seen 12/15/2023    WBCUA None seen 12/15/2023    BACTERIA Few 12/15/2023    CREATRANDUR 213.1 07/16/2025    PROTEINURINE 11.2 12/16/2024    UPROTCREA 0.1 12/16/2024    "         Imaging:  Retroperitoneal Ultrasound:      Impression:    1. Stage 3a chronic kidney disease        2. Primary hypertension        3. Class 3 severe obesity due to excess calories with serious comorbidity and body mass index (BMI) of 45.0 to 49.9 in adult        4. Stage 3b chronic kidney disease        Renal function mildly worsening but within her range  Patient denies NSAIDs use or abuse      Plan:    Continue hydration avoiding NSAIDs  Labs and renal ultrasound in 3 months      Jd Carolina      This note was created with the assistance of [employer] voice recognition software or phone dictation. There may be transcription errors as a result of using this technology however minimal. Effort has been made to assure accuracy of transcription but any obvious errors or omissions should be clarified with the author of the document.          [1]   Patient Active Problem List  Diagnosis    Overactive bladder    Family history of diabetes mellitus    Breast cancer screening by mammogram    Anxiety    Cancer of splenic flexure    Colon cancer    Depressive disorder    Focal epilepsy    Fibroadenoma of breast    Gastroesophageal reflux disease    Glaucoma    Hypertension    KHANH (obstructive sleep apnea)    Class 3 severe obesity with serious comorbidity and body mass index (BMI) of 45.0 to 49.9 in adult    COPD (chronic obstructive pulmonary disease)    Neuropathy    Moderate persistent asthma without complication    Low back pain    Arthritis    Arthralgia of hip    Advanced care planning/counseling discussion    Seasonal allergies    Postmenopausal    Decreased hearing of left ear    Acute pain of both knees    Episodic confusion    Peripheral polyneuropathy    Stage 3a chronic kidney disease    Chronic pain of right knee    Hypophosphatemia    Metabolic acidosis    Lumbar spondylosis    Wellness examination    Bloody stool    Seizure    Stage 3b chronic kidney disease

## 2025-07-23 RX ORDER — VENLAFAXINE HYDROCHLORIDE 75 MG/1
75 CAPSULE, EXTENDED RELEASE ORAL DAILY
Qty: 90 CAPSULE | Refills: 0 | Status: SHIPPED | OUTPATIENT
Start: 2025-07-23

## 2025-07-23 NOTE — TELEPHONE ENCOUNTER
Copied from CRM #[id number]. Topic: Medications - Medication Refill  >> Jul 23, 2025  2:24 PM Mitali wrote:  .Who Called: Christie Marcum    Refill or New Rx:Refill  RX Name and Strength:venlafaxine (EFFEXOR-XR) 75 MG 24 hr capsule  How is the patient currently taking it? (ex. 1XDay):1xday  Is this a 30 day or 90 day RX:90  Local or Mail Order:Local  List of preferred pharmacies on file (remove unneeded): [id number] - [place], [place] - [employer] [id number]      If different Pharmacy is requested, enter Pharmacy information here including location and phone number: na   Ordering Provider:PCP      Preferred Method of Contact: Phone Call  Patient's Preferred Phone Number on File: [phone]   Best Call Back Number, if different:  Additional Information:

## 2025-07-29 ENCOUNTER — TELEPHONE (OUTPATIENT)
Dept: NEUROLOGY | Facility: CLINIC | Age: 65
End: 2025-07-29
Payer: MEDICARE

## 2025-07-29 DIAGNOSIS — R51.9 OCCIPITAL HEADACHE: ICD-10-CM

## 2025-07-29 DIAGNOSIS — M54.2 NECK PAIN: Primary | ICD-10-CM

## 2025-07-29 NOTE — TELEPHONE ENCOUNTER
Sounds like something called occipital neuralgia.  She takes gbp I believe.  What dose?  We could increase the dosage on that medication which would likely help.  Additionally, PT would probably be beneficial for those symptoms.  Would she want a referral?

## 2025-07-29 NOTE — TELEPHONE ENCOUNTER
2 weeks ago started having daily headaches to the back of her neck radiating to the back of her head. States the back of her head is very tender to touch. When she turns her neck or bend her neck down it is very painful. Also, states she has nausea with the headaches. Please advise [phone]

## 2025-07-30 DIAGNOSIS — M54.9 DORSALGIA, UNSPECIFIED: ICD-10-CM

## 2025-07-30 RX ORDER — GABAPENTIN 300 MG/1
300 CAPSULE ORAL 2 TIMES DAILY
Qty: 60 CAPSULE | Refills: 5 | Status: SHIPPED | OUTPATIENT
Start: 2025-07-30 | End: 2026-07-30

## 2025-07-30 NOTE — TELEPHONE ENCOUNTER
I will enter referral    Increase gbp to 300 mg BID.  If that is not helpful over next 2-3 weeks, we can increase further

## 2025-07-30 NOTE — TELEPHONE ENCOUNTER
Patient informed to increase gabapentin to 300 mg bid. Also, orders for PT to be done at [facility] were put in.